# Patient Record
Sex: FEMALE | Race: WHITE | Employment: OTHER | URBAN - METROPOLITAN AREA
[De-identification: names, ages, dates, MRNs, and addresses within clinical notes are randomized per-mention and may not be internally consistent; named-entity substitution may affect disease eponyms.]

---

## 2017-05-24 ENCOUNTER — TELEPHONE (OUTPATIENT)
Dept: ORTHOPEDIC SURGERY | Age: 82
End: 2017-05-24

## 2017-05-24 PROBLEM — F03.90 DEMENTIA (HCC): Status: ACTIVE | Noted: 2017-05-24

## 2017-05-24 PROBLEM — K21.9 GERD (GASTROESOPHAGEAL REFLUX DISEASE): Status: ACTIVE | Noted: 2017-05-24

## 2017-05-25 PROBLEM — I48.20 CHRONIC ATRIAL FIBRILLATION (HCC): Status: ACTIVE | Noted: 2017-05-25

## 2017-05-25 PROBLEM — Z01.818 PRE-OP EXAM: Status: ACTIVE | Noted: 2017-05-25

## 2017-05-25 PROBLEM — I25.5 CARDIOMYOPATHY, ISCHEMIC: Status: ACTIVE | Noted: 2017-05-25

## 2017-06-06 ENCOUNTER — TELEPHONE (OUTPATIENT)
Dept: ORTHOPEDIC SURGERY | Age: 82
End: 2017-06-06

## 2017-06-29 ENCOUNTER — OFFICE VISIT (OUTPATIENT)
Dept: ORTHOPEDIC SURGERY | Age: 82
End: 2017-06-29

## 2017-06-29 VITALS — BODY MASS INDEX: 21.9 KG/M2 | WEIGHT: 119 LBS | HEIGHT: 62 IN

## 2017-06-29 DIAGNOSIS — M25.551 HIP PAIN, RIGHT: ICD-10-CM

## 2017-06-29 DIAGNOSIS — S72.001D CLOSED FRACTURE OF NECK OF RIGHT FEMUR WITH ROUTINE HEALING, SUBSEQUENT ENCOUNTER: Primary | ICD-10-CM

## 2017-06-29 PROCEDURE — 73502 X-RAY EXAM HIP UNI 2-3 VIEWS: CPT | Performed by: ORTHOPAEDIC SURGERY

## 2017-06-29 PROCEDURE — 99024 POSTOP FOLLOW-UP VISIT: CPT | Performed by: ORTHOPAEDIC SURGERY

## 2017-06-29 RX ORDER — HYDROCODONE BITARTRATE AND ACETAMINOPHEN 10; 300 MG/1; MG/1
TABLET ORAL
Status: ON HOLD | COMMUNITY
Start: 2017-06-19 | End: 2020-10-08

## 2017-06-29 RX ORDER — DONEPEZIL HYDROCHLORIDE 10 MG/1
TABLET, FILM COATED ORAL
Status: ON HOLD | COMMUNITY
Start: 2017-06-27 | End: 2021-12-12

## 2017-06-29 RX ORDER — TEMAZEPAM 15 MG/1
CAPSULE ORAL
Status: ON HOLD | COMMUNITY
Start: 2017-05-17 | End: 2020-10-08

## 2017-06-29 RX ORDER — WARFARIN SODIUM 2.5 MG/1
2.5 TABLET ORAL DAILY
COMMUNITY
Start: 2017-04-25

## 2017-06-29 RX ORDER — LEVOFLOXACIN 250 MG/1
TABLET ORAL
Status: ON HOLD | COMMUNITY
Start: 2017-04-14 | End: 2020-10-08

## 2017-06-29 RX ORDER — DOXYCYCLINE HYCLATE 100 MG
TABLET ORAL
Status: ON HOLD | COMMUNITY
Start: 2017-03-30 | End: 2020-10-08

## 2017-06-29 RX ORDER — SERTRALINE HYDROCHLORIDE 25 MG/1
TABLET, FILM COATED ORAL
Status: ON HOLD | COMMUNITY
Start: 2017-05-17 | End: 2020-10-08

## 2017-06-29 RX ORDER — PERPHENAZINE/AMITRIPTYLINE HCL 2 MG-25 MG
1 TABLET ORAL NIGHTLY
Status: ON HOLD | COMMUNITY
Start: 2017-04-13 | End: 2021-12-12

## 2017-06-29 RX ORDER — LISINOPRIL 10 MG/1
10 TABLET ORAL DAILY
COMMUNITY
Start: 2017-06-26

## 2017-06-29 RX ORDER — SUVOREXANT 10 MG/1
TABLET, FILM COATED ORAL
Status: ON HOLD | COMMUNITY
Start: 2017-06-21 | End: 2020-10-08

## 2017-06-29 RX ORDER — ATORVASTATIN CALCIUM 20 MG/1
20 TABLET, FILM COATED ORAL DAILY
Status: ON HOLD | COMMUNITY
Start: 2017-05-18 | End: 2021-12-12

## 2017-08-29 ENCOUNTER — OFFICE VISIT (OUTPATIENT)
Dept: ORTHOPEDIC SURGERY | Age: 82
End: 2017-08-29

## 2017-08-29 VITALS
BODY MASS INDEX: 20.24 KG/M2 | HEART RATE: 65 BPM | SYSTOLIC BLOOD PRESSURE: 138 MMHG | WEIGHT: 110 LBS | HEIGHT: 62 IN | DIASTOLIC BLOOD PRESSURE: 86 MMHG

## 2017-08-29 DIAGNOSIS — S72.001D CLOSED FRACTURE OF NECK OF RIGHT FEMUR WITH ROUTINE HEALING, SUBSEQUENT ENCOUNTER: Primary | ICD-10-CM

## 2017-08-29 DIAGNOSIS — S72.001A HIP FRACTURE, RIGHT, CLOSED, INITIAL ENCOUNTER (HCC): ICD-10-CM

## 2017-08-29 PROCEDURE — 99213 OFFICE O/P EST LOW 20 MIN: CPT | Performed by: ORTHOPAEDIC SURGERY

## 2017-08-29 PROCEDURE — 73502 X-RAY EXAM HIP UNI 2-3 VIEWS: CPT | Performed by: ORTHOPAEDIC SURGERY

## 2017-09-09 ENCOUNTER — HOSPITAL ENCOUNTER (OUTPATIENT)
Dept: VASCULAR LAB | Age: 82
Discharge: HOME | End: 2017-09-09
Attending: NURSE PRACTITIONER | Admitting: NURSE PRACTITIONER

## 2017-09-09 DIAGNOSIS — M79.605 PAIN OF LEFT LEG: ICD-10-CM

## 2018-09-26 PROBLEM — Z01.818 PRE-OP EXAM: Status: RESOLVED | Noted: 2017-05-25 | Resolved: 2018-09-26

## 2020-10-07 ENCOUNTER — APPOINTMENT (OUTPATIENT)
Dept: GENERAL RADIOLOGY | Age: 85
End: 2020-10-07
Payer: MEDICARE

## 2020-10-07 ENCOUNTER — HOSPITAL ENCOUNTER (OUTPATIENT)
Age: 85
Setting detail: OBSERVATION
Discharge: NURSING, REHAB OR LONG-TERM CARE | End: 2020-10-09
Attending: EMERGENCY MEDICINE | Admitting: STUDENT IN AN ORGANIZED HEALTH CARE EDUCATION/TRAINING PROGRAM
Payer: MEDICARE

## 2020-10-07 LAB
A/G RATIO: 1.5 (ref 1.1–2.2)
ALBUMIN SERPL-MCNC: 4.1 G/DL (ref 3.4–5)
ALP BLD-CCNC: 83 U/L (ref 40–129)
ALT SERPL-CCNC: 16 U/L (ref 10–40)
ANION GAP SERPL CALCULATED.3IONS-SCNC: 12 MMOL/L (ref 3–16)
AST SERPL-CCNC: 28 U/L (ref 15–37)
BASOPHILS ABSOLUTE: 0 K/UL (ref 0–0.2)
BASOPHILS RELATIVE PERCENT: 0.6 %
BILIRUB SERPL-MCNC: 0.6 MG/DL (ref 0–1)
BILIRUBIN URINE: NEGATIVE
BLOOD, URINE: NEGATIVE
BUN BLDV-MCNC: 19 MG/DL (ref 7–20)
CALCIUM SERPL-MCNC: 10.3 MG/DL (ref 8.3–10.6)
CHLORIDE BLD-SCNC: 100 MMOL/L (ref 99–110)
CLARITY: ABNORMAL
CO2: 25 MMOL/L (ref 21–32)
COLOR: YELLOW
CREAT SERPL-MCNC: 0.8 MG/DL (ref 0.6–1.2)
EOSINOPHILS ABSOLUTE: 0.1 K/UL (ref 0–0.6)
EOSINOPHILS RELATIVE PERCENT: 0.7 %
EPITHELIAL CELLS, UA: 1 /HPF (ref 0–5)
GFR AFRICAN AMERICAN: >60
GFR NON-AFRICAN AMERICAN: >60
GLOBULIN: 2.7 G/DL
GLUCOSE BLD-MCNC: 98 MG/DL (ref 70–99)
GLUCOSE URINE: NEGATIVE MG/DL
HCG QUALITATIVE: NEGATIVE
HCT VFR BLD CALC: 47.2 % (ref 36–48)
HEMOGLOBIN: 15.8 G/DL (ref 12–16)
HYALINE CASTS: 0 /LPF (ref 0–8)
KETONES, URINE: NEGATIVE MG/DL
LACTIC ACID: 1.6 MMOL/L (ref 0.4–2)
LEUKOCYTE ESTERASE, URINE: ABNORMAL
LYMPHOCYTES ABSOLUTE: 1.6 K/UL (ref 1–5.1)
LYMPHOCYTES RELATIVE PERCENT: 21.7 %
MCH RBC QN AUTO: 33.9 PG (ref 26–34)
MCHC RBC AUTO-ENTMCNC: 33.5 G/DL (ref 31–36)
MCV RBC AUTO: 101.1 FL (ref 80–100)
MICROSCOPIC EXAMINATION: YES
MONOCYTES ABSOLUTE: 0.5 K/UL (ref 0–1.3)
MONOCYTES RELATIVE PERCENT: 6.6 %
NEUTROPHILS ABSOLUTE: 5.3 K/UL (ref 1.7–7.7)
NEUTROPHILS RELATIVE PERCENT: 70.4 %
NITRITE, URINE: NEGATIVE
PDW BLD-RTO: 13.8 % (ref 12.4–15.4)
PH UA: 7.5 (ref 5–8)
PLATELET # BLD: 173 K/UL (ref 135–450)
PMV BLD AUTO: 8.3 FL (ref 5–10.5)
POTASSIUM REFLEX MAGNESIUM: 4.6 MMOL/L (ref 3.5–5.1)
PROTEIN UA: NEGATIVE MG/DL
RBC # BLD: 4.67 M/UL (ref 4–5.2)
RBC UA: 1 /HPF (ref 0–4)
SODIUM BLD-SCNC: 137 MMOL/L (ref 136–145)
SPECIFIC GRAVITY UA: 1.01 (ref 1–1.03)
TOTAL PROTEIN: 6.8 G/DL (ref 6.4–8.2)
TROPONIN: <0.01 NG/ML
URINE REFLEX TO CULTURE: ABNORMAL
URINE TYPE: ABNORMAL
UROBILINOGEN, URINE: 0.2 E.U./DL
WBC # BLD: 7.6 K/UL (ref 4–11)
WBC UA: 8 /HPF (ref 0–5)

## 2020-10-07 PROCEDURE — 2580000003 HC RX 258: Performed by: EMERGENCY MEDICINE

## 2020-10-07 PROCEDURE — 73560 X-RAY EXAM OF KNEE 1 OR 2: CPT

## 2020-10-07 PROCEDURE — 84703 CHORIONIC GONADOTROPIN ASSAY: CPT

## 2020-10-07 PROCEDURE — 83605 ASSAY OF LACTIC ACID: CPT

## 2020-10-07 PROCEDURE — 93005 ELECTROCARDIOGRAM TRACING: CPT | Performed by: EMERGENCY MEDICINE

## 2020-10-07 PROCEDURE — 80053 COMPREHEN METABOLIC PANEL: CPT

## 2020-10-07 PROCEDURE — 90715 TDAP VACCINE 7 YRS/> IM: CPT | Performed by: EMERGENCY MEDICINE

## 2020-10-07 PROCEDURE — 6370000000 HC RX 637 (ALT 250 FOR IP): Performed by: EMERGENCY MEDICINE

## 2020-10-07 PROCEDURE — 99285 EMERGENCY DEPT VISIT HI MDM: CPT

## 2020-10-07 PROCEDURE — 81001 URINALYSIS AUTO W/SCOPE: CPT

## 2020-10-07 PROCEDURE — 84484 ASSAY OF TROPONIN QUANT: CPT

## 2020-10-07 PROCEDURE — 85025 COMPLETE CBC W/AUTO DIFF WBC: CPT

## 2020-10-07 PROCEDURE — 71045 X-RAY EXAM CHEST 1 VIEW: CPT

## 2020-10-07 PROCEDURE — 90471 IMMUNIZATION ADMIN: CPT | Performed by: EMERGENCY MEDICINE

## 2020-10-07 PROCEDURE — 6360000002 HC RX W HCPCS: Performed by: EMERGENCY MEDICINE

## 2020-10-07 PROCEDURE — 36415 COLL VENOUS BLD VENIPUNCTURE: CPT

## 2020-10-07 RX ORDER — BACITRACIN, NEOMYCIN, POLYMYXIN B 400; 3.5; 5 [USP'U]/G; MG/G; [USP'U]/G
OINTMENT TOPICAL ONCE
Status: COMPLETED | OUTPATIENT
Start: 2020-10-07 | End: 2020-10-07

## 2020-10-07 RX ORDER — 0.9 % SODIUM CHLORIDE 0.9 %
1000 INTRAVENOUS SOLUTION INTRAVENOUS ONCE
Status: COMPLETED | OUTPATIENT
Start: 2020-10-07 | End: 2020-10-08

## 2020-10-07 RX ADMIN — SODIUM CHLORIDE 1000 ML: 9 INJECTION, SOLUTION INTRAVENOUS at 20:43

## 2020-10-07 RX ADMIN — TETANUS TOXOID, REDUCED DIPHTHERIA TOXOID AND ACELLULAR PERTUSSIS VACCINE, ADSORBED 0.5 ML: 5; 2.5; 8; 8; 2.5 SUSPENSION INTRAMUSCULAR at 20:43

## 2020-10-07 RX ADMIN — BACITRACIN ZINC, NEOMYCIN SULFATE, AND POLYMYXIN B SULFATE: 400; 3.5; 5 OINTMENT TOPICAL at 20:27

## 2020-10-08 PROBLEM — R53.1 GENERALIZED WEAKNESS: Status: ACTIVE | Noted: 2020-10-08

## 2020-10-08 LAB
EKG ATRIAL RATE: 68 BPM
EKG DIAGNOSIS: NORMAL
EKG Q-T INTERVAL: 460 MS
EKG QRS DURATION: 142 MS
EKG QTC CALCULATION (BAZETT): 496 MS
EKG R AXIS: 253 DEGREES
EKG T AXIS: 84 DEGREES
EKG VENTRICULAR RATE: 70 BPM
INR BLD: 2.54 (ref 0.86–1.14)
PROTHROMBIN TIME: 29.7 SEC (ref 10–13.2)

## 2020-10-08 PROCEDURE — G0378 HOSPITAL OBSERVATION PER HR: HCPCS

## 2020-10-08 PROCEDURE — 6370000000 HC RX 637 (ALT 250 FOR IP): Performed by: NURSE PRACTITIONER

## 2020-10-08 PROCEDURE — 97530 THERAPEUTIC ACTIVITIES: CPT

## 2020-10-08 PROCEDURE — 97162 PT EVAL MOD COMPLEX 30 MIN: CPT

## 2020-10-08 PROCEDURE — 94760 N-INVAS EAR/PLS OXIMETRY 1: CPT

## 2020-10-08 PROCEDURE — 6370000000 HC RX 637 (ALT 250 FOR IP): Performed by: FAMILY MEDICINE

## 2020-10-08 PROCEDURE — 2580000003 HC RX 258: Performed by: NURSE PRACTITIONER

## 2020-10-08 PROCEDURE — 85610 PROTHROMBIN TIME: CPT

## 2020-10-08 PROCEDURE — 97535 SELF CARE MNGMENT TRAINING: CPT

## 2020-10-08 PROCEDURE — 93010 ELECTROCARDIOGRAM REPORT: CPT | Performed by: INTERNAL MEDICINE

## 2020-10-08 PROCEDURE — 97116 GAIT TRAINING THERAPY: CPT

## 2020-10-08 PROCEDURE — 36415 COLL VENOUS BLD VENIPUNCTURE: CPT

## 2020-10-08 PROCEDURE — 97166 OT EVAL MOD COMPLEX 45 MIN: CPT

## 2020-10-08 RX ORDER — DONEPEZIL HYDROCHLORIDE 10 MG/1
10 TABLET, ORALLY DISINTEGRATING ORAL NIGHTLY
Status: DISCONTINUED | OUTPATIENT
Start: 2020-10-08 | End: 2020-10-08

## 2020-10-08 RX ORDER — METOPROLOL SUCCINATE 25 MG/1
25 TABLET, EXTENDED RELEASE ORAL DAILY
Status: DISCONTINUED | OUTPATIENT
Start: 2020-10-08 | End: 2020-10-09 | Stop reason: HOSPADM

## 2020-10-08 RX ORDER — FUROSEMIDE 20 MG/1
20 TABLET ORAL DAILY PRN
Status: DISCONTINUED | OUTPATIENT
Start: 2020-10-08 | End: 2020-10-09 | Stop reason: HOSPADM

## 2020-10-08 RX ORDER — HYDROCODONE BITARTRATE AND ACETAMINOPHEN 10; 325 MG/1; MG/1
1 TABLET ORAL EVERY 6 HOURS PRN
Status: ON HOLD | COMMUNITY
End: 2020-10-09 | Stop reason: SDUPTHER

## 2020-10-08 RX ORDER — SODIUM CHLORIDE 0.9 % (FLUSH) 0.9 %
10 SYRINGE (ML) INJECTION PRN
Status: DISCONTINUED | OUTPATIENT
Start: 2020-10-08 | End: 2020-10-09 | Stop reason: HOSPADM

## 2020-10-08 RX ORDER — GEMFIBROZIL 600 MG/1
600 TABLET, FILM COATED ORAL 2 TIMES DAILY
Status: DISCONTINUED | OUTPATIENT
Start: 2020-10-08 | End: 2020-10-08

## 2020-10-08 RX ORDER — DONEPEZIL HYDROCHLORIDE 10 MG/1
10 TABLET, FILM COATED ORAL NIGHTLY
Status: DISCONTINUED | OUTPATIENT
Start: 2020-10-08 | End: 2020-10-09 | Stop reason: HOSPADM

## 2020-10-08 RX ORDER — LISINOPRIL 5 MG/1
2.5 TABLET ORAL DAILY
Status: DISCONTINUED | OUTPATIENT
Start: 2020-10-08 | End: 2020-10-09 | Stop reason: HOSPADM

## 2020-10-08 RX ORDER — ACETAMINOPHEN 650 MG/1
650 SUPPOSITORY RECTAL EVERY 6 HOURS PRN
Status: DISCONTINUED | OUTPATIENT
Start: 2020-10-08 | End: 2020-10-09 | Stop reason: HOSPADM

## 2020-10-08 RX ORDER — ONDANSETRON 2 MG/ML
4 INJECTION INTRAMUSCULAR; INTRAVENOUS EVERY 6 HOURS PRN
Status: DISCONTINUED | OUTPATIENT
Start: 2020-10-08 | End: 2020-10-09 | Stop reason: HOSPADM

## 2020-10-08 RX ORDER — WARFARIN SODIUM 2 MG/1
2 TABLET ORAL
Status: DISCONTINUED | OUTPATIENT
Start: 2020-10-09 | End: 2020-10-08

## 2020-10-08 RX ORDER — POLYETHYLENE GLYCOL 3350 17 G/17G
17 POWDER, FOR SOLUTION ORAL DAILY PRN
Status: DISCONTINUED | OUTPATIENT
Start: 2020-10-08 | End: 2020-10-09 | Stop reason: HOSPADM

## 2020-10-08 RX ORDER — PROMETHAZINE HYDROCHLORIDE 25 MG/1
12.5 TABLET ORAL EVERY 6 HOURS PRN
Status: DISCONTINUED | OUTPATIENT
Start: 2020-10-08 | End: 2020-10-09 | Stop reason: HOSPADM

## 2020-10-08 RX ORDER — WARFARIN SODIUM 3 MG/1
3 TABLET ORAL
Status: DISCONTINUED | OUTPATIENT
Start: 2020-10-08 | End: 2020-10-08

## 2020-10-08 RX ORDER — SODIUM CHLORIDE 0.9 % (FLUSH) 0.9 %
10 SYRINGE (ML) INJECTION EVERY 12 HOURS SCHEDULED
Status: DISCONTINUED | OUTPATIENT
Start: 2020-10-08 | End: 2020-10-09 | Stop reason: HOSPADM

## 2020-10-08 RX ORDER — AMIODARONE HYDROCHLORIDE 200 MG/1
200 TABLET ORAL DAILY
Status: DISCONTINUED | OUTPATIENT
Start: 2020-10-08 | End: 2020-10-08

## 2020-10-08 RX ORDER — ACETAMINOPHEN 325 MG/1
650 TABLET ORAL EVERY 6 HOURS PRN
Status: DISCONTINUED | OUTPATIENT
Start: 2020-10-08 | End: 2020-10-09 | Stop reason: HOSPADM

## 2020-10-08 RX ORDER — LATANOPROST 50 UG/ML
1 SOLUTION/ DROPS OPHTHALMIC NIGHTLY
Status: DISCONTINUED | OUTPATIENT
Start: 2020-10-08 | End: 2020-10-09 | Stop reason: HOSPADM

## 2020-10-08 RX ORDER — FUROSEMIDE 20 MG/1
20 TABLET ORAL DAILY
Status: DISCONTINUED | OUTPATIENT
Start: 2020-10-08 | End: 2020-10-08

## 2020-10-08 RX ORDER — ATORVASTATIN CALCIUM 20 MG/1
20 TABLET, FILM COATED ORAL EVERY EVENING
Status: DISCONTINUED | OUTPATIENT
Start: 2020-10-08 | End: 2020-10-09 | Stop reason: HOSPADM

## 2020-10-08 RX ORDER — WARFARIN SODIUM 3 MG/1
3 TABLET ORAL
Status: COMPLETED | OUTPATIENT
Start: 2020-10-08 | End: 2020-10-08

## 2020-10-08 RX ORDER — PANTOPRAZOLE SODIUM 40 MG/1
40 TABLET, DELAYED RELEASE ORAL
Status: DISCONTINUED | OUTPATIENT
Start: 2020-10-08 | End: 2020-10-08

## 2020-10-08 RX ADMIN — ATORVASTATIN CALCIUM 20 MG: 20 TABLET, FILM COATED ORAL at 17:41

## 2020-10-08 RX ADMIN — LATANOPROST 1 DROP: 50 SOLUTION OPHTHALMIC at 20:23

## 2020-10-08 RX ADMIN — DONEPEZIL HYDROCHLORIDE 10 MG: 10 TABLET, FILM COATED ORAL at 20:23

## 2020-10-08 RX ADMIN — WARFARIN SODIUM 3 MG: 3 TABLET ORAL at 17:41

## 2020-10-08 RX ADMIN — Medication 10 ML: at 20:24

## 2020-10-08 RX ADMIN — Medication 10 ML: at 11:10

## 2020-10-08 RX ADMIN — METOPROLOL SUCCINATE 25 MG: 25 TABLET, EXTENDED RELEASE ORAL at 11:08

## 2020-10-08 RX ADMIN — LISINOPRIL 2.5 MG: 5 TABLET ORAL at 11:08

## 2020-10-08 ASSESSMENT — PAIN SCALES - GENERAL
PAINLEVEL_OUTOF10: 0
PAINLEVEL_OUTOF10: 0

## 2020-10-08 NOTE — ED NOTES
Report called to Farshad Castelan RN on 3W. All questions answered.      Paradise Mathews RN  10/08/20 9886

## 2020-10-08 NOTE — DISCHARGE INSTR - COC
Continuity of Care Form    Patient Name: Lyle Khoury   :  3/2/1928  MRN:  [id number]    Admit date:  10/7/2020  Discharge date:  10/9/20    Code Status Order: Full Code   Advance Directives:   [facility] Documentation       Date/Time Healthcare Directive Type of Healthcare Directive Copy in [address] Agent's Name Healthcare Agent's Phone Number    10/08/20 1057  Yes, patient has an advance directive for healthcare treatment  Durable power of  for health care;Living will  No, copy requested from family  Adult [address] [facility],[address]  --            Admitting Physician:  Nina Hernandez DO  PCP: Aníbal Tang MD    Discharging Nurse: [facility] Unit/Room#: O3Q-3587/1256-63  Discharging Unit Phone Number: [phone]    Emergency Contact:   Extended Emergency Contact Information  Primary Emergency Contact: Clyde Martel  Home Phone: [phone]  Relation: Child  Secondary Emergency Contact: Deysi Michael  Home Phone: [phone]  Relation: Child    Past Surgical History:  Past Surgical History:   Procedure Laterality Date    APPENDECTOMY      HIP ARTHROPLASTY Right 2017    RIGHT BIPOLAR HIP HEMIARTHROPLASTY        PACEMAKER INSERTION         Immunization History:   Immunization History   Administered Date(s) Administered    Influenza Vaccine, unspecified formulation 10/01/2016    Pneumococcal Conjugate 13-valent (Marge Royal) 10/22/2016    Tdap (Boostrix, Adacel) 10/07/2020       Active Problems:  Patient Active Problem List   Diagnosis Code    ARF (acute renal failure) ([facility] [address] N17.9    HTN (hypertension) I10    Hyperkalemia E87.5    Knee pain M25.569    Atrial fibrillation with RVR ([facility]) I48.91    Acute on chronic systolic congestive heart failure ([facility] [address] I50.23    17 RIGHT hip hemiarthroplasty S72.001A    Dementia ([facility]) F03.90    GERD (gastroesophageal reflux disease) K21.9    Chronic atrial fibrillation ([facility]) I48.20 Cardiomyopathy, ischemic I25.5    Closed fracture of neck of right femur (HCC) S72.001A    Chronic anticoagulation Z79.01    Generalized weakness R53.1       Isolation/Infection:   Isolation            No Isolation          Patient Infection Status       None to display            Nurse Assessment:  Last Vital Signs: BP (!) 164/98   Pulse 69   Temp 97.6 °F (36.4 °C) (Oral)   Resp 16   Ht 5' 2\" (1.575 m)   Wt 116 lb 6.5 oz (52.8 kg)   SpO2 94%   BMI 21.29 kg/m²     Last documented pain score (0-10 scale): Pain Level: 0  Last Weight:   Wt Readings from Last 1 Encounters:   10/08/20 116 lb 6.5 oz (52.8 kg)     Mental Status:  oriented    IV Access:  - None    Nursing Mobility/ADLs:  Walking   Assisted  Transfer  Assisted  Bathing  Assisted  Dressing  Assisted  Toileting  Assisted  Feeding  Assisted  Med Admin  Independent  Med Delivery   whole    Wound Care Documentation and Therapy:  Incision 05/25/17 Hip Right (Active)   Number of days: 1231        Elimination:  Continence: Bowel: Yes  Bladder: Yes  Urinary Catheter: None   Colostomy/Ileostomy/Ileal Conduit: No       Date of Last BM:     Intake/Output Summary (Last 24 hours) at 10/8/2020 1155  Last data filed at 10/8/2020 0140  Gross per 24 hour   Intake 360 ml   Output --   Net 360 ml     I/O last 3 completed shifts: In: 360 [P.O.:360]  Out: -     Safety Concerns:     None    Impairments/Disabilities:      None    Nutrition Therapy:  Current Nutrition Therapy:   - Oral Diet:  General    Routes of Feeding: Oral  Liquids: Thin Liquids  Daily Fluid Restriction: no  Last Modified Barium Swallow with Video (Video Swallowing Test): not done    Treatments at the Time of Hospital Discharge:   Respiratory Treatments:   Oxygen Therapy:  is not on home oxygen therapy.   Ventilator:    - No ventilator support    Rehab Therapies: Physical Therapy and Occupational Therapy  Weight Bearing Status/Restrictions: No weight bearing restirctions  Other Medical Equipment (for information only, NOT a DME order):  walker  Other Treatments:     Heart Failure Instructions for Daily Management  Patient was treated for chronic systolic heart failure. she  will require the following:    Please weigh daily on the same scale and approximately the same time of day. Report weight gain of 3 pounds/day or 5 pounds/week to : [facility] [phone], [facility] MD and Zunilda Gay -[phone]. Please use hospital discharge weight as baseline reference. Please monitor for signs and symptoms of and report to MD:  Worsening Heart Failure: sudden weight gain, shortness of breath, lower extremity or general edema/swelling, abdominal bloating/swelling, inability to lie flat, intolerance to usual activity, or cough (especially at night). Report these finding even if no increase in weight. Dehydration:  having difficulty or a decrease in urination, dizziness, worsening fatigue, or new onset/worsening of generalized weakness. Please continue a LOW SODIUM diet and LIMIT fluid intake to 48 - 64 ounces ( 1.5 - 2 liters) per day. Call [facility] [phone], Zunilda Gay [phone] and [facility] MD and/or Jovany Stevens @ [phone] with any questions or concerns. Please continue heart failure education to patient and family/support system. See After Visit Summary for hospital follow up appointment details. Consider spiritual care referral for support and/or completion of advance directives [phone]. Consider: having the facility MD complete required 7 day follow up, [place] [address] telehealth program if patient agreeable and able to participate, palliative care consult for ongoing goals of care, end of life, and/or chronic disease management discussions and referral to [facility] ([phone]) once SNF/HHC complete.       Patient's personal belongings (please select all that are sent with patient):  None    RN SIGNATURE: Electronically signed by Molly Cam RN on 10/9/20 at 1:17 PM EDT    CASE MANAGEMENT/SOCIAL WORK SECTION    Inpatient Status Date: OBS    Readmission Risk Assessment Score:  Readmission Risk              Risk of Unplanned Readmission:        0           Discharging to Facility/ Agency   Name: [facility]  Address:  Phone: [phone]  Fax:    Dialysis Facility (if applicable)   Name:  Address:  Dialysis Schedule:  Phone:  Fax:    / signature:Electronically signed by Jerardo Daniels on 10/9/2020 at 1:28 PM     PHYSICIAN SECTION    Prognosis: Good    Condition at Discharge: Stable    Rehab Potential (if transferring to Rehab): Good    Recommended Labs or Other Treatments After Discharge: PT, evaluate and treat    Physician Certification: I certify the above information and transfer of Janet Frost  is necessary for the continuing treatment of the diagnosis listed and that she requires [place] for less 30 days.      Update Admission H&P: No change in H&P    PHYSICIAN SIGNATURE:  Electronically signed by Carlos Adams MD on 10/9/20 at 3:12 PM EDT

## 2020-10-08 NOTE — PLAN OF CARE
Problem: Skin Integrity:  Goal: Will show no infection signs and symptoms  Description: Will show no infection signs and symptoms  Outcome: Ongoing  Note: Will show no signs/symptoms infection. Electronically signed by Armand Frazier RN on 10/8/2020 at 7:34 AM    Goal: Absence of new skin breakdown  Description: Absence of new skin breakdown  Outcome: Ongoing  Note: Absence of new skin breakdown. Electronically signed by Armand Frazier RN on 10/8/2020 at 7:34 AM       Problem: Falls - Risk of:  Goal: Will remain free from falls  Description: Will remain free from falls  Outcome: Ongoing  Note: Will remain free from falls. Electronically signed by Armand Frazier RN on 10/8/2020 at 7:34 AM    Goal: Absence of physical injury  Description: Absence of physical injury  Outcome: Ongoing  Note: Absence of physical injury.   Electronically signed by Armand Frazier RN on 10/8/2020 at 7:34 AM

## 2020-10-08 NOTE — CARE COORDINATION
Bed available and patient accepted for admit to [facility]. They have initiated precert  and will call with co-pay information tomorrow. Will need PAS completed.     Electronically signed by Gary Sandhu on 10/8/2020 at 5:21 PM

## 2020-10-08 NOTE — PROGRESS NOTES
Pharmacy Medication Reconciliation Note     List of medications patient is currently taking is complete. Source of information:   1. Conversation with pt's daughter via telephone  2. [address] RN   3. Called [facility], verified fills     Allergies   Allergen Reactions    Pcn [Penicillins] Hives       Notes regarding home medications:   1. Pt has not had Amiodarone filled all of 2020. Confirmed with daughter that she is not taking- she states it was stopped due to side effects. Megan Rodarte at [facility] states it is still on their med list, they thought she was taking. I gave this info to Megan Rodarte so it can be addressed by Dr Delvalle. Left is on list for now   2. Cleaned up rest of list based on what daughter told me she's giving pt.    3. Sees Pain Management for Norco. Confirmed fills     30 minutes spent on resolving issues     VINEET [facility]  10/8/2020  10:34 AM

## 2020-10-08 NOTE — PROGRESS NOTES
Occupational Therapy   Occupational Therapy Initial Assessment  Date: 10/8/2020   Patient Name: Reji Funk  MRN: [id number]     : 3/2/1928    Date of Service: 10/8/2020    Discharge Recommendations:  Patient would benefit from continued therapy after discharge, 3-5 sessions per week  OT Equipment Recommendations  Other: defer to [address] scored a 13/24 on the AM-PAC ADL Inpatient form. Current research shows that an AM-PAC score of 17 or less is typically not associated with a discharge to the patient's home setting. Based on the patient's AM-PAC score and their current ADL deficits, it is recommended that the patient have 3-5 sessions per week of Occupational Therapy at d/c to increase the patient's independence. Please see assessment section for further patient specific details. If patient discharges prior to next session this note will serve as a discharge summary. Please see below for the latest assessment towards goals. Assessment   Performance deficits / Impairments: Decreased functional mobility ; Decreased safe awareness;Decreased balance;Decreased ADL status; Decreased endurance;Decreased strength;Decreased high-level IADLs  Assessment: 81 y/o female admitted 10/7 with generalized weakness and fall at home resulting in skin tear to L knee. PTA pt lived at home alone with HHA for 1 hour each morning. Pt reports she is able to dress and toilet self when aide is not present and uses RW for mobility but has been having increased difficulty the past week PTA. Today, pt required mod A to stand to RW from bed. Pt with slight posterior lean when standing but able to correct with time and cuing. Pt able to take a few lateral steps towards [facility] however easily fatigued after 3 steps and immediately sat back on bed. Pt required pierre stedy to transfer to bathroom to void. Pt required max A to stand from standard toilet and assist for clothes management and carina care.  Pt is functioning below baseline and will benefit from skilled therapy. Pt acknowledges she is weak but hesitant to go to [facility] because of cost. SW aware. Prognosis: Good  Decision Making: Medium Complexity  OT Education: OT Role;Plan of Care;Transfer Training;ADL Adaptive Strategies  REQUIRES OT FOLLOW UP: Yes  Activity Tolerance  Activity Tolerance: Patient limited by pain  Safety Devices  Safety Devices in place: Yes  Type of devices: Nurse notified;Gait belt;Call light within reach; Left in bed;Bed alarm in place           Patient Diagnosis(es): The primary encounter diagnosis was Generalized weakness. Diagnoses of Skin tear of lower leg without complication, left, initial encounter, Falls infrequently, and Impaired mobility and ADLs were also pertinent to this visit. has a past medical history of Anemia, CAD (coronary artery disease), Glaucoma, High blood pressure, and Myocardial infarction ([facility] [address] has a past surgical history that includes Pacemaker insertion; Appendectomy; and Hip Arthroplasty (Right, 05/25/2017). Restrictions  Restrictions/Precautions  Restrictions/Precautions: Fall Risk    Subjective   General  Chart Reviewed: Yes  Patient assessed for rehabilitation services?: Yes  Additional Pertinent Hx: 79 y/o female admitted 10/7 with generalized weakness and fall at home resulting in skin tear to L knee.   Family / Caregiver Present: No  Referring Practitioner: GRAHAM Torres CNP  Subjective  Subjective: Pt seen bedside and agreeable to therapy  General Comment  Comments: Per RN ok for therapy    Social/Functional History  Social/Functional History  Lives With: Alone(caregiver every morning from 7:30-8:30 (5 days a week) to get patient set for the day, give he meds.)  Type of Home: [address]: One level, Laundry in basement  Home Access: Level entry(enters home through garage, and stair lift from basement to main floor.)  Bathroom Shower/Tub: (walk in tub)  Bathroom Toilet: Standard(with riser)  Bathroom Equipment: Grab bars in shower, Built-in shower seat, Toilet raiser  Home Equipment: Rolling walker, 4 wheeled walker, Alert Button(patient states alert button not working)  ADL Assistance: Needs assistance(HHA assist with showering, pt can dress and toilet self on days aide is not there)  Homemaking Responsibilities: No  Ambulation Assistance: Independent(with 2 wh walker. States recently having difficulty transferring and ambulating)  Transfer Assistance: Independent(with 2 wh walker. States recently having difficulty transferring and ambulating)  Active : No  Additional Comments: dtr does does grocery. dtr or aide does laundry, cleaning. Patient states she sleeps on couch. Objective   Vision: Impaired  Vision Exceptions: Wears glasses for reading  Hearing: Within functional limits          Balance  Sitting Balance: Stand by assistance(EOB in prep for transfers)  Standing Balance  Time: stood prn with pierre stedy support  Functional Mobility  Functional Mobility Comments: few lateral side steps with RW and min A- easily fatigued after 3 steps. Use of pierre stedy to transfer to bathroom  Toilet Transfers  Equipment Used: Standard toilet  Toilet Transfer: Maximum assistance  Toilet Transfers Comments: use of pierre stedy to transfer to bathroom     ADL  Toileting: Dependent/Total(max A to stand from standard commmode- assist for depends up/down and carina care)  Additional Comments: Anticipate pt will require max A for LB bathing/dressing, min A for UB bathing/dressing and grooming based on balance and strength observed        Bed mobility  Supine to Sit: Stand by assistance  Sit to Supine: Stand by assistance  Transfers  Sit to stand: Moderate assistance  Stand to sit: Moderate assistance  Transfer Comments: mod  A to stand from bed to RW and pierre stedy.  Slight posterior lean when standing to RW initially     Cognition  Overall Cognitive Status: Exceptions  Arousal/Alertness:

## 2020-10-08 NOTE — PROGRESS NOTES
Patient arrived to room 3102 from [facility]. Patient is A/Ox4, could recall correct month and year but unsure of exact day. Oriented patient to room and call light. Fall assessment completed, patient admitted from home with multiple falls and generalized weakness. Fall precautions in place. Instructed patient to call for help out of bed, patient verbalized understanding. Patient denies any further needs at this time, will continue to monitor and assess for needs and comfort.

## 2020-10-08 NOTE — PROGRESS NOTES
Physical Therapy    Facility/Department: [address] ORTHOPEDICS  Initial Assessment  This note serves as patient discharge summary if pt discharges prior to next PT visit      NAME: Cinthia Segovia  : 3/2/1928  MRN: [id number]    Date of Service: 10/8/2020    Discharge Recommendations:  3-5 sessions per week   Cinthia Segovia scored a 13/24 on the AM-PAC short mobility form. Current research shows that an AM-PAC score of 17 or less is typically not associated with a discharge to the patient's home setting. Based on the patient's AM-PAC score and their current functional mobility deficits, it is recommended that the patient have 3-5 sessions per week of Physical Therapy at d/c to increase the patient's independence. Please see assessment section for further patient specific details. PT Equipment Recommendations  Other: She may benefit from [address] wh walker. Assessment   Body structures, Functions, Activity limitations: Decreased functional mobility ; Decreased strength; Increased pain;Decreased ADL status; Decreased cognition;Decreased balance  Assessment: Per H and P:  \"The patient is a 80 y.o. female who presents to [facility] with pMHX: CAD, pacemaker, history MI, hypertension, glaucoma and anemia. Nikunj Frode at home at 8am 10/07-> skin tear left kneePt has a caretaker in the home. Patient is presenting with recent but progressive generalized weakness. Up until most recently the patient has been fairly independent at home with use of a walker independently. She is most recently been noted by her caretaker to be unable to ambulate independently even with a walker. She has not been able to get out of bed. \"  Workup ongoing. Prior status: lives alone in home with stair lift entry. Has aide assistance M-F 7:30-8:30 for morning set up, bathing, and meds. Family or aide for IADLs. Patient reports she has been independent \"until recently\" with transfers, home 2 wh walker ambulation, and toileting.  Status

## 2020-10-08 NOTE — PROGRESS NOTES
4 Eyes Skin Assessment     NAME:  Augustine Moser  YOB: 1928  MEDICAL RECORD NUMBER:  [id number]    The patient is being assess for  Admission    I agree that 2 RN's have performed a thorough Head to Toe Skin Assessment on the patient. ALL assessment sites listed below have been assessed. Areas assessed by both nurses:    Head, Face, Ears, Shoulders, Back, Chest, Arms, Elbows, Hands, Sacrum. Buttock, Coccyx, Ischium and Legs. Feet and Heels        Does the Patient have a Wound?  Other Skin tear to left knee       Bernardino Prevention initiated:  Yes   Wound Care Orders initiated:  No    Pressure Injury (Stage 3,4, Unstageable, DTI, NWPT, and Complex wounds) if present place consult order under [de-identified] No    New and Established Ostomies if present place consult order under : NA      Nurse 1 eSignature: Electronically signed by Johnna Shepherd RN on 10/8/20 at 3:36 AM EDT    **SHARE this note so that the co-signing nurse is able to place an eSignature**    Nurse 2 eSignature: Electronically signed by Rahul Maya RN on 10/8/20 at 3:39 AM EDT

## 2020-10-08 NOTE — H&P
Hospital Medicine History & Physical      PCP: Asad Jackson MD    Date of Admission: 10/7/2020    Date of Service: Pt seen/examined on 10/08/2020 and Admitted to Inpatient  Placed in Observation. Chief Complaint:  Weakness and unable to ambulated      History Of Present Illness: The patient is a 80 y.o. female who presents to [facility] with [address]: CAD, pacemaker, history MI, hypertension, glaucoma and anemia. Lashanda Dural at home at 8am 10/07-> skin tear left knee  Pt has a caretaker in the home. Patient is presenting with recent but progressive generalized weakness. Up until most recently the patient has been fairly independent at home with use of a walker independently. She is most recently been noted by her caretaker to be unable to ambulate independently even with a walker. She has not been able to get out of bed. She fell injuring her right knee a few days ago. Then today fell tripping over a run. She states that her tetanus shot was greater than 5 years. ED work-up is negative for any evidence of sepsis, bacteremia, STEMI. There is no evidence of dehydration or acute renal failure. Right knee series negative. Chest x-ray is negative for pneumonia there is evidence of remote right rib fractures. No evidence of lactic acidosis. Urinalysis indicated leukoesterase with WBC count 8. No indications of COVID. On my exam the patient is resting comfortably. Hemodynamically stable in a paced rhythm. She answers questions appropriately for me and offers no complaints other than soreness on her knee and remembering she had a skin tear.     CODE STATUS full        Past Medical History:        Diagnosis Date    Anemia     CAD (coronary artery disease)     Glaucoma     High blood pressure     Myocardial infarction [facility])        Past Surgical History:        Procedure Laterality Date    APPENDECTOMY      HIP ARTHROPLASTY Right 05/25/2017    RIGHT BIPOLAR HIP HEMIARTHROPLASTY        PACEMAKER INSERTION         Medications Prior to Admission:    Prior to Admission medications    Medication Sig Start Date End Date Taking? Authorizing Provider   HYDROcodone-acetaminophen  MG TABS .  6/19/17  Yes Historical Provider, MD   atorvastatin (LIPITOR) 20 MG tablet 20 mg daily  5/18/17   Historical Provider, MD   donepezil (ARICEPT) 10 MG tablet  6/27/17   Historical Provider, MD   doxycycline hyclate (VIBRA-TABS) 100 MG tablet  3/30/17   Historical Provider, MD   lisinopril (PRINIVIL;ZESTRIL) 2.5 MG tablet  6/26/17   Historical Provider, MD   levofloxacin (LEVAQUIN) 250 MG tablet  4/14/17   Historical Provider, MD   perphenazine-amitriptyline (ETRAFON;TRIAVIL) 2-25 MG per tablet  4/13/17   Historical Provider, MD   sertraline (ZOLOFT) 25 MG tablet  5/17/17   Historical Provider, MD   BELSOMRA 10 MG TABS  6/21/17   Historical Provider, MD   temazepam (RESTORIL) 15 MG capsule  5/17/17   Historical Provider, MD   warfarin (COUMADIN) 1 MG tablet  4/25/17   Historical Provider, MD   acetaminophen (TYLENOL) 325 MG tablet Take 2 tablets by mouth every 4 hours as needed for Pain or Fever 5/29/17   Zackary Angeles MD   docusate sodium (COLACE, DULCOLAX) 100 MG CAPS Take 100 mg by mouth 2 times daily 5/29/17   Zackary Angeles MD   furosemide (LASIX) 20 MG tablet Take 1 tablet by mouth daily 10/25/16   Konstantin Bowie MD   ferrous sulfate 325 (65 FE) MG tablet Take 325 mg by mouth Daily with supper    Historical Provider, MD   pantoprazole (PROTONIX) 40 MG tablet Take 40 mg by mouth every morning (before breakfast)    Historical Provider, MD   folic acid (FOLVITE) 1 MG tablet Take 1 mg by mouth daily    Historical Provider, MD   celecoxib (CELEBREX) 200 MG capsule Take 200 mg by mouth daily    Historical Provider, MD   amiodarone (CORDARONE) 200 MG tablet Take 200 mg by mouth daily    Historical Provider, MD   donepezil (ARICEPT ODT) 10 MG disintegrating tablet Take 10 mg by mouth nightly    Historical Provider, MD   ascorbic acid (VITAMIN C) 500 MG tablet Take 500 mg by mouth Daily with supper    Historical Provider, MD   latanoprost (XALATAN) 0.005 % ophthalmic solution Place 1 drop into both eyes nightly     Historical Provider, MD   nitroGLYCERIN (NITROSTAT) 0.4 MG SL tablet Place 0.4 mg under the tongue every 5 minutes as needed for Chest pain Dissolve 1 tab under tongue at first sign of chest pain. May repeat every 5 minutes until relief is obtained. If pain persists after taking 3 tabs in a 15-minute period, or the pain is different than is typically experienced, call 9-1-1 immediately. Historical Provider, MD   gemfibrozil (LOPID) 600 MG tablet Take 600 mg by mouth 2 times daily    Historical Provider, MD   metoprolol succinate (TOPROL XL) 25 MG extended release tablet Take 25 mg by mouth daily    Historical Provider, MD   sotalol (BETAPACE) 80 MG tablet Take 80 mg by mouth 2 times daily    Historical Provider, MD   warfarin (COUMADIN) 2.5 MG tablet Take 2.5 mg by mouth Daily. Historical Provider, MD       Allergies:  Pcn [penicillins]    Social History:  The patient currently lives at home with caretaker. TOBACCO:   reports that she has never smoked. She does not have any smokeless tobacco history on file. ETOH:   reports current alcohol use. Family History:  Reviewed in detail and negative for DM, Early CAD, Cancer, CVA. Positive as follows:    History reviewed. No pertinent family history. REVIEW OF SYSTEMS:   Positive for right knee soreness and as noted in the HPI. All other systems reviewed and negative.     PHYSICAL EXAM:    BP (!) 150/98   Pulse 70   Temp 97.4 °F (36.3 °C) (Oral)   Resp 18   Ht 5' 2\" (1.575 m)   Wt 116 lb 6.5 oz (52.8 kg)   SpO2 96%   BMI 21.29 kg/m²     General appearance: No apparent distress appears stated age and cooperative. HEENT Normal cephalic, atraumatic without obvious deformity. Pupils equal, round, and reactive to light. Extra ocular muscles intact. Conjunctivae/corneas clear. Neck: Supple, No jugular venous distention/bruits. Trachea midline without thyromegaly or adenopathy with full range of motion. Lungs: Clear to auscultation, bilaterally without Rales/Wheezes/Rhonchi with good respiratory effort. Heart: Regular rate and rhythm with Normal S1/S2 without murmurs, rubs or gallops, point of maximum impulse non-displaced  Abdomen: Soft, non-tender or non-distended without rigidity or guarding and positive bowel sounds all four quadrants. Extremities: No clubbing, cyanosis, or edema bilaterally. Full range of motion without deformity and normal gait intact. Left patella, skin tear approximately 4 cm x 7 cm noted. Slight amount of oozing of blood. Bilateral upper and bilateral lower extremities negative for evidence of fracture deformity. Range of motion intact. Skin: Skin color, texture, turgor normal.  No rashes or lesions. Neurologic: Alert and oriented X 3, neurovascularly intact with sensory/motor intact upper extremities/lower extremities, bilaterally. Cranial nerves: II-XII intact, grossly non-focal.  Mental status: Alert, oriented, thought content appropriate. Capillary Refill: Acceptable  < 3 seconds  Peripheral Pulses: +3 Easily felt, not easily obliterated with pressure      CXR:  I have reviewed the CXR with the following interpretation: Negative EKG:  I have reviewed the EKG with the following interpretation: Paced rhythm, rate 70.   No evidence of STEMI    CBC   Recent Labs     10/07/20  2017   WBC 7.6   HGB 15.8   HCT 47.2         RENAL  Recent Labs     10/07/20  2017      K 4.6      CO2 25   BUN 19   CREATININE 0.8     LFT'S  Recent Labs     10/07/20  2017   AST 28   ALT 16   BILITOT 0.6   ALKPHOS 83     COAG  No results for input(s): INR in the last 72

## 2020-10-08 NOTE — PROGRESS NOTES
Clinical Pharmacy Note  Warfarin Consult    Sidney Billingsley is a 80 y.o. female receiving warfarin managed by pharmacy. Patient being bridged with none. Warfarin Indication: A fib  Target INR range: 2-3   Dose prior to admission: 3 mg daily     Current warfarin drug-drug interactions:     Recent Labs     10/07/20  2017 10/08/20  0446   HGB 15.8  --    HCT 47.2  --    INR  --  2.54*       Assessment/Plan:    Warfarin 3 mg tonight. Daily PT/INR until stable within therapeutic range. Thank you for the consult. Will continue to follow.   Benjamin Kennedy, [facility]

## 2020-10-08 NOTE — PROGRESS NOTES
Hospitalist Progress Note      PCP: Carlita Chowdhury MD    Date of Admission: 10/7/2020    Chief Complaint: leg weakness    Hospital Course:     Subjective: denies pain but having generalized fatigue with weakness, nothing focal      Medications:  Reviewed    Infusion Medications   Scheduled Medications    atorvastatin  20 mg Oral QPM    latanoprost  1 drop Both Eyes Nightly    lisinopril  2.5 mg Oral Daily    metoprolol succinate  25 mg Oral Daily    sodium chloride flush  10 mL Intravenous 2 times per day    warfarin (COUMADIN) daily dosing (placeholder)   Other RX Placeholder    donepezil  10 mg Oral Nightly     PRN Meds: sodium chloride flush, acetaminophen **OR** acetaminophen, polyethylene glycol, promethazine **OR** ondansetron, furosemide      Intake/Output Summary (Last 24 hours) at 10/8/2020 1928  Last data filed at 10/8/2020 0140  Gross per 24 hour   Intake 360 ml   Output --   Net 360 ml       Exam:    BP (!) 164/98   Pulse 69   Temp 97.6 °F (36.4 °C) (Oral)   Resp 16   Ht 5' 2\" (1.575 m)   Wt 116 lb 6.5 oz (52.8 kg)   SpO2 94%   BMI 21.29 kg/m²     General appearance: No apparent distress, appears stated age and cooperative. HEENT: Pupils equal, round, and reactive to light. Conjunctivae/corneas clear. Neck: Supple, with full range of motion. No jugular venous distention. Trachea midline. Respiratory:  Normal respiratory effort. Clear to auscultation, bilaterally without Rales/Wheezes/Rhonchi. Cardiovascular: Regular rate and rhythm with normal S1/S2 without murmurs, rubs or gallops. Abdomen: Soft, non-tender, non-distended with normal bowel sounds. Musculoskeletal: No clubbing, cyanosis or edema bilaterally. Full range of motion without deformity. Skin: Skin color, texture, turgor normal.  No rashes or lesions. Neurologic:  Neurovascularly intact without any focal sensory/motor deficits.  Cranial nerves: II-XII intact, grossly non-focal.  Psychiatric: Alert and oriented, thought content appropriate, normal insight  Capillary Refill: Brisk,< 3 seconds   Peripheral Pulses: +2 palpable, equal bilaterally       Labs:   Recent Labs     10/07/20  2017   WBC 7.6   HGB 15.8   HCT 47.2        Recent Labs     10/07/20  2017      K 4.6      CO2 25   BUN 19   CREATININE 0.8   CALCIUM 10.3     Recent Labs     10/07/20  2017   AST 28   ALT 16   BILITOT 0.6   ALKPHOS 83     Recent Labs     10/08/20  0446   INR 2.54*     Recent Labs     10/07/20  2017   Isamar Flies <0.01       Urinalysis:      Lab Results   Component Value Date    NITRU Negative 10/07/2020    WBCUA 8 10/07/2020    BACTERIA 4+ 05/29/2017    RBCUA 1 10/07/2020    BLOODU Negative 10/07/2020    SPECGRAV 1.012 10/07/2020    GLUCOSEU Negative 10/07/2020       Radiology:  XR KNEE LEFT (1-2 VIEWS)   Final Result   No acute abnormality of the knee. XR CHEST PORTABLE   Final Result   1. No acute cardiopulmonary process identified. Assessment/Plan:    Active Hospital Problems    Diagnosis Date Noted    Generalized weakness [R53.1] 10/08/2020       Generalized weakness:   No evidence of sepsis, hemodynamically stable  No evidence of anemia or renal failure  No clinical evidence of stroke  CT head  in a.m.   Urine culture in process-> possible early developing UTI  PT, OT  Planning on SNF placement        CAD: Continue home medication regimen  Hypertension: Continue home medication regimen     Glaucoma: Continue home medication regiment    DVT Prophylaxis: Coumadin  Diet: DIET GENERAL; No Added Salt (3-4 GM)  Code Status: Full Code    PT/OT Eval Status: SNF    Dispo - observation, Medically ready for D/C    Lane Laurent MD

## 2020-10-08 NOTE — CARE COORDINATION
INITIAL CASE MANAGEMENT ASSESSMENT    Reviewed chart, met with patient to assess possible discharge needs. Explained Case Management role/services. Living Situation: Lives alone, receives an hour of care M-F, and daughter visits her every other day    ADLs: Pt tells me she occasionally gets assistance with bathing during her daily care visits but otherwise says she can take care of herself, she says she can feed, bath, and dress herself. However her daughter says she hasn't been able to get OOB recently and has been too weak to do anything. DME: 2 wheeled front walker, raised toilet seats, shower chair    PT/OT Recs: SNF     Active Services: hourly visits M-F 8-9am     Transportation: Daughter transports patient PRN     Medications: no trouble purchasing meds, pt uses [facility] in [address]    PCP: Old PCP was promoted, she is unsure of new female PCP    PLAN/COMMENTS: Upon initial assessment and discussing PT/OT recs pt adamantly refusing SNF. With further discussions with pt's daughter Lor Sequeira, she explained pt's  just passed away in nursing home so she really wants to go home. I explained safety concerns and PT/OT recs and that pt is medically stable to dc. Lor Sequeira upset at situation and says she doesn't want her mother to go to SNF but says she can't stay with patient continuously at home and can't lift her to help her either to help her walk. So I again explained this is reason SNF is recommended. Recommended Deysi speak with her mother and after their discussion they are agreeable to referral to [facility] as she has been there before - however her mother does not remember this. Referral made to [facility]. SW/CM provided contact information for patient or family to call with any questions. SW/CM will follow and assist as needed.

## 2020-10-09 VITALS
HEART RATE: 87 BPM | TEMPERATURE: 98 F | OXYGEN SATURATION: 96 % | RESPIRATION RATE: 16 BRPM | BODY MASS INDEX: 21.42 KG/M2 | HEIGHT: 62 IN | SYSTOLIC BLOOD PRESSURE: 115 MMHG | WEIGHT: 116.4 LBS | DIASTOLIC BLOOD PRESSURE: 83 MMHG

## 2020-10-09 LAB
ANION GAP SERPL CALCULATED.3IONS-SCNC: 13 MMOL/L (ref 3–16)
BASOPHILS ABSOLUTE: 0.1 K/UL (ref 0–0.2)
BASOPHILS RELATIVE PERCENT: 1.3 %
BUN BLDV-MCNC: 12 MG/DL (ref 7–20)
CALCIUM SERPL-MCNC: 9.7 MG/DL (ref 8.3–10.6)
CHLORIDE BLD-SCNC: 104 MMOL/L (ref 99–110)
CO2: 21 MMOL/L (ref 21–32)
CREAT SERPL-MCNC: 0.6 MG/DL (ref 0.6–1.2)
EOSINOPHILS ABSOLUTE: 0.1 K/UL (ref 0–0.6)
EOSINOPHILS RELATIVE PERCENT: 0.9 %
GFR AFRICAN AMERICAN: >60
GFR NON-AFRICAN AMERICAN: >60
GLUCOSE BLD-MCNC: 96 MG/DL (ref 70–99)
HCT VFR BLD CALC: 44.9 % (ref 36–48)
HEMOGLOBIN: 15.1 G/DL (ref 12–16)
INR BLD: 2.76 (ref 0.86–1.14)
LYMPHOCYTES ABSOLUTE: 1.7 K/UL (ref 1–5.1)
LYMPHOCYTES RELATIVE PERCENT: 28.8 %
MCH RBC QN AUTO: 34 PG (ref 26–34)
MCHC RBC AUTO-ENTMCNC: 33.7 G/DL (ref 31–36)
MCV RBC AUTO: 100.9 FL (ref 80–100)
MONOCYTES ABSOLUTE: 0.4 K/UL (ref 0–1.3)
MONOCYTES RELATIVE PERCENT: 6.4 %
NEUTROPHILS ABSOLUTE: 3.7 K/UL (ref 1.7–7.7)
NEUTROPHILS RELATIVE PERCENT: 62.6 %
PDW BLD-RTO: 13.4 % (ref 12.4–15.4)
PLATELET # BLD: 144 K/UL (ref 135–450)
PMV BLD AUTO: 8.7 FL (ref 5–10.5)
POTASSIUM REFLEX MAGNESIUM: 4.1 MMOL/L (ref 3.5–5.1)
PROTHROMBIN TIME: 32.4 SEC (ref 10–13.2)
RBC # BLD: 4.45 M/UL (ref 4–5.2)
SARS-COV-2, NAAT: NOT DETECTED
SODIUM BLD-SCNC: 138 MMOL/L (ref 136–145)
WBC # BLD: 5.9 K/UL (ref 4–11)

## 2020-10-09 PROCEDURE — G0378 HOSPITAL OBSERVATION PER HR: HCPCS

## 2020-10-09 PROCEDURE — 85610 PROTHROMBIN TIME: CPT

## 2020-10-09 PROCEDURE — 94760 N-INVAS EAR/PLS OXIMETRY 1: CPT

## 2020-10-09 PROCEDURE — 6370000000 HC RX 637 (ALT 250 FOR IP): Performed by: NURSE PRACTITIONER

## 2020-10-09 PROCEDURE — 85025 COMPLETE CBC W/AUTO DIFF WBC: CPT

## 2020-10-09 PROCEDURE — 36415 COLL VENOUS BLD VENIPUNCTURE: CPT

## 2020-10-09 PROCEDURE — 80048 BASIC METABOLIC PNL TOTAL CA: CPT

## 2020-10-09 PROCEDURE — 2580000003 HC RX 258: Performed by: NURSE PRACTITIONER

## 2020-10-09 PROCEDURE — U0002 COVID-19 LAB TEST NON-CDC: HCPCS

## 2020-10-09 RX ORDER — WARFARIN SODIUM 3 MG/1
3 TABLET ORAL
Status: DISCONTINUED | OUTPATIENT
Start: 2020-10-09 | End: 2020-10-09 | Stop reason: HOSPADM

## 2020-10-09 RX ORDER — HYDROCODONE BITARTRATE AND ACETAMINOPHEN 10; 325 MG/1; MG/1
1 TABLET ORAL EVERY 6 HOURS PRN
Qty: 12 TABLET | Refills: 0 | Status: SHIPPED | OUTPATIENT
Start: 2020-10-09 | End: 2020-10-09 | Stop reason: SDUPTHER

## 2020-10-09 RX ORDER — HYDROCODONE BITARTRATE AND ACETAMINOPHEN 10; 325 MG/1; MG/1
1 TABLET ORAL EVERY 6 HOURS PRN
Qty: 12 TABLET | Refills: 0 | Status: SHIPPED | OUTPATIENT
Start: 2020-10-09 | End: 2020-10-12

## 2020-10-09 RX ADMIN — Medication 10 ML: at 10:07

## 2020-10-09 RX ADMIN — ACETAMINOPHEN 650 MG: 325 TABLET ORAL at 13:56

## 2020-10-09 RX ADMIN — LISINOPRIL 2.5 MG: 5 TABLET ORAL at 10:07

## 2020-10-09 RX ADMIN — METOPROLOL SUCCINATE 25 MG: 25 TABLET, EXTENDED RELEASE ORAL at 10:07

## 2020-10-09 ASSESSMENT — PAIN SCALES - GENERAL
PAINLEVEL_OUTOF10: 0
PAINLEVEL_OUTOF10: 3
PAINLEVEL_OUTOF10: 3

## 2020-10-09 ASSESSMENT — PAIN DESCRIPTION - PAIN TYPE: TYPE: ACUTE PAIN

## 2020-10-09 ASSESSMENT — PAIN DESCRIPTION - ORIENTATION: ORIENTATION: LEFT

## 2020-10-09 ASSESSMENT — PAIN DESCRIPTION - PROGRESSION: CLINICAL_PROGRESSION: NOT CHANGED

## 2020-10-09 ASSESSMENT — PAIN - FUNCTIONAL ASSESSMENT: PAIN_FUNCTIONAL_ASSESSMENT: PREVENTS OR INTERFERES SOME ACTIVE ACTIVITIES AND ADLS

## 2020-10-09 ASSESSMENT — PAIN DESCRIPTION - LOCATION: LOCATION: FOOT

## 2020-10-09 ASSESSMENT — PAIN DESCRIPTION - ONSET: ONSET: ON-GOING

## 2020-10-09 ASSESSMENT — PAIN DESCRIPTION - DESCRIPTORS: DESCRIPTORS: ACHING

## 2020-10-09 ASSESSMENT — PAIN DESCRIPTION - FREQUENCY: FREQUENCY: CONTINUOUS

## 2020-10-09 NOTE — PLAN OF CARE
Problem: Skin Integrity:  Goal: Will show no infection signs and symptoms  Description: Will show no infection signs and symptoms  10/9/2020 0735 by Lionel Nguyễn RN  Outcome: Ongoing  Note: Will show no signs/symptoms infection. Electronically signed by Lionel Nguyễn RN on 10/9/2020 at 7:35 AM    10/8/2020 2217 by Mary Wright, RN  Outcome: Ongoing  Note: Will monitor skin and mucous members. Will turn patient every 2 hours, monitor for friction and sheering, and change dressings as needed. Will preform skin assessment every shift. Goal: Absence of new skin breakdown  Description: Absence of new skin breakdown  10/9/2020 0735 by Lionel Nguyễn RN  Outcome: Ongoing  Note: Absence of new skin breakdown. Electronically signed by Lionel Nguyễn RN on 10/9/2020 at 7:35 AM    10/8/2020 2217 by Mary Wright RN  Outcome: Ongoing  Note: Will monitor skin and mucous members. Will turn patient every 2 hours, monitor for friction and sheering, and change dressings as needed. Will preform skin assessment every shift. Problem: Falls - Risk of:  Goal: Will remain free from falls  Description: Will remain free from falls  10/9/2020 0735 by Lionel Nguyễn RN  Outcome: Ongoing  Note: Will remain free from falls. Electronically signed by Lionel Nguyễn RN on 10/9/2020 at 7:35 AM    10/8/2020 2217 by Mary Wright, RN  Outcome: Ongoing  Note: Patient educated on fall prevention. Call light is within reach, bed locked in lowest position, personal items within reach, and bed alarm is on. Will round on patient per unit guidelines. Goal: Absence of physical injury  Description: Absence of physical injury  10/9/2020 0735 by Lionel Nguyễn RN  Outcome: Ongoing  Note: Absence of physical injury. Electronically signed by Lionel Nguyễn RN on 10/9/2020 at 7:35 AM    10/8/2020 2217 by Mary Wright RN  Outcome: Ongoing  Note: Pt assessed for fall risk and fall precautions put into place.  Bed in lowest position and wheels locked, call light within reach. Nonskid footwear in place. Patient educated on appropriate method of transfer and to call for assistance. Problem: OXYGENATION/RESPIRATORY FUNCTION  Goal: Patient will maintain patent airway  10/9/2020 0735 by Rosey Wilkins RN  Outcome: Ongoing  Note: Patient will maintain patent airway. Electronically signed by Rosey Wilkins RN on 10/9/2020 at 7:35 AM    10/8/2020 2217 by Marlee Ornelas RN  Outcome: Ongoing  Note: Patient will maintain patent airway. Goal: Patient will achieve/maintain normal respiratory rate/effort  Description: Respiratory rate and effort will be within normal limits for the patient  10/9/2020 0735 by Rosey Wilkins RN  Outcome: Ongoing  Note: Patient will achieve/maintain normal respiratory rate/effort. Electronically signed by Rosey Wilkins RN on 10/9/2020 at 7:36 AM    10/8/2020 2217 by Marlee Ornelas RN  Outcome: Ongoing  Note: Patient respiratory rate within normal limits. Will continue to monitor throughout the shift. Problem: HEMODYNAMIC STATUS  Goal: Patient has stable vital signs and fluid balance  10/9/2020 0735 by Rosey Wilkins RN  Outcome: Ongoing  Note: Patient has stable vital signs and fluid balance. Electronically signed by Rosey Wilkins RN on 10/9/2020 at 7:36 AM    10/8/2020 2217 by Marlee Ornelas RN  Outcome: Ongoing  Note: Vitals signs are stable. Intake and output are being recorded, will continue to monitor       Problem: FLUID AND ELECTROLYTE IMBALANCE  Goal: Fluid and electrolyte balance are achieved/maintained  10/9/2020 0735 by Rosey Wilkins RN  Outcome: Ongoing  Note: Fluid and electrolyte balance are achieved/maintained. Electronically signed by Rosey Wilkins RN on 10/9/2020 at 7:36 AM    10/8/2020 2217 by Marlee Ornelas RN  Outcome: Ongoing  Note: Patient will achieve and maintain fluid and electrolyte balance.         Problem: ACTIVITY INTOLERANCE/IMPAIRED MOBILITY  Goal: Mobility/activity is maintained at optimum level for patient  10/9/2020 0735 by La Alexander RN  Outcome: Ongoing  Note: Mobility/activity is maintained at optimum level for patient. Electronically signed by La Alexander RN on 10/9/2020 at 7:37 AM    10/8/2020 2217 by Missael Mazariegos RN  Outcome: Ongoing  Note: Early and frequent ambulqtion encouraged. Educated patient on importance of early ambulation. Patient assisted with ambulation. Will continue to monitor.

## 2020-10-09 NOTE — PROGRESS NOTES
Clinical Pharmacy Note  Warfarin Consult    Shyanne Marcos is a 80 y.o. female receiving warfarin managed by pharmacy. Patient being bridged with none. Warfarin Indication: A fib  Target INR range: 2-3   Dose prior to admission: 3 mg daily     Current warfarin drug-drug interactions:     Recent Labs     10/07/20  2017 10/08/20  0446 10/09/20  0424   HGB 15.8  --  15.1   HCT 47.2  --  44.9   INR  --  2.54* 2.76*       Assessment/Plan:    Warfarin 3 mg tonight. Daily PT/INR until stable within therapeutic range. Thank you for the consult. Will continue to follow.   Maggie Stallworth, [facility]

## 2020-10-09 NOTE — PLAN OF CARE
signed by Connie Odonnell RN on 10/9/2020 at 7:36 AM       Problem: HEMODYNAMIC STATUS  Goal: Patient has stable vital signs and fluid balance  10/9/2020 1736 by Connie Odonnell RN  Outcome: Completed  10/9/2020 0735 by Connie Odonnell RN  Outcome: Ongoing  Note: Patient has stable vital signs and fluid balance. Electronically signed by Connie Odonnell RN on 10/9/2020 at 7:36 AM       Problem: FLUID AND ELECTROLYTE IMBALANCE  Goal: Fluid and electrolyte balance are achieved/maintained  10/9/2020 1736 by Connie Odonnell RN  Outcome: Completed  10/9/2020 0735 by Connie Odonnell RN  Outcome: Ongoing  Note: Fluid and electrolyte balance are achieved/maintained. Electronically signed by Connie Odonnell RN on 10/9/2020 at 7:36 AM       Problem: ACTIVITY INTOLERANCE/IMPAIRED MOBILITY  Goal: Mobility/activity is maintained at optimum level for patient  10/9/2020 1736 by Connie Odonnell RN  Outcome: Completed  10/9/2020 0735 by Connie Odonnell RN  Outcome: Ongoing  Note: Mobility/activity is maintained at optimum level for patient. Electronically signed by Connie Odonnell RN on 10/9/2020 at 7:37 AM       Problem: ACTIVITY INTOLERANCE/IMPAIRED MOBILITY  Goal: Mobility/activity is maintained at optimum level for patient  10/9/2020 1736 by Connie Odonnell RN  Outcome: Completed  10/9/2020 0735 by Connie Odonnell RN  Outcome: Ongoing  Note: Mobility/activity is maintained at optimum level for patient.   Electronically signed by Connie Odonnell RN on 10/9/2020 at 7:37 AM

## 2020-10-09 NOTE — PLAN OF CARE
Problem: Skin Integrity:  Goal: Will show no infection signs and symptoms  Description: Will show no infection signs and symptoms  Outcome: Ongoing  Note: Will monitor skin and mucous members. Will turn patient every 2 hours, monitor for friction and sheering, and change dressings as needed. Will preform skin assessment every shift. Goal: Absence of new skin breakdown  Description: Absence of new skin breakdown  Outcome: Ongoing  Note: Will monitor skin and mucous members. Will turn patient every 2 hours, monitor for friction and sheering, and change dressings as needed. Will preform skin assessment every shift. Problem: Falls - Risk of:  Goal: Will remain free from falls  Description: Will remain free from falls  Outcome: Ongoing  Note: Patient educated on fall prevention. Call light is within reach, bed locked in lowest position, personal items within reach, and bed alarm is on. Will round on patient per unit guidelines. Goal: Absence of physical injury  Description: Absence of physical injury  Outcome: Ongoing  Note: Pt assessed for fall risk and fall precautions put into place. Bed in lowest position and wheels locked, call light within reach. Nonskid footwear in place. Patient educated on appropriate method of transfer and to call for assistance. Problem: OXYGENATION/RESPIRATORY FUNCTION  Goal: Patient will maintain patent airway  Outcome: Ongoing  Note: Patient will maintain patent airway. Goal: Patient will achieve/maintain normal respiratory rate/effort  Description: Respiratory rate and effort will be within normal limits for the patient  Outcome: Ongoing  Note: Patient respiratory rate within normal limits. Will continue to monitor throughout the shift. Problem: HEMODYNAMIC STATUS  Goal: Patient has stable vital signs and fluid balance  Outcome: Ongoing  Note: Vitals signs are stable.   Intake and output are being recorded, will continue to monitor       Problem: FLUID AND ELECTROLYTE IMBALANCE  Goal: Fluid and electrolyte balance are achieved/maintained  Outcome: Ongoing  Note: Patient will achieve and maintain fluid and electrolyte balance. Problem: ACTIVITY INTOLERANCE/IMPAIRED MOBILITY  Goal: Mobility/activity is maintained at optimum level for patient  Outcome: Ongoing  Note: Early and frequent ambulqtion encouraged. Educated patient on importance of early ambulation. Patient assisted with ambulation. Will continue to monitor.

## 2020-10-09 NOTE — CARE COORDINATION
[facility] obtained. Arranged for transfer to Terrence Santiago at [facility] via [employer]. Patient aware and in agreement with plans as is daughter. Co pay is $0 days 1-20 then $178 days . PAS completed. Covid neg 10-9.    Report to [phone] [address]   Electronically signed by Tootie Wilcox on 10/9/2020 at 4:42 PM

## 2020-10-13 NOTE — DISCHARGE SUMMARY
Hospital Medicine Discharge Summary    Patient ID: Candida Church      Patient's PCP: Zunilda Gay MD    Admit Date: 10/7/2020     Discharge Date: 10/9/2020      Admitting Physician: DO Ethan     Discharge Physician: Sole Gregg MD     Discharge Diagnoses: Active Hospital Problems    Diagnosis Date Noted    Generalized weakness [R53.1] 10/08/2020       The patient was seen and examined on day of discharge and this discharge summary is in conjunction with any daily progress note from day of discharge. Hospital Course:     Generalized weakness:   No evidence of sepsis, hemodynamically stable  No evidence of anemia or renal failure  No clinical evidence of stroke  CT head  in a.m. Urine culture in process-> possible early developing UTI  PT, OT  Planning on SNF placement        CAD: Continue home medication regimen  Hypertension: Continue home medication regimen     Glaucoma: Continue home medication regiment         Exam:     /83   Pulse 87   Temp 98 °F (36.7 °C) (Oral)   Resp 16   Ht 5' 2\" (1.575 m)   Wt 116 lb 6.5 oz (52.8 kg)   SpO2 96%   BMI 21.29 kg/m²     General appearance: No apparent distress, appears stated age and cooperative. HEENT: Pupils equal, round, and reactive to light. Conjunctivae/corneas clear. Neck: Supple, with full range of motion. No jugular venous distention. Trachea midline. Respiratory:  Normal respiratory effort. Clear to auscultation, bilaterally without Rales/Wheezes/Rhonchi. Cardiovascular: Regular rate and rhythm with normal S1/S2 without murmurs, rubs or gallops. Abdomen: Soft, non-tender, non-distended with normal bowel sounds. Musculoskelatal: No clubbing, cyanosis or edema bilaterally. Full range of motion without deformity. Skin: Skin color, texture, turgor normal.  No rashes or lesions. Neurologic:  Neurovascularly intact without any focal sensory/motor deficits.  Cranial nerves: II-XII intact, grossly non-focal.  Psychiatric: Alert and oriented, thought content appropriate, normal insight      Consults:     IP CONSULT TO SOCIAL WORK    Significant Diagnostic Studies:        Radiology:  XR KNEE LEFT (1-2 VIEWS)   Final Result   No acute abnormality of the knee.           XR CHEST PORTABLE   Final Result   1. No acute cardiopulmonary process identified.                 PCP/SNF to follow up: F/u chronic conditions    Disposition:  SNF    Condition on D/C:  Stable     Discharge Instructions/Follow-up:  F/u with PCP within 1 week    Code Status:  Prior     Activity: activity as tolerated    Diet: Cardiac    Labs: For convenience and continuity at follow-up the following most recent labs are provided:      CBC:    Lab Results   Component Value Date    WBC 5.9 10/09/2020    HGB 15.1 10/09/2020    HCT 44.9 10/09/2020     10/09/2020       Renal:    Lab Results   Component Value Date     10/09/2020    K 4.1 10/09/2020     10/09/2020    CO2 21 10/09/2020    BUN 12 10/09/2020    CREATININE 0.6 10/09/2020    CALCIUM 9.7 10/09/2020       Discharge Medications:     Discharge Medication List as of 10/9/2020  4:39 PM           Details   HYDROcodone-acetaminophen (NORCO)  MG per tablet Take 1 tablet by mouth every 6 hours as needed for Pain for up to 3 days. , Disp-12 tablet,R-0Print              Details   atorvastatin (LIPITOR) 20 MG tablet 20 mg daily Historical Med      donepezil (ARICEPT) 10 MG tablet Historical Med      lisinopril (PRINIVIL;ZESTRIL) 2.5 MG tablet Historical Med      perphenazine-amitriptyline (ETRAFON;TRIAVIL) 2-25 MG per tablet Take 1 tablet by mouth nightly Historical Med      warfarin (COUMADIN) 1 MG tablet Take 3 mg by mouth daily Historical Med      docusate sodium (COLACE, DULCOLAX) 100 MG CAPS Take 100 mg by mouth 2 times dailyOTC      furosemide (LASIX) 20 MG tablet Take 1 tablet by mouth daily, Disp-60 tablet, R-3      ferrous sulfate 325 (65 FE) MG tablet Take 325 mg by mouth Daily with supper      folic acid (FOLVITE) 1 MG tablet Take 1 mg by mouth daily      amiodarone (CORDARONE) 200 MG tablet Take 200 mg by mouth daily      ascorbic acid (VITAMIN C) 500 MG tablet Take 500 mg by mouth Daily with supper      latanoprost (XALATAN) 0.005 % ophthalmic solution Place 1 drop into both eyes nightly Historical Med      nitroGLYCERIN (NITROSTAT) 0.4 MG SL tablet Place 0.4 mg under the tongue every 5 minutes as needed for Chest pain Dissolve 1 tab under tongue at first sign of chest pain. May repeat every 5 minutes until relief is obtained. If pain persists after taking 3 tabs in a 15-minute period, or the pain i s different than is typically experienced, call 9-1-1 immediately. metoprolol succinate (TOPROL XL) 25 MG extended release tablet Take 25 mg by mouth daily             Time Spent on discharge is more than 45 minutes in the examination, evaluation, counseling and review of medications and discharge plan. Signed: Carmen Anderson MD   10/13/2020      Thank you Louie To MD for the opportunity to be involved in this patient's care. If you have any questions or concerns please feel free to contact me at [phone].

## 2021-12-12 ENCOUNTER — APPOINTMENT (OUTPATIENT)
Dept: GENERAL RADIOLOGY | Age: 86
DRG: 543 | End: 2021-12-12
Payer: MEDICARE

## 2021-12-12 ENCOUNTER — HOSPITAL ENCOUNTER (INPATIENT)
Age: 86
LOS: 2 days | Discharge: NURSING, REHAB OR LONG-TERM CARE | DRG: 543 | End: 2021-12-14
Attending: EMERGENCY MEDICINE | Admitting: INTERNAL MEDICINE
Payer: MEDICARE

## 2021-12-12 DIAGNOSIS — Z79.01 CHRONIC ANTICOAGULATION: ICD-10-CM

## 2021-12-12 DIAGNOSIS — S42.202A CLOSED FRACTURE OF PROXIMAL END OF LEFT HUMERUS, UNSPECIFIED FRACTURE MORPHOLOGY, INITIAL ENCOUNTER: Primary | ICD-10-CM
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
## 2021-12-12 DIAGNOSIS — R62.7 FTT (FAILURE TO THRIVE) IN ADULT: ICD-10-CM

## 2021-12-12 PROBLEM — N39.0 UTI (URINARY TRACT INFECTION): Status: ACTIVE | Noted: 2021-12-12

## 2021-12-12 PROBLEM — I16.0 HYPERTENSIVE URGENCY: Status: ACTIVE | Noted: 2021-12-12

## 2021-12-12 PROBLEM — N39.0 UTI (URINARY TRACT INFECTION): Status: RESOLVED | Noted: 2021-12-12 | Resolved: 2021-12-12

## 2021-12-12 LAB
ABO/RH: NORMAL
ANION GAP SERPL CALCULATED.3IONS-SCNC: 12 MMOL/L (ref 3–16)
ANTIBODY SCREEN: NORMAL
BASOPHILS ABSOLUTE: 0 K/UL (ref 0–0.2)
BASOPHILS RELATIVE PERCENT: 0.7 %
BUN BLDV-MCNC: 18 MG/DL (ref 7–20)
CALCIUM SERPL-MCNC: 9.5 MG/DL (ref 8.3–10.6)
CHLORIDE BLD-SCNC: 105 MMOL/L (ref 99–110)
CO2: 21 MMOL/L (ref 21–32)
CREAT SERPL-MCNC: 0.7 MG/DL (ref 0.6–1.2)
EOSINOPHILS ABSOLUTE: 0.1 K/UL (ref 0–0.6)
EOSINOPHILS RELATIVE PERCENT: 1.3 %
GFR AFRICAN AMERICAN: >60
GFR NON-AFRICAN AMERICAN: >60
GLUCOSE BLD-MCNC: 133 MG/DL (ref 70–99)
HCT VFR BLD CALC: 47.1 % (ref 36–48)
HEMOGLOBIN: 15.8 G/DL (ref 12–16)
INR BLD: 2.1 (ref 0.88–1.12)
LYMPHOCYTES ABSOLUTE: 2.7 K/UL (ref 1–5.1)
LYMPHOCYTES RELATIVE PERCENT: 39.8 %
MCH RBC QN AUTO: 33.6 PG (ref 26–34)
MCHC RBC AUTO-ENTMCNC: 33.6 G/DL (ref 31–36)
MCV RBC AUTO: 100.2 FL (ref 80–100)
MONOCYTES ABSOLUTE: 0.4 K/UL (ref 0–1.3)
MONOCYTES RELATIVE PERCENT: 5.6 %
NEUTROPHILS ABSOLUTE: 3.5 K/UL (ref 1.7–7.7)
NEUTROPHILS RELATIVE PERCENT: 52.6 %
PDW BLD-RTO: 14.4 % (ref 12.4–15.4)
PLATELET # BLD: 209 K/UL (ref 135–450)
PMV BLD AUTO: 9 FL (ref 5–10.5)
POTASSIUM REFLEX MAGNESIUM: 4.3 MMOL/L (ref 3.5–5.1)
PROTHROMBIN TIME: 24.5 SEC (ref 9.9–12.7)
RBC # BLD: 4.7 M/UL (ref 4–5.2)
REASON FOR REJECTION: NORMAL
REJECTED TEST: NORMAL
SODIUM BLD-SCNC: 138 MMOL/L (ref 136–145)
WBC # BLD: 6.7 K/UL (ref 4–11)

## 2021-12-12 PROCEDURE — 6360000002 HC RX W HCPCS: Performed by: EMERGENCY MEDICINE

## 2021-12-12 PROCEDURE — 96374 THER/PROPH/DIAG INJ IV PUSH: CPT

## 2021-12-12 PROCEDURE — G0378 HOSPITAL OBSERVATION PER HR: HCPCS

## 2021-12-12 PROCEDURE — 73030 X-RAY EXAM OF SHOULDER: CPT

## 2021-12-12 PROCEDURE — 73502 X-RAY EXAM HIP UNI 2-3 VIEWS: CPT

## 2021-12-12 PROCEDURE — 85025 COMPLETE CBC W/AUTO DIFF WBC: CPT

## 2021-12-12 PROCEDURE — 6360000002 HC RX W HCPCS

## 2021-12-12 PROCEDURE — 71045 X-RAY EXAM CHEST 1 VIEW: CPT

## 2021-12-12 PROCEDURE — 6360000002 HC RX W HCPCS: Performed by: INTERNAL MEDICINE

## 2021-12-12 PROCEDURE — 6370000000 HC RX 637 (ALT 250 FOR IP): Performed by: INTERNAL MEDICINE

## 2021-12-12 PROCEDURE — 86900 BLOOD TYPING SEROLOGIC ABO: CPT

## 2021-12-12 PROCEDURE — 80048 BASIC METABOLIC PNL TOTAL CA: CPT

## 2021-12-12 PROCEDURE — 1200000000 HC SEMI PRIVATE

## 2021-12-12 PROCEDURE — 86850 RBC ANTIBODY SCREEN: CPT

## 2021-12-12 PROCEDURE — 85610 PROTHROMBIN TIME: CPT

## 2021-12-12 PROCEDURE — 36415 COLL VENOUS BLD VENIPUNCTURE: CPT

## 2021-12-12 PROCEDURE — 2580000003 HC RX 258: Performed by: INTERNAL MEDICINE

## 2021-12-12 PROCEDURE — 96375 TX/PRO/DX INJ NEW DRUG ADDON: CPT

## 2021-12-12 PROCEDURE — 96376 TX/PRO/DX INJ SAME DRUG ADON: CPT

## 2021-12-12 PROCEDURE — 2500000003 HC RX 250 WO HCPCS: Performed by: EMERGENCY MEDICINE

## 2021-12-12 PROCEDURE — 99285 EMERGENCY DEPT VISIT HI MDM: CPT

## 2021-12-12 PROCEDURE — 86901 BLOOD TYPING SEROLOGIC RH(D): CPT

## 2021-12-12 RX ORDER — 0.9 % SODIUM CHLORIDE 0.9 %
500 INTRAVENOUS SOLUTION INTRAVENOUS PRN
Status: DISCONTINUED | OUTPATIENT
Start: 2021-12-12 | End: 2021-12-15 | Stop reason: HOSPADM

## 2021-12-12 RX ORDER — FUROSEMIDE 20 MG/1
20 TABLET ORAL DAILY
Status: DISCONTINUED | OUTPATIENT
Start: 2021-12-12 | End: 2021-12-12

## 2021-12-12 RX ORDER — LISINOPRIL 5 MG/1
2.5 TABLET ORAL DAILY
Status: DISCONTINUED | OUTPATIENT
Start: 2021-12-12 | End: 2021-12-15 | Stop reason: HOSPADM

## 2021-12-12 RX ORDER — AMIODARONE HYDROCHLORIDE 200 MG/1
200 TABLET ORAL DAILY
Status: DISCONTINUED | OUTPATIENT
Start: 2021-12-12 | End: 2021-12-12

## 2021-12-12 RX ORDER — LEVOTHYROXINE SODIUM 0.05 MG/1
50 TABLET ORAL DAILY
COMMUNITY

## 2021-12-12 RX ORDER — PERPHENAZINE/AMITRIPTYLINE HCL 2 MG-25 MG
1 TABLET ORAL NIGHTLY
Status: DISCONTINUED | OUTPATIENT
Start: 2021-12-12 | End: 2021-12-12

## 2021-12-12 RX ORDER — PSEUDOEPHEDRINE HCL 30 MG
100 TABLET ORAL 2 TIMES DAILY
Status: DISCONTINUED | OUTPATIENT
Start: 2021-12-12 | End: 2021-12-12

## 2021-12-12 RX ORDER — ONDANSETRON 2 MG/ML
4 INJECTION INTRAMUSCULAR; INTRAVENOUS EVERY 6 HOURS PRN
Status: DISCONTINUED | OUTPATIENT
Start: 2021-12-12 | End: 2021-12-15 | Stop reason: HOSPADM

## 2021-12-12 RX ORDER — METOPROLOL SUCCINATE 25 MG/1
25 TABLET, EXTENDED RELEASE ORAL DAILY
Status: DISCONTINUED | OUTPATIENT
Start: 2021-12-12 | End: 2021-12-15 | Stop reason: HOSPADM

## 2021-12-12 RX ORDER — ATORVASTATIN CALCIUM 20 MG/1
20 TABLET, FILM COATED ORAL DAILY
Status: DISCONTINUED | OUTPATIENT
Start: 2021-12-12 | End: 2021-12-12

## 2021-12-12 RX ORDER — POTASSIUM CHLORIDE 7.45 MG/ML
10 INJECTION INTRAVENOUS PRN
Status: DISCONTINUED | OUTPATIENT
Start: 2021-12-12 | End: 2021-12-15 | Stop reason: HOSPADM

## 2021-12-12 RX ORDER — LATANOPROST 50 UG/ML
1 SOLUTION/ DROPS OPHTHALMIC NIGHTLY
Status: DISCONTINUED | OUTPATIENT
Start: 2021-12-12 | End: 2021-12-12 | Stop reason: ALTCHOICE

## 2021-12-12 RX ORDER — ACETAMINOPHEN 325 MG/1
650 TABLET ORAL EVERY 4 HOURS PRN
Status: DISCONTINUED | OUTPATIENT
Start: 2021-12-12 | End: 2021-12-15 | Stop reason: HOSPADM

## 2021-12-12 RX ORDER — FENTANYL CITRATE 50 UG/ML
INJECTION, SOLUTION INTRAMUSCULAR; INTRAVENOUS
Status: COMPLETED
Start: 2021-12-12 | End: 2021-12-12

## 2021-12-12 RX ORDER — FERROUS SULFATE 325(65) MG
325 TABLET ORAL
Status: DISCONTINUED | OUTPATIENT
Start: 2021-12-12 | End: 2021-12-12

## 2021-12-12 RX ORDER — HYDRALAZINE HYDROCHLORIDE 20 MG/ML
5 INJECTION INTRAMUSCULAR; INTRAVENOUS ONCE
Status: COMPLETED | OUTPATIENT
Start: 2021-12-12 | End: 2021-12-12

## 2021-12-12 RX ORDER — ASCORBIC ACID 500 MG
500 TABLET ORAL
Status: DISCONTINUED | OUTPATIENT
Start: 2021-12-12 | End: 2021-12-15 | Stop reason: HOSPADM

## 2021-12-12 RX ORDER — HYDROMORPHONE HYDROCHLORIDE 1 MG/ML
0.5 INJECTION, SOLUTION INTRAMUSCULAR; INTRAVENOUS; SUBCUTANEOUS EVERY 4 HOURS PRN
Status: DISCONTINUED | OUTPATIENT
Start: 2021-12-12 | End: 2021-12-15 | Stop reason: HOSPADM

## 2021-12-12 RX ORDER — FENTANYL CITRATE 50 UG/ML
100 INJECTION, SOLUTION INTRAMUSCULAR; INTRAVENOUS ONCE
Status: COMPLETED | OUTPATIENT
Start: 2021-12-12 | End: 2021-12-12

## 2021-12-12 RX ORDER — POTASSIUM CHLORIDE 20 MEQ/1
40 TABLET, EXTENDED RELEASE ORAL PRN
Status: DISCONTINUED | OUTPATIENT
Start: 2021-12-12 | End: 2021-12-15 | Stop reason: HOSPADM

## 2021-12-12 RX ORDER — NITROGLYCERIN 0.4 MG/1
0.4 TABLET SUBLINGUAL EVERY 5 MIN PRN
Status: DISCONTINUED | OUTPATIENT
Start: 2021-12-12 | End: 2021-12-15 | Stop reason: HOSPADM

## 2021-12-12 RX ORDER — KETAMINE HYDROCHLORIDE 100 MG/ML
40 INJECTION, SOLUTION INTRAMUSCULAR; INTRAVENOUS ONCE
Status: COMPLETED | OUTPATIENT
Start: 2021-12-12 | End: 2021-12-12

## 2021-12-12 RX ORDER — HYDRALAZINE HYDROCHLORIDE 20 MG/ML
10 INJECTION INTRAMUSCULAR; INTRAVENOUS EVERY 6 HOURS PRN
Status: DISCONTINUED | OUTPATIENT
Start: 2021-12-12 | End: 2021-12-15 | Stop reason: HOSPADM

## 2021-12-12 RX ORDER — FOLIC ACID 1 MG/1
1 TABLET ORAL DAILY
Status: DISCONTINUED | OUTPATIENT
Start: 2021-12-12 | End: 2021-12-12

## 2021-12-12 RX ORDER — SODIUM CHLORIDE 9 MG/ML
INJECTION, SOLUTION INTRAVENOUS CONTINUOUS
Status: DISCONTINUED | OUTPATIENT
Start: 2021-12-12 | End: 2021-12-15 | Stop reason: HOSPADM

## 2021-12-12 RX ORDER — DONEPEZIL HYDROCHLORIDE 5 MG/1
10 TABLET, FILM COATED ORAL NIGHTLY
Status: DISCONTINUED | OUTPATIENT
Start: 2021-12-12 | End: 2021-12-12

## 2021-12-12 RX ORDER — KETAMINE HYDROCHLORIDE 100 MG/ML
20 INJECTION, SOLUTION INTRAMUSCULAR; INTRAVENOUS ONCE
Status: COMPLETED | OUTPATIENT
Start: 2021-12-12 | End: 2021-12-12

## 2021-12-12 RX ADMIN — HYDRALAZINE HYDROCHLORIDE 5 MG: 20 INJECTION INTRAMUSCULAR; INTRAVENOUS at 08:40

## 2021-12-12 RX ADMIN — HYDROMORPHONE HYDROCHLORIDE 0.5 MG: 1 INJECTION, SOLUTION INTRAMUSCULAR; INTRAVENOUS; SUBCUTANEOUS at 12:28

## 2021-12-12 RX ADMIN — FENTANYL CITRATE 25 MCG: 50 INJECTION, SOLUTION INTRAMUSCULAR; INTRAVENOUS at 08:56

## 2021-12-12 RX ADMIN — KETAMINE HYDROCHLORIDE 20 MG: 100 INJECTION INTRAMUSCULAR; INTRAVENOUS at 07:04

## 2021-12-12 RX ADMIN — ONDANSETRON 4 MG: 2 INJECTION INTRAMUSCULAR; INTRAVENOUS at 14:17

## 2021-12-12 RX ADMIN — Medication 1000 MG: at 18:29

## 2021-12-12 RX ADMIN — SODIUM CHLORIDE: 9 INJECTION, SOLUTION INTRAVENOUS at 18:44

## 2021-12-12 RX ADMIN — KETAMINE HYDROCHLORIDE 40 MG: 100 INJECTION INTRAMUSCULAR; INTRAVENOUS at 06:57

## 2021-12-12 RX ADMIN — HYDRALAZINE HYDROCHLORIDE 10 MG: 20 INJECTION INTRAMUSCULAR; INTRAVENOUS at 21:43

## 2021-12-12 ASSESSMENT — PAIN SCALES - GENERAL
PAINLEVEL_OUTOF10: 10

## 2021-12-12 NOTE — CONSULTS
PATIENT NAME:                     Edelmira Fairchild  YOB: 1928   MEDICAL RECORD#         [id number]  SURGEON:                 Genoveva Goldberg MD      CHIEF COMPLAINT: left shoulder pain. History:Ms. Edelmira Fairchild is being seen for evaluation of of the left shoulder. The patient is a 57-year-old female who reportedly lost her balance while going to the bathroom. The patient does ambulate with a walker. She has numerous contractures. She does live with her daughter. She is right-hand dominant. She reports severe left shoulder pain. Orthopedics is being consulted for the left shoulder issues. PAST MEDICAL HISTORY:   Past Medical History:   Diagnosis Date    Anemia     CAD (coronary artery disease)     Glaucoma     High blood pressure     [facility]ocardial infarction [facility])         MEDICATIONS: The patient's medication reconciliation form was extensively reviewed and noted. ALLERGIES:   Allergies   Allergen Reactions    Pcn [Penicillins] Hives        SOCIAL HISTORY:   Social History     Socioeconomic History    Marital status:      Spouse name: Not on file    Number of children: Not on file    Years of education: Not on file    Highest education level: Not on file   Occupational History    Occupation: Retired   Tobacco Use    Smoking status: Never Smoker    Smokeless tobacco: Not on file   Substance and Sexual Activity    Alcohol use: Yes     Alcohol/week: 0.0 standard drinks     Comment: occasionally    Drug use: No    Sexual activity: Not on file   Other Topics Concern    Not on file   Social History Narrative    Not on file     Social Determinants of Health     Financial Resource Strain:     Difficulty of Paying Living Expenses: Not on file   Food Insecurity:     Worried About Running Out of Food in the Last Year: Not on file    Oz of Food in the Last Year: Not on file   Transportation Needs:     Lack of Transportation (Medical):  Not on file  Lack of Transportation (Non-Medical): Not on file   Physical Activity:     Days of Exercise per Week: Not on file    Minutes of Exercise per Session: Not on file   Stress:     Feeling of Stress : Not on file   Social Connections:     Frequency of Communication with Friends and Family: Not on file    Frequency of Social Gatherings with Friends and Family: Not on file    Attends Taoism Services: Not on file    Active Member of [address] [place] [address] or Organizations: Not on file    Attends Club or Organization Meetings: Not on file    Marital Status: Not on file   Intimate Partner Violence:     Fear of Current or Ex-Partner: Not on file    Emotionally Abused: Not on file    Physically Abused: Not on file    Sexually Abused: Not on file   Housing Stability:     Unable to Pay for Housing in the Last Year: Not on file    Number of [place] in the Last Year: Not on file    Unstable Housing in the Last Year: Not on file        PAST SURGICAL HISTORY:   Past Surgical History:   Procedure Laterality Date    APPENDECTOMY      HIP ARTHROPLASTY Right 05/25/2017    RIGHT BIPOLAR HIP HEMIARTHROPLASTY        PACEMAKER INSERTION          REVIEW OF SYSTEMS: Negative for fever, chills or night sweats. Review of   systems is negative except as mentioned in the history of present illness. FAMILY MEDICAL HISTORY: History reviewed. No pertinent family history. Physical: Ms. Teto Grey appears well, she is in no apparent distress, she demonstrates appropriate mood & affect. She is alert and oriented to person, place and time. Examination of the neck reveals no evidence of tenderness. The patient has moderate swelling of the left shoulder. She has severe tenderness to palpation over the proximal humerus. She is nontender about the left elbow, wrist or hand. She is neurovascularly intact in the left upper extremity. She is able to flex and extend the left elbow and the left wrist without difficulty.   She has numerous contractures in both hands. She also appears to have some contractures in both lower extremities. Examination of the skin reveals no lesions or ulcerations. She is neurovascularly intact. Light range of motion of the left shoulder was severely painful. X-rays: 3 views of the left shoulder obtained in the emergency room were extensively reviewed. There is a comminuted impacted left proximal humerus fracture. The overall alignment is acceptable. IMPRESSION: left shoulder proximal humerus fracture. PLAN: At this time, we will treat the patient nonoperatively. She will continue with the sling. She is to not bear weight through the left upper extremity. She will ice the affected area is much as possible. She will sleep in a reclined position. She may begin light range of motion to the elbow, wrist and hand. The patient should follow up with me in 3 weeks and we will reassess her then.

## 2021-12-12 NOTE — ED NOTES
Patient's clothes removed and placed in hospital gown. Patient currently on 5L O2 NC.       Sona Teixeira RN  12/12/21 2488

## 2021-12-12 NOTE — ED NOTES
25 mcg of Fentanyl given at this time.       [facility]  12/12/21 2598

## 2021-12-12 NOTE — PROGRESS NOTES
Pt. Brought to floor via stretcher. Moved with assist. Pt. C/o nausea. Bucket provided. Released orders and gave zofran. At this time pt is only able to give me her name, she is unable to tell me where she is from or where she is at. Her BP is elevated. She denies pain. Sling on left arm. Neuro check performed, full sensation, +3 cap refill, denies numbness or tingling in LUE.

## 2021-12-12 NOTE — ED PROVIDER NOTES
eMERGENCY dEPARTMENT eNCOUnter        [address] [facility]    Chief Complaint   Patient presents with    Fall    Arm Injury       [facility]    Elvin Humphrey is a 80 y.o. female who presents to the ER for evaluation mechanical fall and injury while attempting to go the bathroom. She has a prior history of hemiarthroplasty of the right hip, with positive severe shoulder tenderness possible deformity chronic contractures of her hands, complains of left lateral chest wall tenderness left shoulder pain, denies hip pain. Denies head trauma. Denies neck or back pain. Moderate to severe pain with any attempted range of motion. REVIEW OF SYSTEMS    See HPI for further details. Review of systems otherwise negative. PAST MEDICAL HISTORY    Past Medical History:   Diagnosis Date    Anemia     CAD (coronary artery disease)     Glaucoma     High blood pressure     Myocardial infarction [facility])        SURGICAL HISTORY    Past Surgical History:   Procedure Laterality Date    APPENDECTOMY      HIP ARTHROPLASTY Right 05/25/2017    RIGHT BIPOLAR HIP HEMIARTHROPLASTY        PACEMAKER INSERTION         CURRENT MEDICATIONS    Current Outpatient Rx   Medication Sig Dispense Refill    levothyroxine (SYNTHROID) 50 MCG tablet Take 50 mcg by mouth Daily      lisinopril (PRINIVIL;ZESTRIL) 2.5 MG tablet       warfarin (COUMADIN) 1 MG tablet Take 3 mg by mouth daily       metoprolol succinate (TOPROL XL) 25 MG extended release tablet Take 25 mg by mouth daily      latanoprost (XALATAN) 0.005 % ophthalmic solution Place 1 drop into both eyes nightly       nitroGLYCERIN (NITROSTAT) 0.4 MG SL tablet Place 0.4 mg under the tongue every 5 minutes as needed for Chest pain Dissolve 1 tab under tongue at first sign of chest pain. May repeat every 5 minutes until relief is obtained. If pain persists after taking 3 tabs in a 15-minute period, or the pain is different than is typically experienced, call 9-1-1 immediately. ALLERGIES    Allergies   Allergen Reactions    Pcn [Penicillins] Hives       FAMILY HISTORY    History reviewed. No pertinent family history. SOCIAL HISTORY    Social History     Socioeconomic History    Marital status:      Spouse name: None    Number of children: None    Years of education: None    Highest education level: None   Occupational History    Occupation: Retired   Tobacco Use    Smoking status: Never Smoker    Smokeless tobacco: None   Substance and Sexual Activity    Alcohol use: Yes     Alcohol/week: 0.0 standard drinks     Comment: occasionally    Drug use: No    Sexual activity: None   Other Topics Concern    None   Social History Narrative    None     Social Determinants of Health     Financial Resource Strain:     Difficulty of Paying Living Expenses: Not on file   Food Insecurity:     Worried About Running Out of Food in the Last Year: Not on file    Oz of Food in the Last Year: Not on file   Transportation Needs:     Lack of Transportation (Medical): Not on file    Lack of Transportation (Non-Medical):  Not on file   Physical Activity:     Days of Exercise per Week: Not on file    Minutes of Exercise per Session: Not on file   Stress:     Feeling of Stress : Not on file   Social Connections:     Frequency of Communication with Friends and Family: Not on file    Frequency of Social Gatherings with Friends and Family: Not on file    Attends Mosque Services: Not on file    Active Member of Clubs or Organizations: Not on file    Attends Club or Organization Meetings: Not on file    Marital Status: Not on file   Intimate Partner Violence:     Fear of Current or Ex-Partner: Not on file    Emotionally Abused: Not on file    Physically Abused: Not on file    Sexually Abused: Not on file   Housing Stability:     Unable to Pay for Housing in the Last Year: Not on file    Number of [place] in the Last Year: Not on file    Unstable Housing in the Last Year: Not on file       PHYSICAL EXAM    VITAL SIGNS: /75   Pulse 70   Temp 97.9 °F (36.6 °C) (Axillary)   Resp 18   Ht 5' (1.524 m)   Wt 110 lb (49.9 kg)   SpO2 97%   BMI 21.48 kg/m²   Constitutional:  Well developed, well nourished, no acute distress, non-toxic appearance   HENT:  Atraumatic, external ears normal, nose normal, oropharynx moist.  Neck- normal range of motion, no tenderness, supple   Respiratory:  No respiratory distress, normal breath sounds. Cardiovascular:  Normal rate, normal rhythm, no murmurs, no gallops, no rubs   GI:  Soft, nondistended, normal bowel sounds, nontender   Musculoskeletal: Positive marked tenderness and and possible deformity of the left shoulder no pulse deficit mild chest wall tenderness on the left without crepitus diminished breath sounds bilaterally chronic contractures of her upper extremities, hands, possible mild tenderness to left hip. Integument:  Well hydrated, no rash   Neurologic: Sensation, decreased range of motion of the left shoulder with any attempted range of motion, sensation is intact, GCS is 15    RADIOLOGY/PROCEDURES    X-ray left shoulder: Positive fracture of the left shoulder, humeral head with minimal inferior displacement  Chest x-ray: No pneumothorax  Left hip: No fracture  ED COURSE & MEDICAL DECISION MAKING    Pertinent Labs & Imaging studies reviewed. (See chart for details)  Analgesia and repeat dosing analgesia were provided. Consultation with orthopedics performed. Shoulder sling application analgesia and repeat dosing analgesia. Reevaluation of clinical condition and the need for extended-care facility placement. Total critical care time provided today was 35 minutes. This excludes seperately billable procedures and family discussion time.  Critical care time provided for obtaining history, conducting a physical exam, performing and monitoring interventions, ordering, collecting and interpreting tests, and establishing medical decision-making. There was a potential for life/limb threatening pathology requiring close evaluation and intervention with concern for patient decompensation. FINAL IMPRESSION    1. Humeral head fracture left closed  2.   Failure to thrive in adult        Barbara Wills MD  11/53/34 8040

## 2021-12-12 NOTE — ED NOTES
25mcg fentanyl given at this time     Tong Pablo, [address] [facility]  12/12/21 7251

## 2021-12-12 NOTE — ED NOTES
Pt to ED via EMS for a fall at home. Pt lives with daughter. Pt c/o left arm pain and is screaming when touched. Md at bedside evaluating pt. Sara Elam [phone]. Pt is also contracted on upper extremities.         Geeta Coronel RN  12/12/21 9747

## 2021-12-12 NOTE — ED NOTES
Report called to Malka Hughes RN on 4T. Stated they could not come down for patient and requested patient transport move patient to floor.       Yaz Hernandez RN  12/12/21 9401

## 2021-12-13 ENCOUNTER — APPOINTMENT (OUTPATIENT)
Dept: GENERAL RADIOLOGY | Age: 86
DRG: 543 | End: 2021-12-13
Payer: MEDICARE

## 2021-12-13 LAB
ANION GAP SERPL CALCULATED.3IONS-SCNC: 14 MMOL/L (ref 3–16)
BASOPHILS ABSOLUTE: 0.1 K/UL (ref 0–0.2)
BASOPHILS RELATIVE PERCENT: 0.7 %
BUN BLDV-MCNC: 17 MG/DL (ref 7–20)
CALCIUM SERPL-MCNC: 9.5 MG/DL (ref 8.3–10.6)
CHLORIDE BLD-SCNC: 103 MMOL/L (ref 99–110)
CO2: 20 MMOL/L (ref 21–32)
CREAT SERPL-MCNC: 0.6 MG/DL (ref 0.6–1.2)
EOSINOPHILS ABSOLUTE: 0 K/UL (ref 0–0.6)
EOSINOPHILS RELATIVE PERCENT: 0.1 %
GFR AFRICAN AMERICAN: >60
GFR NON-AFRICAN AMERICAN: >60
GLUCOSE BLD-MCNC: 117 MG/DL (ref 70–99)
HCT VFR BLD CALC: 40 % (ref 36–48)
HEMOGLOBIN: 13.2 G/DL (ref 12–16)
INR BLD: 2.35 (ref 0.88–1.12)
LYMPHOCYTES ABSOLUTE: 1.4 K/UL (ref 1–5.1)
LYMPHOCYTES RELATIVE PERCENT: 12.6 %
MCH RBC QN AUTO: 33 PG (ref 26–34)
MCHC RBC AUTO-ENTMCNC: 33 G/DL (ref 31–36)
MCV RBC AUTO: 100 FL (ref 80–100)
MONOCYTES ABSOLUTE: 0.6 K/UL (ref 0–1.3)
MONOCYTES RELATIVE PERCENT: 5.4 %
NEUTROPHILS ABSOLUTE: 9 K/UL (ref 1.7–7.7)
NEUTROPHILS RELATIVE PERCENT: 81.2 %
PDW BLD-RTO: 14.6 % (ref 12.4–15.4)
PLATELET # BLD: 182 K/UL (ref 135–450)
PMV BLD AUTO: 8.4 FL (ref 5–10.5)
POTASSIUM SERPL-SCNC: 4.4 MMOL/L (ref 3.5–5.1)
PROTHROMBIN TIME: 27.5 SEC (ref 9.9–12.7)
RBC # BLD: 4 M/UL (ref 4–5.2)
SODIUM BLD-SCNC: 137 MMOL/L (ref 136–145)
WBC # BLD: 11.1 K/UL (ref 4–11)

## 2021-12-13 PROCEDURE — 2580000003 HC RX 258: Performed by: INTERNAL MEDICINE

## 2021-12-13 PROCEDURE — 51798 US URINE CAPACITY MEASURE: CPT

## 2021-12-13 PROCEDURE — 6370000000 HC RX 637 (ALT 250 FOR IP): Performed by: INTERNAL MEDICINE

## 2021-12-13 PROCEDURE — 97166 OT EVAL MOD COMPLEX 45 MIN: CPT

## 2021-12-13 PROCEDURE — 6360000002 HC RX W HCPCS: Performed by: INTERNAL MEDICINE

## 2021-12-13 PROCEDURE — G0378 HOSPITAL OBSERVATION PER HR: HCPCS

## 2021-12-13 PROCEDURE — 36415 COLL VENOUS BLD VENIPUNCTURE: CPT

## 2021-12-13 PROCEDURE — 97162 PT EVAL MOD COMPLEX 30 MIN: CPT

## 2021-12-13 PROCEDURE — 6370000000 HC RX 637 (ALT 250 FOR IP): Performed by: NURSE PRACTITIONER

## 2021-12-13 PROCEDURE — 97530 THERAPEUTIC ACTIVITIES: CPT

## 2021-12-13 PROCEDURE — 96376 TX/PRO/DX INJ SAME DRUG ADON: CPT

## 2021-12-13 PROCEDURE — 1200000000 HC SEMI PRIVATE

## 2021-12-13 PROCEDURE — 80048 BASIC METABOLIC PNL TOTAL CA: CPT

## 2021-12-13 PROCEDURE — 85025 COMPLETE CBC W/AUTO DIFF WBC: CPT

## 2021-12-13 PROCEDURE — APPNB45 APP NON BILLABLE 31-45 MINUTES: Performed by: NURSE PRACTITIONER

## 2021-12-13 PROCEDURE — 73120 X-RAY EXAM OF HAND: CPT

## 2021-12-13 PROCEDURE — 97535 SELF CARE MNGMENT TRAINING: CPT

## 2021-12-13 PROCEDURE — 85610 PROTHROMBIN TIME: CPT

## 2021-12-13 RX ORDER — BETHANECHOL CHLORIDE 10 MG/1
10 TABLET ORAL 3 TIMES DAILY
Status: DISCONTINUED | OUTPATIENT
Start: 2021-12-13 | End: 2021-12-15 | Stop reason: HOSPADM

## 2021-12-13 RX ORDER — TRAMADOL HYDROCHLORIDE 50 MG/1
50 TABLET ORAL EVERY 4 HOURS PRN
Status: DISCONTINUED | OUTPATIENT
Start: 2021-12-13 | End: 2021-12-15 | Stop reason: HOSPADM

## 2021-12-13 RX ORDER — ACETAMINOPHEN 325 MG/1
650 TABLET ORAL 3 TIMES DAILY
Status: DISCONTINUED | OUTPATIENT
Start: 2021-12-13 | End: 2021-12-15 | Stop reason: HOSPADM

## 2021-12-13 RX ADMIN — TRAMADOL HYDROCHLORIDE 50 MG: 50 TABLET, FILM COATED ORAL at 20:55

## 2021-12-13 RX ADMIN — BETHANECHOL CHLORIDE 10 MG: 10 TABLET ORAL at 20:55

## 2021-12-13 RX ADMIN — WARFARIN SODIUM 3 MG: 2 TABLET ORAL at 15:53

## 2021-12-13 RX ADMIN — TRAMADOL HYDROCHLORIDE 50 MG: 50 TABLET, FILM COATED ORAL at 10:29

## 2021-12-13 RX ADMIN — ACETAMINOPHEN 650 MG: 325 TABLET ORAL at 08:50

## 2021-12-13 RX ADMIN — LISINOPRIL 2.5 MG: 5 TABLET ORAL at 12:40

## 2021-12-13 RX ADMIN — SODIUM CHLORIDE: 9 INJECTION, SOLUTION INTRAVENOUS at 09:03

## 2021-12-13 RX ADMIN — ACETAMINOPHEN 650 MG: 325 TABLET ORAL at 20:55

## 2021-12-13 RX ADMIN — HYDROMORPHONE HYDROCHLORIDE 0.5 MG: 1 INJECTION, SOLUTION INTRAMUSCULAR; INTRAVENOUS; SUBCUTANEOUS at 06:17

## 2021-12-13 RX ADMIN — BETHANECHOL CHLORIDE 10 MG: 10 TABLET ORAL at 15:53

## 2021-12-13 RX ADMIN — METOPROLOL SUCCINATE 25 MG: 25 TABLET, EXTENDED RELEASE ORAL at 08:50

## 2021-12-13 RX ADMIN — ACETAMINOPHEN 650 MG: 325 TABLET ORAL at 15:56

## 2021-12-13 RX ADMIN — OXYCODONE HYDROCHLORIDE AND ACETAMINOPHEN 500 MG: 500 TABLET ORAL at 21:53

## 2021-12-13 RX ADMIN — BETHANECHOL CHLORIDE 10 MG: 10 TABLET ORAL at 12:40

## 2021-12-13 ASSESSMENT — PAIN DESCRIPTION - DESCRIPTORS
DESCRIPTORS: ITCHING;ACHING
DESCRIPTORS: ACHING
DESCRIPTORS: ACHING

## 2021-12-13 ASSESSMENT — PAIN DESCRIPTION - ORIENTATION
ORIENTATION: LEFT

## 2021-12-13 ASSESSMENT — PAIN DESCRIPTION - PAIN TYPE
TYPE: ACUTE PAIN

## 2021-12-13 ASSESSMENT — PAIN SCALES - GENERAL
PAINLEVEL_OUTOF10: 8
PAINLEVEL_OUTOF10: 6
PAINLEVEL_OUTOF10: 8
PAINLEVEL_OUTOF10: 10
PAINLEVEL_OUTOF10: 8
PAINLEVEL_OUTOF10: 8
PAINLEVEL_OUTOF10: 5
PAINLEVEL_OUTOF10: 6
PAINLEVEL_OUTOF10: 4

## 2021-12-13 ASSESSMENT — PAIN DESCRIPTION - FREQUENCY: FREQUENCY: CONTINUOUS

## 2021-12-13 ASSESSMENT — PAIN DESCRIPTION - LOCATION
LOCATION: SHOULDER
LOCATION: HAND

## 2021-12-13 NOTE — PROGRESS NOTES
Patient refused offers of pain meds during assessment, stated only hurt when moved L arm. Patient alert to self, somewhat to situation. Pleasant. Repositioning as tolerated. The care plan and education has been reviewed and mutually agreed upon with the patient. Patient remains free from falls. All fall precautions in place. Yellow blanket at bedside, yellow bracelet on patient. SAFE sign on door. Bed and chair alarms being used. Bed in lowest position. Will monitor.      Electronically signed by Raissa Segura RN on 12/13/2021 at 1:16 AM

## 2021-12-13 NOTE — PROGRESS NOTES
Physical Therapy    Facility/Department: [address] ORTHO/NEURO NURSING  Initial Assessment    NAME: Deidra Bosworth  : 3/2/1928  MRN: [id number]    Date of Service: 2021    Discharge Recommendations:Elsie Martel scored a  on the AM-PAC short mobility form. Current research shows that an AM-PAC score of 17 or less is typically not associated with a discharge to the patient's home setting. Based on the patient's AM-PAC score and their current functional mobility deficits, it is recommended that the patient have 3-5 sessions per week of Physical Therapy at d/c to increase the patient's independence. Please see assessment section for further patient specific details. If patient discharges prior to next session this note will serve as a discharge summary. Please see below for the latest assessment towards goals. PT Equipment Recommendations  Equipment Needed: No (TBD at next level of care)  Other: Pt would possibly need hemiwalker vs quad cane pending progress; pt only has RW at home and is currently NWB through LUE    Assessment   Body structures, Functions, Activity limitations: Decreased functional mobility ; Decreased ADL status; Decreased ROM; Decreased strength; Decreased cognition; Decreased endurance; Decreased balance; Increased pain; Decreased posture  Assessment: Pt presents as below her baseline function and would benefit from skilled PT services to promote safe return to PLOF. At this time, pt requiring assistance for all aspects of mobility, including bed mobility secondary to significant LUE pain and NWB status of LUE. Prognosis: Good  Decision Making: Medium Complexity  PT Education: PT Role; Plan of Care; Goals  Patient Education: D/C recommendations--pt verbalized understanding, may benefit from reinforcement  Barriers to Learning: Cognition  REQUIRES PT FOLLOW UP: Yes  Activity Tolerance  Activity Tolerance: Patient limited by pain;  Patient limited by cognitive Assessment: 0-10  Pain Level: 8 (RN notified, providing pain medication during session)  Vital Signs  Patient Currently in Pain: Yes       Orientation  Orientation  Overall Orientation Status: Impaired  Orientation Level: Oriented to person; Oriented to situation; Disoriented to time; Oriented to place  Social/Functional History  Social/Functional History  Lives With: Daughter  Type of Home: House  Home Layout: One level  Home Access: Stairs to enter with rails  Entrance Stairs - Number of Steps: Pt unsure; things rail on B sides; about 3 BARBARA  Entrance Stairs - Rails: Both  Bathroom Shower/Tub: Tub/Shower unit  Bathroom Toilet: Standard  Bathroom Equipment: Shower chair (May have a grab bar, unsure)  Bathroom Accessibility: Walker accessible  Home Equipment: 4 wheeled walker  ADL Assistance: Needs assistance (Max A shower)  Homemaking Responsibilities: No (daughter performs)  Ambulation Assistance: Independent (w/4WW)  Transfer Assistance: Needs assistance (gets help getting onto shower chair)  Leisure & Hobbies: Watches birds  Additional Comments: Pt questionable historian. 1 fall PTA, reports no other recent falls. Cognition        Objective     Observation/Palpation  Posture: Fair    AROM RLE (degrees)  RLE AROM: WFL  AROM LLE (degrees)  LLE AROM : WFL  Strength RLE  Strength RLE: WFL  Strength LLE  Strength LLE: WFL  Tone RLE  RLE Tone: Normotonic  Tone LLE  LLE Tone: Normotonic  Motor Control  Gross Motor?: WFL  Sensation  Overall Sensation Status: WFL  Bed mobility  Supine to Sit: Moderate assistance (HOB elevated)  Scooting: Moderate assistance  Transfers  Sit to Stand: Moderate Assistance  Stand to sit: Minimal Assistance  Stand Pivot Transfers:  Moderate Assistance  Ambulation  Ambulation?: No  Stairs/Curb  Stairs?: No     Balance  Posture: Fair  Sitting - Static: Good  Sitting - Dynamic: Good  Standing - Static: Fair; +  Standing - Dynamic: [address]  Times per week: 7x  Times per day: Daily  Current Treatment Recommendations: Strengthening, ROM, Balance Training, Functional Mobility Training, Transfer Training, Endurance Training, Gait Training, Stair training, Home Exercise Program, Safety Education & Training  Safety Devices  Type of devices: All fall risk precautions in place, Call light within reach, Chair alarm in place, Gait belt, Patient at risk for falls, Left in chair, Telesitter in use, Nurse notified  Restraints  Initially in place: No    G-Code       OutComes Score                                                  AM-PAC Score  AM-PAC Inpatient Mobility Raw Score : 11 (12/13/21 1134)  AM-PAC Inpatient T-Scale Score : 33.86 (12/13/21 1134)  Mobility Inpatient CMS 0-100% Score: 72.57 (12/13/21 1134)  Mobility Inpatient CMS G-Code Modifier : CL (12/13/21 1134)          Goals  Short term goals  Time Frame for Short term goals:  To be met prior to discharge  Short term goal 1: Pt will complete bed mobility with CGA  Short term goal 2: Pt will complete sit to/from stand with min A  Short term goal 3: Pt will transfer bed to/from chair with min A  Short term goal 4: Pt will ambulate 10 ft with LRAD and min A       Therapy Time   Individual Concurrent Group Co-treatment   Time In 0958         Time Out 1101         Minutes 63         Timed Code Treatment Minutes: [address] [facility] [place] [place], [id number]  Tito Duque, PT

## 2021-12-13 NOTE — PROGRESS NOTES
Occupational Therapy   Occupational Therapy Initial Assessment  Date: 2021   Patient Name: Piter Soto  MRN: [id number]     : 3/2/1928    Date of Service: 2021    Discharge Recommendations:  Piter Soto scored a  on the AM-PAC ADL Inpatient form. Current research shows that an AM-PAC score of 17 or less is typically not associated with a discharge to the patient's home setting. Based on the patient's AM-PAC score and their current ADL deficits, it is recommended that the patient have 3-5 sessions per week of Occupational Therapy at d/c to increase the patient's independence. Please see assessment section for further patient specific details. If patient discharges prior to next session this note will serve as a discharge summary. Please see below for the latest assessment towards goals. OT Equipment Recommendations  Equipment Needed: No (TBD next level of care)    Assessment   Performance deficits / Impairments: Decreased functional mobility ; Decreased ADL status; Decreased endurance; Decreased coordination; Decreased ROM; Decreased balance; Decreased cognition  Assessment: Pt is below her baseline level of occupational function, based on the above deficits associated with UTI, L left shoulder proximal humerus fracture. Pt would benefit from continued skilled acute OT services to address these deficits. Treatment Diagnosis: Decreased ADL status, functional mobility, endurance, coordination, ADL status, ROM, balance, and cognition associated with UTI, UTI, L left shoulder proximal humerus fracture  Prognosis: Good  Decision Making: Medium Complexity  History: Pt lives w/dtr, gets assist for ADLs, ambulates w/4WW, had dementia  Exam: As above  Assistance / Modification: Mod A supine to sit; Mod A of 1 & CGA of 1 transfer, Mod A UB bathing  OT Education: OT Role; Plan of Care; Transfer Training; Orientation  Patient Education: D/C recommendation.  Pt needs reinforcement of learning. Barriers to Learning: Cognition, physical  REQUIRES OT FOLLOW UP: Yes  Activity Tolerance  Activity Tolerance: Patient Tolerated treatment well; Patient limited by pain  Safety Devices  Safety Devices in place: Yes  Type of devices: All fall risk precautions in place; Chair alarm in place; Gait belt; Patient at risk for falls; Left in chair; Nurse notified; Call light within reach           Patient Diagnosis(es): The primary encounter diagnosis was Closed fracture of proximal end of left humerus, unspecified fracture morphology, initial encounter. Diagnoses of Fall, initial encounter, Chronic anticoagulation, and FTT (failure to thrive) in adult were also pertinent to this visit. has a past medical history of Anemia, CAD (coronary artery disease), Glaucoma, High blood pressure, and Myocardial infarction ([facility] [address] has a past surgical history that includes Pacemaker insertion; Appendectomy; and Hip Arthroplasty (Right, 05/25/2017). Treatment Diagnosis: Decreased ADL status, functional mobility, endurance, coordination, ADL status, ROM, balance, and cognition associated with UTI, UTI, L left shoulder proximal humerus fracture      Restrictions  Restrictions/Precautions  Restrictions/Precautions: Weight Bearing, Fall Risk (high fall risk)  Required Braces or Orthoses?: Yes  Upper Extremity Weight Bearing Restrictions  Left Upper Extremity Weight Bearing: Non Weight Bearing  Other: OK light ROM to L elbow, wrist, and hand  Required Braces or Orthoses  Left Upper Extremity Brace/Splint: Sling (hand splint)  Position Activity Restriction  Other position/activity restrictions: Ms. Lindalee Skiff is being seen for evaluation of of the left shoulder. The patient is a 77-year-old female who reportedly lost her balance while going to the bathroom. The patient does ambulate with a walker. She has numerous contractures. She does live with her daughter. She is right-hand dominant.   She reports severe left shoulder pain. Orthopedics is being consulted for the left shoulder issues. Subjective   General  Chart Reviewed: Yes  Family / Caregiver Present: No  Diagnosis: UTI, UTI, L left shoulder proximal humerus fracture  Subjective  Subjective: Pt supine in bed, agreeable to OT eval. Pt reports 8/10 pain in L hand. Pt with splint on L hand  Patient Currently in Pain: Yes  Pain Assessment  Pain Assessment: 0-10  Pain Level: 8 (RN notified, providing pain medication during session)  Pain Type: Acute pain  Pain Location: Hand  Pain Orientation: Left  Pre Treatment Pain Screening  Intervention List: Patient able to continue with treatment; Nurse called to administer meds  Vital Signs  Patient Currently in Pain: Yes  Social/Functional History  Social/Functional History  Lives With: Daughter  Type of Home: House  Home Layout: One level  Home Access: Stairs to enter with rails  Entrance Stairs - Number of Steps: Pt unsure; things rail on B sides; about 3 BARBARA  Entrance Stairs - Rails: Both  Bathroom Shower/Tub: Tub/Shower unit  Bathroom Toilet: Standard  Bathroom Equipment: Shower chair (May have a grab bar, unsure)  Bathroom Accessibility: Walker accessible  Home Equipment: 4 wheeled walker  ADL Assistance: Needs assistance (Max A shower)  Homemaking Responsibilities: No (daughter performs)  Ambulation Assistance: Independent (w/4WW)  Transfer Assistance: Needs assistance (gets help getting onto shower chair)  Leisure & Hobbies: Watches birds  Additional Comments: Pt questionable historian. 1 fall PTA, reports no other recent falls.        Objective   Vision: Impaired  Vision Exceptions: Wears glasses for reading  Hearing: Within functional limits    Orientation  Overall Orientation Status: Impaired  Orientation Level: Disoriented to time; Oriented to person; Oriented to situation; Oriented to place  Observation/Palpation  Posture: Fair  Balance  Sitting Balance: Stand by assistance  Standing Balance: Dependent/Total (Mod A of 1 & CGA of 1)  Standing Balance  Time: ~10 sec  Activity: transfer EOB to recliner  ADL  Feeding: Setup  Grooming: Minimal assistance (brush teeth, s/u wash face)  UE Bathing: Moderate assistance (assist for UEs; limited by L UE pain)  UE Dressing: Dependent/Total (gown, splint)  LE Dressing: Dependent/Total  Additional Comments: Removed & replaced Purewick  Coordination  Coordination and Movement description: Fine motor impairments; Left UE; Right UE (d/t contractures; pt uses R thumb and index finger to grasp cup, toothbrush, utensil)     Bed mobility  Supine to Sit: Moderate assistance (HOB elevated)  Scooting: Moderate assistance  Transfers  Stand Pivot Transfers: 2 Person assistance; Dependent/Total (Mod A of 1 & CGA of 1 EOB to recliner, no AD)  Sit to stand: Moderate assistance  Stand to sit: 2 Person assistance; Dependent/Total (Mod A of 1 & CGA of 1)     Cognition  Overall Cognitive Status: Exceptions  Arousal/Alertness: Appropriate responses to stimuli (extra time to respond to questions)  Following Commands:  Follows one step commands with increased time  Attention Span: Appears intact  Memory: Decreased recall of biographical Information  Problem Solving: Assistance required to generate solutions; Assistance required to implement solutions  Initiation: Does not require cues  Sequencing: Requires cues for some  Perception  Overall Perceptual Status: WFL     Sensation  Overall Sensation Status: WFL        LUE PROM (degrees)  LUE PROM: Exceptions  LUE General PROM: UE in sling, elbow ext WFL, elbow flexion ~40 degrees, slight wrist flex/ext  Left Hand AROM (degrees)  Left Hand AROM: Exceptions  Left Hand General AROM: hand splint; 3rd finger contracted  RUE AROM (degrees)  RUE AROM : WFL  Right Hand AROM (degrees)  Right Hand AROM: Exceptions  Right Hand General AROM: thumb and index finger WFL, 3-5th digit contracted in flexion  LUE Strength  Gross LUE Strength:  (NT; in sling)  L Hand General: NT  RUE Strength  Gross RUE Strength: WFL (elbow grossly 4/5)  R Hand General: 4/5 (grossly, thumb & forefinger only)                   Plan   Plan  Times per week: 7  Times per day: Daily  Current Treatment Recommendations: Self-Care / ADL, Endurance Training, Functional Mobility Training, Cognitive Reorientation, Balance Training    AM-PAC Score        AM-[facility] Inpatient Daily Activity Raw Score: 12 (12/13/21 1131)  AM-PAC Inpatient ADL T-Scale Score : 30.6 (12/13/21 1131)  ADL Inpatient CMS 0-100% Score: 66.57 (12/13/21 1131)  ADL Inpatient CMS G-Code Modifier : CL (12/13/21 1131)    Goals  Short term goals  Time Frame for Short term goals: Discharge  Short term goal 1: Min A for bed mobility  Short term goal 2: Min A for functional transfers to ADL surfaces w/AAD  Short term goal 3: Min A for UB bathing, Mod A for UB dressing using adaptive techniques  Short term goal 4: Mod A for LB bathing/dressing  Short term goal 5: S/U for grooming       Therapy Time   Individual Concurrent Group Co-treatment   Time In 0958         Time Out 1101         Minutes 63               Timed Code Treatment Minutes:   48 min    Total Treatment Minutes:  61 min    C/ [address], OT   Beryl Shepherdoms., OTR/L, [id number]

## 2021-12-13 NOTE — PROGRESS NOTES
Patient Active Problem List   Diagnosis    HTN (hypertension)    Hyperkalemia    Knee pain    Atrial fibrillation with RVR ([facility] [address]    5/25/17 RIGHT hip hemiarthroplasty    Dementia (HCC)    GERD (gastroesophageal reflux disease)    Chronic atrial fibrillation (HCC)    Cardiomyopathy, ischemic    Chronic anticoagulation    Generalized weakness    Hypertensive urgency    Closed fracture of proximal end of left humerus   H&P dictated

## 2021-12-13 NOTE — PROGRESS NOTES
Shift assessment complete. Medications given per mar. Held some meds. Waiting on ortho about surgery or not. Bladder scanned pt, holding 558. Messaged physician. Started bethanechol and teague. All questions answered. Safety precautions in place.        Nidhi Blankenship RN

## 2021-12-13 NOTE — PROGRESS NOTES
[facility] Orthopedic Surgery   Progress Note    CHIEF COMPLAINT/DIAGNOSIS: LEFT proximal humerus fracture (12/11/21)    SUBJECTIVE: The patient is sitting up in bed. She has contractures to her bilateral hands. She has a brace in the left hand. She is wearing a sling on the left side. Appears comfortable at baseline. She is reportedly right-hand-dominant. OBJECTIVE  Physical    VITALS:  BP (!) 145/82   Pulse 70   Temp 94.9 °F (34.9 °C) (Axillary)   Resp 16   Ht 5' (1.524 m)   Wt 110 lb (49.9 kg)   SpO2 95%   BMI 21.48 kg/m²     GENERAL: Alert and oriented x3, in no acute distress. MUSCULOSKELETAL: Able to flex and extend the wrist without issue. ROM: left shoulder ROM deferred. ROM of operative elbow is limited secondary to pain and swelling. Bilateral hand contractures noted. No swelling or ecchymosis noted. Inability to perform ROM. Sensory:  SILT in axillary, PIN, IO, radial, ulnar distributions. Vascular:   2+ radial pulses with brisk cap refill. Data    ALL MEDICATIONS HAVE BEEN REVIEWED    CBC: Recent Labs     12/12/21  0648 12/13/21  0744   WBC 6.7 11.1*   HGB 15.8 13.2   HCT 47.1 40.0    182     BMP:   Recent Labs     12/12/21  0753 12/13/21  0744    137   K 4.3 4.4    103   CO2 21 20*   BUN 18 17   CREATININE 0.7 0.6     INR:   Recent Labs     12/12/21  0753 12/13/21  0744   INR 2.10* 2.35*       ASSESSMENT:  Left proximal humerus fracture (12/11/2020)  Bilateral hand contractures/pain possibly 2/2 fall  Chronic warfarin therapy for chronic A. Fib  HTN   GERD  Dementia    PLAN:   X-rays ordered of bilateral hands as nursing reports that she has been hesitant to use these and is complaining of pain. It appears she has bilateral chronic hand contractures. - WB status:  NO weight bearing through left arm; Continue sling.   - DVT prophylaxis: Warfarin per primary team  - PT/OT  - Pain Control: Add scheduled Tylenol and tramadol as needed.     - Dispo: per primary team; ok to d/c from our end after hand x rays    Follow-up with Dr. Tanisha Siddiqi in 2 weeks for repeat films. Office # [phone]  No future appointments. Ruthy Fleischer, APRN - CNP  12/13/2021  11:24 AM      Addendum: 9493. Left impacted distal radius fracture noted on plain films. Recommend non-operative treatment. Will place removable velcro splint. If unable to place due to contractures then will make custom molded splint tomorrow at bedside.    Continue MILES RAMIREZ

## 2021-12-13 NOTE — H&P
[place] [address]                     [facility], [address]                              HISTORY AND PHYSICAL    PATIENT NAME: Mare Whitman                  :        1928  MED REC NO:   [id number]                          ROOM:       4468  ACCOUNT NO:   [de-identified]                           ADMIT DATE: 2021  PROVIDER:     Nabil Loza MD    HISTORY OF PRESENT ILLNESS:  The patient is a 80-year-old white American  lady who came to the emergency room, has severe dementia, has history of  fall. The patient is a poor historian and unable to give any history  precisely. The patient was brought here following a fall. The patient  is not following any command. She is totally disoriented and  incoherent. Complains of pain in the shoulder region. There is no  chest pain. No shortness of breath. The patient appears to be moaning  and groaning though. The patient had a mechanical fall and injury while  attempting to go to the bathroom, has history of hemiarthroplasty of the  right hip with positive severe shoulder tenderness and possible  deformity. There is no chest pain. No shortness of breath. PAST MEDICAL HISTORY:  Pertinent for senile dementia, anemia,  atherosclerotic heart disease, glaucoma, high blood pressure, myocardial  infarction. PAST SURGICAL HISTORY:  Pertinent for hip arthroplasty, pacemaker  insertion, appendectomy. FAMILY HISTORY:  Both her parents are  because of natural  causes. MEDICATIONS:  The patient is on vitamin C, ceftriaxone, Apresoline,  ketamine, lisinopril, metoprolol succinate, warfarin. ALLERGIES:  The patient is allergic to PENICILLIN. SOCIAL HISTORY:  The patient is a . She does have a daughter. There is no history of smoking, no history of drinking. She used to  work as an . No history of substance abuse.     FAMILY HISTORY:  Both her parents are  because of natural  causes. REVIEW OF SYSTEMS:  Negative for loss of consciousness. Does have  unsteadiness. The patient is prone to falls. No visual blurring. No  convulsions. No angina pectoris. Does have exertional shortness of  breath. No orthopnea. No paroxysmal nocturnal dyspnea. No abdominal  pain. No hematemesis. No melena. The patient does have left-sided  shoulder pain. There is no symptom suggestive of any paralysis. The  patient is totally incapable of following any commands. The patient is  incontinent. PHYSICAL EXAMINATION:  GENERAL:  Alert, awake, oriented times zero. Moderately distressed,  severely demented elderly white woman looking consistent with her stated  age. VITAL SIGNS:  Her temperature is 97.4. Blood pressure 182/106, it was  as high as 200/118. Respirations are 24, heart rate 70, O2 sat 95% on  room air. HEENT:  Oral mucosa dry. SKIN:  Warm and dry. NECK:  Supple. Faint carotid bruit. No jugular venous distention. LUNGS:  Vesicular breath sounds. Fairly clear to auscultation. HEART:  Regular rate and rhythm. S1 and S2 without any S3 or S4 gallop. ABDOMEN:  Soft, nontender. Bowel sounds present. EXTREMITIES:  Show tenderness in the left humeral region with restricted  range of motion and obvious deformity without any distal neurovascular  deficit. NEUROLOGIC:  The patient appears grossly intact. LABORATORY DATA:  Lab evaluation shows sodium 138, potassium 4.3,  chloride 105, CO2 of 21, BUN 18, creatinine 0.7, anion gap is 12. Calcium is 9.5, blood glucose is 133. White blood cell count 6.7,  hemoglobin/hematocrit 15.8 and 47.1, platelet count 915. PT/INR is 24.5  and 2.10. Microbiology report shows COVID-19 has not been checked. Urinalysis has not been checked. IMAGING DATA:  X-ray of the left shoulder shows comminuted fracture of  the left humeral head and neck, inferior subluxation of the humeral head  in relation to the glenoid. Assessment is fracture subluxation of the  left shoulder. A transthoracic echocardiogram even 5 years ago was  consistent with LVEF of 25%, my suspicion is it is much worse now. Chest x-ray, no radiographic evidence of acute pulmonary edema. ASSESSMENT:  Fracture of the left proximal humerus with dislocation,  senile dementia, hypertension, urgency, paroxysmal atrial fibrillation,  acute on chronic systolic heart failure, dementia, gastroesophageal  reflux disease, chronic atrial fibrillation, ischemic cardiomyopathy,  chronic anticoagulation. PLAN:  1. Get her admitted. 2.  Treat her with IV hydration. 3.  PT, OT evaluation. 4.  Orthopedic surgery consultation, although I do not believe she is a  surgical candidate. 5.  Provide pain relief, PT, OT and parenteral antihypertensive control. 6.  Continuation of anticoagulation.         Arelis Garcia MD    D: 12/12/2021 23:01:53       T: 12/12/2021 23:05:51     [email or web address]  Job#: [id number]     Doc#: [id number]    CC:

## 2021-12-13 NOTE — PROGRESS NOTES
Progress Note - Dr. Kulwant Arreguin - Internal Medicine  PCP: Molly Romero MD [facility] [address] [address] [address] Day: 1  Code Status: Prior  Current Diet: ADULT DIET; Regular        CC: follow up on medical issues    Subjective:   Farooq Torres is a 80 y.o. female. Pt seen and examined  Chart reviewed since last visit, labs and imaging below        Doing ok this am  Still pain to shoulder, but manageable, states it is 4/10  No other issues  Awaiting pt/ot eval    She denies chest pain, denies shortness of breath, denies nausea,  denies emesis. 10 system Review of Systems is reviewed with patient, and pertinent positives are noted in HPI above . Otherwise, Review of systems is negative. I have reviewed the patient's medical and social history in detail and updated the computerized patient record. To recap: She  has a past medical history of Anemia, CAD (coronary artery disease), Glaucoma, High blood pressure, and Myocardial infarction ([facility] [address] . She  has a past surgical history that includes Pacemaker insertion; Appendectomy; and Hip Arthroplasty (Right, 05/25/2017). . She  reports that she has never smoked. She does not have any smokeless tobacco history on file. She reports current alcohol use. She reports that she does not use drugs. .        Active Hospital Problems    Diagnosis Date Noted    Hypertensive urgency [I16.0] 12/12/2021    Closed fracture of proximal end of left humerus [S42.202A] 12/12/2021    Chronic atrial fibrillation ([facility] [address] [I48.20] 05/25/2017    Cardiomyopathy, ischemic [I25.5] 05/25/2017    Chronic anticoagulation [Z79.01]     GERD (gastroesophageal reflux disease) [K21.9] 05/24/2017    Dementia ([facility] [address] [F03.90] 05/24/2017    Atrial fibrillation with RVR (HCC) [I48.91]     HTN (hypertension) [I10] 10/21/2016    Knee pain [M25.569] 10/21/2016       Current Facility-Administered Medications: 0.9 % sodium chloride infusion, , IntraVENous, Continuous  HYDROmorphone HCl PF (DILAUDID) injection 0.5 mg, 0.5 mg, IntraVENous, Q4H PRN  ondansetron (ZOFRAN) injection 4 mg, 4 mg, IntraVENous, Q6H PRN  acetaminophen (TYLENOL) tablet 650 mg, 650 mg, Oral, Q4H PRN  ascorbic acid (VITAMIN C) tablet 500 mg, 500 mg, Oral, Dinner  lisinopril (PRINIVIL;ZESTRIL) tablet 2.5 mg, 2.5 mg, Oral, Daily  metoprolol succinate (TOPROL XL) extended release tablet 25 mg, 25 mg, Oral, Daily  nitroGLYCERIN (NITROSTAT) SL tablet 0.4 mg, 0.4 mg, SubLINGual, Q5 Min PRN  warfarin (COUMADIN) tablet 3 mg, 3 mg, Oral, Daily  hydrALAZINE (APRESOLINE) injection 10 mg, 10 mg, IntraVENous, Q6H PRN  0.9 % sodium chloride bolus, 500 mL, IntraVENous, PRN  potassium chloride (KLOR-CON M) extended release tablet 40 mEq, 40 mEq, Oral, PRN **OR** potassium bicarb-citric acid (EFFER-K) effervescent tablet 40 mEq, 40 mEq, Oral, PRN **OR** potassium chloride 10 mEq/100 mL IVPB (Peripheral Line), 10 mEq, IntraVENous, PRN         Objective:  BP (!) 145/82   Pulse 70   Temp 94.9 °F (34.9 °C) (Axillary)   Resp 16   Ht 5' (1.524 m)   Wt 110 lb (49.9 kg)   SpO2 95%   BMI 21.48 kg/m²      Patient Vitals for the past 24 hrs:   BP Temp Temp src Pulse Resp SpO2 Height Weight   12/13/21 0815 (!) 145/82 94.9 °F (34.9 °C) Axillary 70 16 95 % -- --   12/13/21 0617 126/70 97.7 °F (36.5 °C) Axillary 71 16 97 % -- --   12/13/21 0014 126/70 97.7 °F (36.5 °C) Axillary 70 18 98 % -- --   12/12/21 2130 (!) 192/99 97.3 °F (36.3 °C) Temporal 70 18 99 % -- --   12/12/21 1500 109/73 -- -- 70 19 -- -- --   12/12/21 1442 (!) 146/89 -- -- 70 -- -- 5' (1.524 m) 110 lb (49.9 kg)   12/12/21 1428 (!) 146/89 -- -- 80 -- -- -- --   12/12/21 1400 (!) 182/106 97.4 °F (36.3 °C) -- 70 -- 98 % -- --   12/12/21 1301 (!) 171/95 -- -- 70 (!) 33 93 % -- --   12/12/21 1230 128/80 -- -- 70 24 99 % -- --   12/12/21 0930 (!) 168/95 -- -- 70 18 100 % -- --   12/12/21 0900 (!) 183/101 -- -- 70 14 100 % -- --   12/12/21 0833 (!) 200/118 -- -- 70 22 100 % -- --     Patient Vitals for the past 96 hrs (Last 3 readings):   Weight   12/12/21 1442 110 lb (49.9 kg)   12/12/21 0655 115 lb (52.2 kg)         No intake or output data in the 24 hours ending 12/13/21 0831      Physical Exam:   Vitals as above  General appearance: alert, appears stated age and cooperative    Head: Normocephalic, without obvious abnormality, atraumatic    Lungs: clear to auscultation bilaterally    Heart: regular rate and rhythm, S1, S2 normal, no murmur    Abdomen: soft, non-tender; bowel sounds normal; no masses, no organomegaly    Extremities: extremities normal, no cyanosis, no edema; arm in sling    Labs:  Lab Results   Component Value Date    WBC 11.1 (H) 12/13/2021    HGB 13.2 12/13/2021    HCT 40.0 12/13/2021     12/13/2021    ALT 16 10/07/2020    AST 28 10/07/2020     12/13/2021    K 4.4 12/13/2021     12/13/2021    CREATININE 0.6 12/13/2021    BUN 17 12/13/2021    CO2 20 (L) 12/13/2021    INR 2.35 (H) 12/13/2021    LABMICR YES 10/07/2020     Lab Results   Component Value Date    CKTOTAL 228 (H) 10/21/2016    TROPONINI <0.01 10/07/2020       Recent Imaging Results are Reviewed:  XR SHOULDER LEFT (MIN 2 VIEWS)    Result Date: 12/12/2021  EXAMINATION: THREE XRAY VIEWS OF THE LEFT SHOULDER 12/12/2021 7:20 am COMPARISON: None. HISTORY: ORDERING SYSTEM PROVIDED HISTORY: trauma, TECHNOLOGIST PROVIDED HISTORY: Reason for exam:->trauma, Reason for Exam: Fall FINDINGS: There is a comminuted fracture of the left humeral neck/head. There is inferior subluxation of the humeral head in relation to the glenoid. There is decreased bone mineralization. The acromioclavicular joint is intact. The visualized lung is clear. Comminuted fracture of the left humeral head/neck. Inferior subluxation of the humeral head in relation to the glenoid.      XR CHEST PORTABLE    Result Date: 12/12/2021  EXAMINATION: ONE XRAY VIEW OF THE CHEST 12/12/2021 7:20 am COMPARISON: Chest x-ray dated 10/07/2020. HISTORY: ORDERING SYSTEM PROVIDED HISTORY: trauma TECHNOLOGIST PROVIDED HISTORY: Reason for exam:->trauma Reason for Exam: Fall FINDINGS: LINES/TUBES/OTHER: Left subclavian pacemaker again noted. HEART/MEDIASTINUM: The cardiomediastinal silhouette is stable. PLEURA/LUNGS: There is elevation of the left hemithorax. Chronic interstitial markings again noted. There are no focal consolidations or pleural effusions. There is no appreciable pneumothorax. BONES/SOFT TISSUE: No acute abnormality. Old right rib fractures again noted. No radiographic evidence of acute pulmonary disease. XR HIP 2-3 VW W PELVIS LEFT    Result Date: 12/12/2021  EXAMINATION: ONE XRAY VIEW OF THE PELVIS AND TWO XRAY VIEWS LEFT HIP 12/12/2021 7:20 am COMPARISON: 08/29/2017 HISTORY: ORDERING SYSTEM PROVIDED HISTORY: trauma, TECHNOLOGIST PROVIDED HISTORY: Reason for exam:->trauma, Reason for Exam: Fall FINDINGS: There is no acute fracture or dislocation. The bones are demineralized. Status post right hip arthroplasty. There is mild left hip osteoarthritis. There are no bony destructive lesions. Degenerative changes involve the lumbar spine and bilateral sacroiliac joints. Vascular calcifications are noted. 1. No acute abnormality. Lab Results   Component Value Date    GLUCOSE 117 12/13/2021     No results found for: POCGLU  BP (!) 145/82   Pulse 70   Temp 94.9 °F (34.9 °C) (Axillary)   Resp 16   Ht 5' (1.524 m)   Wt 110 lb (49.9 kg)   SpO2 95%   BMI 21.48 kg/m²     Assessment and Plan: Active Problems:    HTN (hypertension) -Established problem. Stable. 145/82  Plan: see if improves with am meds. Continue narcotics as adequate pain control can assist with adequate BP control. Dementia ([address] -Established problem. Stable. .  Plan: Continue present orders/plan.      GERD (gastroesophageal reflux disease)  Plan: cont ppi    Cardiomyopathy, ischemic    Chronic anticoagulation    Closed fracture of proximal end of left humerus -Established problem. Stable.     Plan: continue conservative tx per ortho recs    Disp - await pt/ot eval. SNF may be necessary      (Please note that portions of this note were completed with a voice recognition program.  Efforts were made to edit the dictations but occasionally words are mis-transcribed.)        Jamia Mcdowell MD  12/13/2021

## 2021-12-13 NOTE — PLAN OF CARE
Problem: Skin Integrity:  Goal: Will show no infection signs and symptoms  Description: Will show no infection signs and symptoms  12/13/2021 1157 by Mayte Conner RN  Outcome: Ongoing  12/13/2021 0115 by Ang Jones RN  Outcome: Ongoing  Goal: Absence of new skin breakdown  Description: Absence of new skin breakdown  12/13/2021 1157 by Mayte Conner RN  Outcome: Ongoing  12/13/2021 0115 by Ang Jones RN  Outcome: Ongoing     Problem: Falls - Risk of:  Goal: Will remain free from falls  Description: Will remain free from falls  12/13/2021 1157 by Mayte Conner RN  Outcome: Ongoing  12/13/2021 0115 by Ang Jones RN  Outcome: Ongoing  Goal: Absence of physical injury  Description: Absence of physical injury  12/13/2021 1157 by Mayte Conner RN  Outcome: Ongoing  12/13/2021 0115 by Ang Jones RN  Outcome: Ongoing     Problem: Pain:  Goal: Pain level will decrease  Description: Pain level will decrease  12/13/2021 1157 by Mayte Conner RN  Outcome: Ongoing  12/13/2021 0115 by Ang Jones RN  Outcome: Ongoing  Goal: Control of acute pain  Description: Control of acute pain  12/13/2021 1157 by Mayte Conner RN  Outcome: Ongoing  12/13/2021 0115 by Ang Jones RN  Outcome: Ongoing  Goal: Control of chronic pain  Description: Control of chronic pain  12/13/2021 1157 by Mayte Conner RN  Outcome: Ongoing  12/13/2021 0115 by Ang Jones RN  Outcome: Ongoing     Problem: Skin Integrity:  Goal: Will show no infection signs and symptoms  Description: Will show no infection signs and symptoms  12/13/2021 1157 by Mayte Conner RN  Outcome: Ongoing  12/13/2021 0115 by Ang Jones RN  Outcome: Ongoing     Problem: Skin Integrity:  Goal: Absence of new skin breakdown  Description: Absence of new skin breakdown  12/13/2021 1157 by Mayte Conner RN  Outcome: Ongoing  12/13/2021 0115 by Ang Jones RN  Outcome: Ongoing     Problem: Falls - Risk of:  Goal: Will remain free from falls  Description: Will remain free from falls  12/13/2021 1157 by Daren Torres RN  Outcome: Ongoing  12/13/2021 0115 by Mike Riojas RN  Outcome: Ongoing     Problem: Falls - Risk of:  Goal: Absence of physical injury  Description: Absence of physical injury  12/13/2021 1157 by Daren Torres RN  Outcome: Ongoing  12/13/2021 0115 by Mike Riojas RN  Outcome: Ongoing     Problem: Pain:  Goal: Pain level will decrease  Description: Pain level will decrease  12/13/2021 1157 by Daren Torres RN  Outcome: Ongoing  12/13/2021 0115 by Mike Riojas RN  Outcome: Ongoing     Problem: Pain:  Goal: Control of acute pain  Description: Control of acute pain  12/13/2021 1157 by Daren Torres RN  Outcome: Ongoing  12/13/2021 0115 by Mike Riojas RN  Outcome: Ongoing     Problem: Pain:  Goal: Control of chronic pain  Description: Control of chronic pain  12/13/2021 1157 by Daren Torres RN  Outcome: Ongoing  12/13/2021 0115 by Mike Riojas RN  Outcome: Ongoing

## 2021-12-13 NOTE — CONSULTS
Clinical Pharmacy Note: Pharmacy to Dose Warfarin    Pharmacy consulted by Dr. Celestine Babinski to dose warfarin. Deidra Bosworth is a 80 y.o. female  is receiving warfarin for indication: aflutter. INR Goal Range: 2-3  Prior to admission warfarin dosing regimen: 3mg daily, managed by [facility] cardiology  INR today:   Lab Results   Component Value Date    INR 2.10 12/12/2021       Assessment/Plan:  INR is therapeutic on prior to admission dosing regimen. Ortho consult shows plans to manage non-surgically   Based on today's assessment, dose warfarin 3mg daily. Will begin tomorrow as med rec shows dose already taken today. Discussed with RN. Daily INR is ordered. Pharmacy will continue to monitor and make adjustments to regimen as necessary.      Thank you for the consult,     Trena Lieberman PharmD, BCPS

## 2021-12-14 VITALS
HEIGHT: 60 IN | WEIGHT: 110 LBS | SYSTOLIC BLOOD PRESSURE: 120 MMHG | BODY MASS INDEX: 21.6 KG/M2 | RESPIRATION RATE: 18 BRPM | HEART RATE: 70 BPM | DIASTOLIC BLOOD PRESSURE: 75 MMHG | OXYGEN SATURATION: 97 % | TEMPERATURE: 97.9 F

## 2021-12-14 LAB
ANION GAP SERPL CALCULATED.3IONS-SCNC: 9 MMOL/L (ref 3–16)
BASOPHILS ABSOLUTE: 0 K/UL (ref 0–0.2)
BASOPHILS RELATIVE PERCENT: 0.3 %
BUN BLDV-MCNC: 22 MG/DL (ref 7–20)
CALCIUM SERPL-MCNC: 9.2 MG/DL (ref 8.3–10.6)
CHLORIDE BLD-SCNC: 104 MMOL/L (ref 99–110)
CO2: 24 MMOL/L (ref 21–32)
CREAT SERPL-MCNC: 0.6 MG/DL (ref 0.6–1.2)
EOSINOPHILS ABSOLUTE: 0.1 K/UL (ref 0–0.6)
EOSINOPHILS RELATIVE PERCENT: 0.8 %
GFR AFRICAN AMERICAN: >60
GFR NON-AFRICAN AMERICAN: >60
GLUCOSE BLD-MCNC: 110 MG/DL (ref 70–99)
HCT VFR BLD CALC: 34.5 % (ref 36–48)
HEMOGLOBIN: 11.7 G/DL (ref 12–16)
LYMPHOCYTES ABSOLUTE: 1.8 K/UL (ref 1–5.1)
LYMPHOCYTES RELATIVE PERCENT: 28.2 %
MCH RBC QN AUTO: 34 PG (ref 26–34)
MCHC RBC AUTO-ENTMCNC: 33.9 G/DL (ref 31–36)
MCV RBC AUTO: 100.4 FL (ref 80–100)
MONOCYTES ABSOLUTE: 0.5 K/UL (ref 0–1.3)
MONOCYTES RELATIVE PERCENT: 8.3 %
NEUTROPHILS ABSOLUTE: 3.9 K/UL (ref 1.7–7.7)
NEUTROPHILS RELATIVE PERCENT: 62.4 %
PDW BLD-RTO: 14.5 % (ref 12.4–15.4)
PLATELET # BLD: 163 K/UL (ref 135–450)
PMV BLD AUTO: 8.1 FL (ref 5–10.5)
POTASSIUM SERPL-SCNC: 4.1 MMOL/L (ref 3.5–5.1)
RBC # BLD: 3.43 M/UL (ref 4–5.2)
SODIUM BLD-SCNC: 137 MMOL/L (ref 136–145)
WBC # BLD: 6.3 K/UL (ref 4–11)

## 2021-12-14 PROCEDURE — 97530 THERAPEUTIC ACTIVITIES: CPT

## 2021-12-14 PROCEDURE — 36415 COLL VENOUS BLD VENIPUNCTURE: CPT

## 2021-12-14 PROCEDURE — 85025 COMPLETE CBC W/AUTO DIFF WBC: CPT

## 2021-12-14 PROCEDURE — 97535 SELF CARE MNGMENT TRAINING: CPT

## 2021-12-14 PROCEDURE — APPNB45 APP NON BILLABLE 31-45 MINUTES: Performed by: NURSE PRACTITIONER

## 2021-12-14 PROCEDURE — 99024 POSTOP FOLLOW-UP VISIT: CPT | Performed by: NURSE PRACTITIONER

## 2021-12-14 PROCEDURE — 80048 BASIC METABOLIC PNL TOTAL CA: CPT

## 2021-12-14 PROCEDURE — 6370000000 HC RX 637 (ALT 250 FOR IP): Performed by: NURSE PRACTITIONER

## 2021-12-14 PROCEDURE — 6360000002 HC RX W HCPCS: Performed by: INTERNAL MEDICINE

## 2021-12-14 PROCEDURE — G0378 HOSPITAL OBSERVATION PER HR: HCPCS

## 2021-12-14 PROCEDURE — 6370000000 HC RX 637 (ALT 250 FOR IP): Performed by: INTERNAL MEDICINE

## 2021-12-14 PROCEDURE — 96376 TX/PRO/DX INJ SAME DRUG ADON: CPT

## 2021-12-14 PROCEDURE — 1200000000 HC SEMI PRIVATE

## 2021-12-14 RX ORDER — TRAMADOL HYDROCHLORIDE 50 MG/1
50 TABLET ORAL EVERY 4 HOURS PRN
Qty: 30 TABLET | Refills: 0 | Status: SHIPPED | OUTPATIENT
Start: 2021-12-14 | End: 2021-12-19

## 2021-12-14 RX ADMIN — BETHANECHOL CHLORIDE 10 MG: 10 TABLET ORAL at 08:29

## 2021-12-14 RX ADMIN — ACETAMINOPHEN 650 MG: 325 TABLET ORAL at 20:11

## 2021-12-14 RX ADMIN — LISINOPRIL 2.5 MG: 5 TABLET ORAL at 08:29

## 2021-12-14 RX ADMIN — TRAMADOL HYDROCHLORIDE 50 MG: 50 TABLET, FILM COATED ORAL at 20:10

## 2021-12-14 RX ADMIN — BETHANECHOL CHLORIDE 10 MG: 10 TABLET ORAL at 14:24

## 2021-12-14 RX ADMIN — WARFARIN SODIUM 3 MG: 2 TABLET ORAL at 08:28

## 2021-12-14 RX ADMIN — ACETAMINOPHEN 650 MG: 325 TABLET ORAL at 08:29

## 2021-12-14 RX ADMIN — BETHANECHOL CHLORIDE 10 MG: 10 TABLET ORAL at 20:11

## 2021-12-14 RX ADMIN — OXYCODONE HYDROCHLORIDE AND ACETAMINOPHEN 500 MG: 500 TABLET ORAL at 17:11

## 2021-12-14 RX ADMIN — TRAMADOL HYDROCHLORIDE 50 MG: 50 TABLET, FILM COATED ORAL at 01:26

## 2021-12-14 RX ADMIN — METOPROLOL SUCCINATE 25 MG: 25 TABLET, EXTENDED RELEASE ORAL at 08:29

## 2021-12-14 RX ADMIN — HYDROMORPHONE HYDROCHLORIDE 0.5 MG: 1 INJECTION, SOLUTION INTRAMUSCULAR; INTRAVENOUS; SUBCUTANEOUS at 12:05

## 2021-12-14 RX ADMIN — ACETAMINOPHEN 650 MG: 325 TABLET ORAL at 14:24

## 2021-12-14 ASSESSMENT — PAIN SCALES - GENERAL
PAINLEVEL_OUTOF10: 4
PAINLEVEL_OUTOF10: 7
PAINLEVEL_OUTOF10: 9
PAINLEVEL_OUTOF10: 7
PAINLEVEL_OUTOF10: 8

## 2021-12-14 ASSESSMENT — PAIN DESCRIPTION - ORIENTATION: ORIENTATION: LEFT

## 2021-12-14 ASSESSMENT — PAIN DESCRIPTION - DESCRIPTORS: DESCRIPTORS: ACHING

## 2021-12-14 ASSESSMENT — PAIN DESCRIPTION - PAIN TYPE: TYPE: ACUTE PAIN

## 2021-12-14 ASSESSMENT — PAIN - FUNCTIONAL ASSESSMENT: PAIN_FUNCTIONAL_ASSESSMENT: ACTIVITIES ARE NOT PREVENTED

## 2021-12-14 ASSESSMENT — PAIN DESCRIPTION - LOCATION: LOCATION: HAND

## 2021-12-14 ASSESSMENT — PAIN DESCRIPTION - ONSET: ONSET: ON-GOING

## 2021-12-14 ASSESSMENT — PAIN DESCRIPTION - FREQUENCY: FREQUENCY: CONTINUOUS

## 2021-12-14 NOTE — PROGRESS NOTES
[facility] Orthopedic Surgery   Progress Note    CHIEF COMPLAINT/DIAGNOSIS: LEFT proximal humerus fracture (12/11/21)    SUBJECTIVE: The patient is sitting up in bed. She has contractures to her bilateral hands. She has a brace in the left hand. She is wearing a sling on the left side as well. She denies new issues. OBJECTIVE  Physical    VITALS:  BP (!) 150/86   Pulse 69   Temp 98.2 °F (36.8 °C) (Oral)   Resp 17   Ht 5' (1.524 m)   Wt 110 lb (49.9 kg)   SpO2 98%   BMI 21.48 kg/m²     GENERAL: Alert and oriented x3, in no acute distress. MUSCULOSKELETAL: Able to flex and extend the wrist without issue. ROM: left shoulder ROM deferred. ROM of operative elbow is limited secondary to pain and swelling. Bilateral hand contractures noted. Left wrist splint is in place and well fitted. Sensory:  SILT in axillary, PIN, IO, radial, ulnar distributions. Vascular:   2+ radial pulses with brisk cap refill. Data    ALL MEDICATIONS HAVE BEEN REVIEWED    CBC:   Recent Labs     12/12/21  0648 12/13/21  0744 12/14/21  0654   WBC 6.7 11.1* 6.3   HGB 15.8 13.2 11.7*   HCT 47.1 40.0 34.5*    182 163     BMP:   Recent Labs     12/12/21  0753 12/13/21  0744 12/14/21  0655    137 137   K 4.3 4.4 4.1    103 104   CO2 21 20* 24   BUN 18 17 22*   CREATININE 0.7 0.6 0.6     INR:   Recent Labs     12/12/21  0753 12/13/21  0744   INR 2.10* 2.35*       ASSESSMENT:  Left proximal humerus fracture (12/11/2020)  Left impacted distal radius fracture  Chronic warfarin therapy for chronic A. Fib  HTN   GERD  Dementia    PLAN:   Nonoperative treatment plan for the left distal radius fracture. - WB status:  NO weight bearing through left arm; Continue sling.   -Continue left wrist splint  - DVT prophylaxis: Warfarin per primary team  - PT/OT  - Pain Control: Add scheduled Tylenol and tramadol as needed. - Dispo: per primary team; ok to d/c from our end.     Follow-up with Dr. Eula Granados in 2 weeks for repeat films.   Office # [phone]  No future appointments.     GRAHAM Franklin CNP  12/14/2021  3:34 PM

## 2021-12-14 NOTE — DISCHARGE INSTR - COC
Continuity of Care Form    Patient Name: Obdulia Vanessa   :  3/2/1928  MRN:  [id number]    [facility] date:  2021  Discharge date:  2021    Code Status Order: Prior   Advance Directives:      Admitting Physician:  Pascale Murrell MD  PCP: Celine Aguirre MD    Discharging Nurse: [facility] Unit/Room#: 3CO-3174/2233-38  Discharging Unit Phone Number: ***    Emergency Contact:   Extended Emergency Contact Information  Primary Emergency Contact: Deysi Michael  Home Phone: [phone]  Mobile Phone: [phone]  Relation: Child    Past Surgical History:  Past Surgical History:   Procedure Laterality Date    APPENDECTOMY      HIP ARTHROPLASTY Right 2017    RIGHT BIPOLAR HIP HEMIARTHROPLASTY        PACEMAKER INSERTION         Immunization History:   Immunization History   Administered Date(s) Administered    Influenza Vaccine, unspecified formulation 10/01/2016    Pneumococcal Conjugate 13-valent (Suzzanne Marrow) 10/22/2016    Tdap (Boostrix, Adacel) 10/07/2020       Active Problems:  Patient Active Problem List   Diagnosis Code    HTN (hypertension) I10    Hyperkalemia E87.5    Knee pain M25.569    Atrial fibrillation with RVR ([facility] [address] I48.91    17 RIGHT hip hemiarthroplasty S72.001A    Dementia ([facility] [address] F03.90    GERD (gastroesophageal reflux disease) K21.9    Chronic atrial fibrillation (HCC) I48.20    Cardiomyopathy, ischemic I25.5    Chronic anticoagulation Z79.01    Generalized weakness R53.1    Hypertensive urgency I16.0    Closed fracture of proximal end of left humerus S42.202A       Isolation/Infection:   Isolation            No Isolation          Patient Infection Status       Infection Onset Added Last Indicated Last Indicated By Review Planned Expiration Resolved Resolved By    None active    Resolved    COVID-19 (Rule Out) 10/09/20 10/09/20 10/09/20 COVID-19 (Ordered)   10/09/20 Rule-Out Test Resulted            Nurse Assessment:  Last Vital Signs: BP (!) 144/83   Pulse 76 Temp 98.1 °F (36.7 °C) (Oral)   Resp 16   Ht 5' (1.524 m)   Wt 110 lb (49.9 kg)   SpO2 98%   BMI 21.48 kg/m²     Last documented pain score (0-10 scale): Pain Level: 7  Last Weight:   Wt Readings from Last 1 Encounters:   12/12/21 110 lb (49.9 kg)     Mental Status:  Alert & Oriented x 4    IV Access:  - None    Nursing Mobility/ADLs:  Walking   Assisted  Transfer  Assisted  Bathing  Assisted  Dressing  Assisted  Toileting  Assisted  Feeding  Independent  Med Admin  Assisted  Med Delivery   whole    Wound Care Documentation and Therapy:  Incision 05/25/17 Hip Right (Active)   Number of days: 5378        Elimination:  Continence: Bowel: Yes  Bladder: No  Urinary Catheter: None   Colostomy/Ileostomy/Ileal Conduit: No       Date of Last BM: 12/12/2021    Intake/Output Summary (Last 24 hours) at 12/14/2021 0853  Last data filed at 12/13/2021 1720  Gross per 24 hour   Intake --   Output 800 ml   Net -800 ml     I/O last 3 completed shifts:  In: -   Out: 800 [Urine:800]    Safety Concerns:     History of Falls (last 30 days)    Impairments/Disabilities:      Contractures - fingers    Nutrition Therapy:  Current Nutrition Therapy:   - Oral Diet:  General    Routes of Feeding: Oral  Liquids: No Restrictions  Daily Fluid Restriction: no  Last Modified Barium Swallow with Video (Video Swallowing Test): not done    Treatments at the Time of Hospital Discharge:   Respiratory Treatments: none  Oxygen Therapy:  is not on home oxygen therapy. Ventilator:    - No ventilator support    Rehab Therapies: {THERAPEUTIC INTERVENTION:[id number]}  Weight Bearing Status/Restrictions: No weight bearing restirctions  Other Medical Equipment (for information only, NOT a DME order):   Katina Darius  Other Treatments: none    Patient's personal belongings (please select all that are sent with patient):  Personal items    RN SIGNATURE:  Electronically signed by Deanna Robins RN on 12/14/21 at 4:31 PM EST    CASE MANAGEMENT/SOCIAL WORK SECTION    Inpatient Status Date: 12/12/2021    Readmission Risk Assessment Score:  Readmission Risk              Risk of Unplanned Readmission:  14           Discharging to Facility/ Agency   Name: [facility]  Address: [address], [address]  Phone: [phone]  Fax: [phone]      / signature: Electronically signed by Dayami Smith RN on 12/14/21 at 4:16 PM EST    PHYSICIAN SECTION    Prognosis: Fair    Condition at Discharge: Stable    Rehab Potential (if transferring to Rehab): Good    Recommended Labs or Other Treatments After Discharge:      Physician Certification: I certify the above information and transfer of Belkys Nunez  is necessary for the continuing treatment of the diagnosis listed and that she requires [facility] for greater 30 days.      Update Admission H&P: No change in H&P    PHYSICIAN SIGNATURE:  Electronically signed by Jerrica Zambrano MD on 12/14/21 at 8:53 AM EST

## 2021-12-14 NOTE — DISCHARGE SUMMARY
[facility] -- Physician Discharge Summary     Adri Martel  3/2/1928  MRN: [id number]    Admit Date: 12/12/2021  Discharge Date: No discharge date for patient encounter. Attending MD: Rafal Martell MD  Discharging MD: Rafal Martell MD  PCP: Amon Boas, MD [address] [place] [id number] [phone]    Admission Diagnosis: UTI (urinary tract infection) [N39.0]  Chronic anticoagulation [Z79.01]  FTT (failure to thrive) in adult [R62.7]  Fall, initial encounter [W19. XXXA]  Closed fracture of proximal end of left humerus, unspecified fracture morphology, initial encounter [S42.202A]  DISCHARGE DIAGNOSIS: same    Full Hospital Problem List:  Active Hospital Problems    Diagnosis Date Noted    Hypertensive urgency [I16.0] 12/12/2021    Closed fracture of proximal end of left humerus [S42.202A] 12/12/2021    Chronic atrial fibrillation (HCC) [I48.20] 05/25/2017    Cardiomyopathy, ischemic [I25.5] 05/25/2017    Chronic anticoagulation [Z79.01]     GERD (gastroesophageal reflux disease) [K21.9] 05/24/2017    Dementia ([facility] [address] [F03.90] 05/24/2017    Atrial fibrillation with RVR ([facility] [address] [I48.91]     HTN (hypertension) [I10] 10/21/2016    Knee pain [M25.569] 10/21/2016           Hospital Course:  59-year-old white American  lady who came to the emergency room, has severe dementia, has history of fall. The patient is a poor historian and unable to give any history precisely. The patient was brought here following a fall. The patient had a mechanical fall and injury while attempting to go to the bathroom    She is seen by surgery for left shoulder proximal humerus fracture.     PLAN: At this time, we will treat the patient nonoperatively.      PT.OT rec SNF for patient  This will be set up on d/c    Consults made during Hospitalization:  [address] [place] [address] TO SOCIAL WORK    Treatment team at time of Discharge: Treatment Team: Attending Provider: Jourdan Mora MD; Consulting Physician: Nadia Polanco MD; Respiratory Therapist (Day): Carlos Phan RCP; Patient Care Tech: Gerhardt Baron; Registered Nurse: Gagan Slade RN    Imaging Results:  XR HAND LEFT (2 VIEWS)    Result Date: 12/13/2021  EXAMINATION: 2 XRAY VIEWS OF THE LEFT HAND 12/13/2021 2:12 pm COMPARISON: None. HISTORY: ORDERING SYSTEM PROVIDED HISTORY: pain s/p fall, baseline contractures TECHNOLOGIST PROVIDED HISTORY: Reason for exam:->pain s/p fall, baseline contractures Reason for Exam: baseline contractures, pain s/p fall FINDINGS: Evaluation of the distal portion of the proximal phalanx of the 4th finger is suboptimal, secondary to the presence of metallic rings. Evaluation of the phalanges is difficult, secondary to significant contracture deformities involving the 1st, 3rd, 4th, and 5th fingers. There is an impacted and mildly displaced fracture involving the distal radius. No dislocation is seen. No other fracture is appreciated. Diffuse osteopenia is identified. Osteo-degenerative changes are noted at the 1st carpometacarpal, 1st metacarpophalangeal, as well as multiple interphalangeal joints, with osteophyte formation present. There is lateral subluxation of the base of the 1st metacarpal bone, which is most likely degenerative in nature. No other significant osseous abnormality is identified. Triangular fibrocartilage calcifications are seen. Soft tissue swelling is noted about the wrist and portions of the hand. Suboptimal examination, as described above. Impacted and mildly displaced fracture involving the distal left radius. Diffuse osteopenia. Osteo-degenerative changes, as described above. Triangular fibrocartilage calcifications. Soft tissue swelling involving the wrist and portions of the hand.      XR HAND RIGHT (2 VIEWS)    Result Date: 12/13/2021  EXAMINATION: 2 XRAY VIEWS OF THE RIGHT HAND 12/13/2021 2:12 pm COMPARISON: None. HISTORY: ORDERING SYSTEM PROVIDED HISTORY: pain s/p fall, baseline contractures TECHNOLOGIST PROVIDED HISTORY: Reason for exam:->pain s/p fall, baseline contractures Reason for Exam: baseline contractures, pain s/p fall FINDINGS: Evaluation of the 3rd through 5th phalanges is suboptimal, secondary to significant contracture deformities. There is no evidence of fracture or dislocation. Osteo-degenerative changes are noted at the 1st carpometacarpal, as well as multiple interphalangeal joints, with osteophyte formation present. There is lateral subluxation of the base of the 1st metacarpal bone, most likely degenerative in nature. Diffuse osteopenia is seen. No other significant osseous abnormality is appreciated. Extensive triangular fibrocartilage calcifications are noted. Evaluation of the 3rd through 5th phalanges is suboptimal, as described above. No fracture or dislocation. Osteo-degenerative changes, as described above. Diffuse osteopenia. Triangular fibrocartilage calcifications. XR SHOULDER LEFT (MIN 2 VIEWS)    Result Date: 12/12/2021  EXAMINATION: THREE XRAY VIEWS OF THE LEFT SHOULDER 12/12/2021 7:20 am COMPARISON: None. HISTORY: ORDERING SYSTEM PROVIDED HISTORY: trauma, TECHNOLOGIST PROVIDED HISTORY: Reason for exam:->trauma, Reason for Exam: Fall FINDINGS: There is a comminuted fracture of the left humeral neck/head. There is inferior subluxation of the humeral head in relation to the glenoid. There is decreased bone mineralization. The acromioclavicular joint is intact. The visualized lung is clear. Comminuted fracture of the left humeral head/neck. Inferior subluxation of the humeral head in relation to the glenoid. XR CHEST PORTABLE    Result Date: 12/12/2021  EXAMINATION: ONE XRAY VIEW OF THE CHEST 12/12/2021 7:20 am COMPARISON: Chest x-ray dated 10/07/2020.  HISTORY: ORDERING SYSTEM PROVIDED HISTORY: trauma TECHNOLOGIST PROVIDED HISTORY: Reason for exam:->trauma Reason for Exam: Fall FINDINGS: LINES/TUBES/OTHER: Left subclavian pacemaker again noted. HEART/MEDIASTINUM: The cardiomediastinal silhouette is stable. PLEURA/LUNGS: There is elevation of the left hemithorax. Chronic interstitial markings again noted. There are no focal consolidations or pleural effusions. There is no appreciable pneumothorax. BONES/SOFT TISSUE: No acute abnormality. Old right rib fractures again noted. No radiographic evidence of acute pulmonary disease. XR HIP 2-3 VW W PELVIS LEFT    Result Date: 12/12/2021  EXAMINATION: ONE XRAY VIEW OF THE PELVIS AND TWO XRAY VIEWS LEFT HIP 12/12/2021 7:20 am COMPARISON: 08/29/2017 HISTORY: ORDERING SYSTEM PROVIDED HISTORY: trauma, TECHNOLOGIST PROVIDED HISTORY: Reason for exam:->trauma, Reason for Exam: Fall FINDINGS: There is no acute fracture or dislocation. The bones are demineralized. Status post right hip arthroplasty. There is mild left hip osteoarthritis. There are no bony destructive lesions. Degenerative changes involve the lumbar spine and bilateral sacroiliac joints. Vascular calcifications are noted. 1. No acute abnormality.          Discharge Exam:  BP (!) 144/83   Pulse 76   Temp 98.1 °F (36.7 °C) (Oral)   Resp 16   Ht 5' (1.524 m)   Wt 110 lb (49.9 kg)   SpO2 98%   BMI 21.48 kg/m²   General appearance: alert, appears stated age and cooperative  Head: Normocephalic, without obvious abnormality, atraumatic  Lungs: clear to auscultation bilaterally  Heart: regular rate and rhythm, S1, S2 normal, no murmur, click, rub or gallop  Abdomen: soft, non-tender; bowel sounds normal; no masses,  no organomegaly  Extremities: L arm in sling    Disposition: SNF    Condition: stable    Discharge Medications:     Medication List      ASK your doctor about these medications    latanoprost 0.005 % ophthalmic solution  Commonly known as: XALATAN     levothyroxine 50 MCG tablet  Commonly known as: SYNTHROID     lisinopril 2.5 MG tablet  Commonly known as: PRINIVIL;ZESTRIL     metoprolol succinate 25 MG extended release tablet  Commonly known as: TOPROL XL     nitroGLYCERIN 0.4 MG SL tablet  Commonly known as: NITROSTAT     warfarin 1 MG tablet  Commonly known as: COUMADIN            Allergies: Allergies   Allergen Reactions    Pcn [Penicillins] Hives       Follow up Instructions: Follow-up with PCP: Manfred Langley MD in 2 wk .       Total time spent on day of discharge including face-to-face visit, examination, documentation, counseling, preparation of discharge plans and followup, and discharge medicine reconciliation and presciptions is 38 minutes    Signed:  Yudi Roblero MD  12/14/2021

## 2021-12-14 NOTE — PROGRESS NOTES
Shift assessment completed, Pt is alert self, person, and situation but confused about time. LUE edema, +2, pale color, weak pulse +1. Bruising upper and lower extremities. Contracture RUE. Low urine output. Pain controlled with PRN Tramadol and ice pack. VSS. Safety precautions in place.  Bed  In low position, bed alarm on, call light in reach

## 2021-12-14 NOTE — PROGRESS NOTES
Physical Therapy  Facility/Department: [address] ORTHO/NEURO NURSING  Daily Treatment Note  NAME: Ynes Torres  : 3/2/1928  MRN: [id number]    Date of Service: 2021    Discharge Recommendations:    Ynes Torres scored a 10/24 on the AM-PAC short mobility form. Current research shows that an AM-PAC score of 17 or less is typically not associated with a discharge to the patient's home setting. Based on the patient's AM-PAC score and their current functional mobility deficits, it is recommended that the patient have 3-5 sessions per week of Physical Therapy at d/c to increase the patient's independence. Please see assessment section for further patient specific details. If patient discharges prior to next session this note will serve as a discharge summary. Please see below for the latest assessment towards goals. PT Equipment Recommendations  Equipment Needed: No  Other: Pt would possibly need hemiwalker vs quad cane pending progress; pt only has RW at home and is currently NWB through LUE    Assessment   Body structures, Functions, Activity limitations: Decreased functional mobility ; Decreased ADL status; Decreased ROM; Decreased strength; Decreased cognition; Decreased endurance; Decreased balance; Increased pain; Decreased posture  Assessment: Pt presents as below her baseline function and would benefit from skilled PT services to promote safe return to PLOF. At this time, pt requiring assistance for all aspects of mobility, including bed mobility secondary to significant LUE pain and NWB status of LUE. Prognosis: Good  Decision Making: Medium Complexity  PT Education: PT Role; Plan of Care; Goals  Patient Education: D/C recommendations--pt verbalized understanding, may benefit from reinforcement  Barriers to Learning: Cognition  REQUIRES PT FOLLOW UP: Yes  Activity Tolerance  Activity Tolerance: Patient limited by pain; Patient limited by cognitive status;  Other  Activity Tolerance: difficulty motor planning     Patient Diagnosis(es): The primary encounter diagnosis was Closed fracture of proximal end of left humerus, unspecified fracture morphology, initial encounter. Diagnoses of Fall, initial encounter, Chronic anticoagulation, and FTT (failure to thrive) in adult were also pertinent to this visit. has a past medical history of Anemia, CAD (coronary artery disease), Glaucoma, High blood pressure, and Myocardial infarction ([address] has a past surgical history that includes Pacemaker insertion; Appendectomy; and Hip Arthroplasty (Right, 05/25/2017). Restrictions  Restrictions/Precautions  Restrictions/Precautions: Weight Bearing, Fall Risk (high fall risk)  Required Braces or Orthoses?: Yes  Upper Extremity Weight Bearing Restrictions  Left Upper Extremity Weight Bearing: Non Weight Bearing  Other: OK light ROM to L elbow, wrist, and hand  Required Braces or Orthoses  Left Upper Extremity Brace/Splint: Sling (hand splint)  Position Activity Restriction  Other position/activity restrictions: Ms. Amparo Finney is being seen for evaluation of of the left shoulder. The patient is a 80-year-old female who reportedly lost her balance while going to the bathroom. The patient does ambulate with a walker. She has numerous contractures. She does live with her daughter. She is right-hand dominant. She reports severe left shoulder pain. Orthopedics is being consulted for the left shoulder issues.   Subjective   Orientation  Orientation  Overall Orientation Status: Impaired  Orientation Level: Oriented to person; Oriented to situation; Disoriented to time; Oriented to place  Cognition    Objective   Bed mobility  Supine to Sit: Contact guard assistance  Scooting: Maximal assistance (to EOB)  Comment: Initially CGA for LUE support but pt unable to maintain sitting EOB without scooting resulting in posterior lean  Transfers  Sit to Stand: Dependent/Total (Mod A of 2)  Stand to sit: Dependent/Total (Mod A of 2)  Bed to Chair: Dependent/Total; 2 Person Assistance (Min A of 2 with STEDY)  Comment: STEDY Stood 3x with posterior lean Min A of 2. Unable to follow VC and TC for B foot placement  Ambulation  Ambulation?: No  Balance  Posture: Fair  Sitting - Static: Good; -  Sitting - Dynamic: Fair; +  Standing - Static: Poor  Standing - Dynamic: Poor  Comments: Stood in STEDY Max A for periarea care and donning of brief. AM-PAC Score  AM-PAC Inpatient Mobility Raw Score : 10 (12/14/21 1357)  AM-PAC Inpatient T-Scale Score : 32.29 (12/14/21 1357)  Mobility Inpatient CMS 0-100% Score: 76.75 (12/14/21 1357)  Mobility Inpatient CMS G-Code Modifier : CL (12/14/21 1357)  Goals  Short term goals  Time Frame for Short term goals: To be met prior to discharge  Short term goal 1: Pt will complete bed mobility with CGA  Short term goal 2: Pt will complete sit to/from stand with min A  Short term goal 3: Pt will transfer bed to/from chair with min A  Short term goal 4: Pt will ambulate 10 ft with LRAD and min A  Plan    Plan  Times per week: 7x  Times per day: Daily  Current Treatment Recommendations: Strengthening, ROM, Balance Training, Functional Mobility Training, Transfer Training, Endurance Training, Gait Training, Stair training, Home Exercise Program, Safety Education & Training  Safety Devices  Type of devices:  All fall risk precautions in place, Call light within reach, Chair alarm in place, Gait belt, Patient at risk for falls, Left in chair, Telesitter in use, Nurse notified  Restraints  Initially in place: No     Therapy Time   Individual Concurrent Group Co-treatment   Time In       1305   Time Out       1345   Minutes       40   Timed Code Treatment Minutes: [address], [address]

## 2021-12-14 NOTE — PROGRESS NOTES
Occupational Therapy  Facility/Department: [address] ORTHO/NEURO NURSING  Daily Treatment Note  NAME: Zainab Yang  : 3/2/1928  MRN: [id number]    Date of Service: 2021    Discharge Recommendations: Zainab Yang scored a  on the AM-PAC ADL Inpatient form. Current research shows that an AM-PAC score of 17 or less is typically not associated with a discharge to the patient's home setting. Based on the patient's AM-PAC score and their current ADL deficits, it is recommended that the patient have 3-5 sessions per week of Occupational Therapy at d/c to increase the patient's independence. Please see assessment section for further patient specific details. If patient discharges prior to next session this note will serve as a discharge summary. Please see below for the latest assessment towards goals. OT Equipment Recommendations  Equipment Needed: No  Other: defer to next level of care    Assessment   Performance deficits / Impairments: Decreased functional mobility ; Decreased ADL status; Decreased endurance; Decreased coordination; Decreased ROM; Decreased balance; Decreased cognition  Assessment: Pt is below her baseline level of occupational function, based on the above deficits associated with UTI, L left shoulder proximal humerus fracture. Pt would benefit from continued skilled acute OT services to address these deficits; Pt currently requiring assist of 2 for mobility/transfers and LB ADL tasks, attempted stances with cane this date however ot unsafe to ambulate with device secondary to decreased motor planning/coordination. Continue POC. Treatment Diagnosis: Decreased ADL status, functional mobility, endurance, coordination, ADL status, ROM, balance, and cognition associated with UTI, UTI, L left shoulder proximal humerus fracture  Prognosis: Good  OT Education: OT Role; Plan of Care; ADL Adaptive Strategies; Transfer Training; Precautions;  Equipment  Patient Education: Pt needs reinforcement of learning  Barriers to Learning: Cognition, physical  REQUIRES OT FOLLOW UP: Yes  Activity Tolerance  Activity Tolerance: Patient Tolerated treatment well; Patient limited by fatigue  Safety Devices  Safety Devices in place: Yes  Type of devices: Left in chair; All fall risk precautions in place; Nurse notified; Call light within reach; Chair alarm in place         Patient Diagnosis(es): The primary encounter diagnosis was Closed fracture of proximal end of left humerus, unspecified fracture morphology, initial encounter. Diagnoses of Fall, initial encounter, Chronic anticoagulation, and FTT (failure to thrive) in adult were also pertinent to this visit. has a past medical history of Anemia, CAD (coronary artery disease), Glaucoma, High blood pressure, and Myocardial infarction ([facility] [address] has a past surgical history that includes Pacemaker insertion; Appendectomy; and Hip Arthroplasty (Right, 05/25/2017). Restrictions  Restrictions/Precautions  Restrictions/Precautions: Weight Bearing, Fall Risk (high fall risk)  Required Braces or Orthoses?: Yes  Upper Extremity Weight Bearing Restrictions  Left Upper Extremity Weight Bearing: Non Weight Bearing  Other: OK light ROM to L elbow, wrist, and hand  Required Braces or Orthoses  Left Upper Extremity Brace/Splint: Sling (+ hand splint)  Position Activity Restriction  Other position/activity restrictions: Ms. Zayra Berry is being seen for evaluation of of the left shoulder. The patient is a 80-year-old female who reportedly lost her balance while going to the bathroom. The patient does ambulate with a walker. She has numerous contractures. She does live with her daughter. She is right-hand dominant. She reports severe left shoulder pain. Orthopedics is being consulted for the left shoulder issues.     Subjective   General  Chart Reviewed: Yes  Family / Caregiver Present: No  Diagnosis: UTI, L left shoulder proximal humerus fracture  Subjective  Subjective: Pt supine in bed, agreeable to session- word finding difficulties, disoriented to place/time; denied pain at rest, intermittent pain in L UE during session however pt able to participate  Vital Signs  Patient Currently in Pain: Denies        Objective    ADL  Grooming: Setup; Minimal assistance (oral care seated in recliner)  LE Dressing: Dependent/Total  Toileting: Dependent/Total  Additional Comments: purewick removed bed level- brief change and carina hygiene completed at EOS with stances into stedy- purewick replaced at EOS per RN request/approval; grooming completed in recliner- other ADL needs declined        Balance  Sitting Balance:  (up to modA seated EOB)  Standing Balance: Dependent/Total (assist of 2 and/or use of stedy)  Standing Balance  Time: 2-3min total  Activity: 2 static stances with cane from EOB, static stances in stedy  Comment: significant posterior lean requiring ModA to correct in stance; pt with difficulty coordinating placement of R LE    Functional Mobility  Assist Level: Dependent/Total  Functional Mobility Comments: stedy for EOB to recliner    Bed mobility  Supine to Sit: Contact guard assistance  Scooting: Maximal assistance (to EOB)  Comment: initially CGA for LUE support, but pt unable to maintain sitting EOB without scooting resulting in posterior lean     Transfers  Sit to stand: 2 Person assistance; Dependent/Total  Stand to sit: 2 Person assistance; Dependent/Total  Transfer Comments: ModA of 2, LE blocking for positioning, increased assist d/t posterior lean - completed 2 stances up to cane from EOB to assess safety/functional mobility device options; 2 stances into stedy            Cognition  Overall Cognitive Status: Exceptions  Arousal/Alertness: Appropriate responses to stimuli  Following Commands:  Follows one step commands with increased time  Attention Span: Appears intact  Memory: Decreased recall of biographical Information  Problem Solving: Assistance required to generate solutions; Assistance required to implement solutions  Insights: Decreased awareness of deficits  Initiation: Requires cues for some  Sequencing: Requires cues for some                        Plan   Plan  Times per week: 7x  Times per day: Daily  Current Treatment Recommendations: Self-Care / ADL, Endurance Training, Functional Mobility Training, Cognitive Reorientation, Balance Training                    AM-PAC Score        AM-[facility] Inpatient Daily Activity Raw Score: 12 (12/14/21 1448)  AM-PAC Inpatient ADL T-Scale Score : 30.6 (12/14/21 1448)  ADL Inpatient CMS 0-100% Score: 66.57 (12/14/21 1448)  ADL Inpatient CMS G-Code Modifier : CL (12/14/21 1448)    Goals  Short term goals  Time Frame for Short term goals: Discharge  Short term goal 1: Min A for bed mobility-- A of 2 12/14  Short term goal 2: Min A for functional transfers to ADL surfaces w/AAD-- ongoing 12/14  Short term goal 3: Min A for UB bathing, Mod A for UB dressing using adaptive techniques-- not addressed/pt declined needs 12/14  Short term goal 4: Mod A for LB bathing/dressing-- A of 2 for brief change 12/14  Short term goal 5: S/U for grooming-- Mary 12/14       Therapy Time   Individual Concurrent Group Co-treatment   Time In       3685   Time Out       1348   Minutes       43        Timed Code Treatment Minutes:43  Total Treatment Minutes:  [address] [facility]

## 2021-12-14 NOTE — PROGRESS NOTES
Physician Progress Note      Natalie Amor  CSN #:                  [id number]  :                       3/2/1928  ADMIT DATE:       2021 6:30 AM  [address] [place] DATE:  RESPONDING  PROVIDER #:        Salome Sanchez MD          QUERY TEXT:    Pt admitted with Left humerus fracture after a fall. Pt noted to have diffuse   Osteopenia in X ray results . If possible, please document in progress notes   and discharge summary if you are evaluating and/or treating any of the   following: The medical record reflects the following:  Risk Factors: Age, Osteoporosis  Clinical Indicators: Patient had a mechanical fall at home, she lost her   balance while ambulating to the bathroom with her walker. Per Orthopedic   Surgery consult note of 21, patient with severe left shoulder pain, Xray   with comminuted left proximal humerus fracture. Addendum to Ortho note of    with Left impacted distal radius fracture noted on plain films. Xray of   the left hand with diffuse osteopenia is identified and Osteo-degenerative   changes noted. Treatment: Imaging, Ortho consult, Splinting. PT OT evaluations  Options provided:  -- Pathological Left humerus and Left radius fracture  -- Pathological Left humerus and Left radius fracture due to osteopenia  -- Pathological Left humerus and Left radius fracture due to osteopenia   following fall which would not usually break a normal, healthy bone  -- Other - I will add my own diagnosis  -- Disagree - Not applicable / Not valid  -- Disagree - Clinically unable to determine / Unknown  -- Refer to Clinical Documentation Reviewer    PROVIDER RESPONSE TEXT:    This patient has a pathological Left humerus and Left radius fracture due to   osteopenia.     Query created by: Lynda Waddell on 2021 9:33 AM      Electronically signed by:  Salome Sanchez MD 2021 10:24 AM

## 2021-12-14 NOTE — CARE COORDINATION
Discharge Planning Assessment  Risk of Readmission Score 10%    RN discharge planner met with patient and talked to daughter, Krystina Dean, to discuss reason for admission, current living situation, and potential needs at the time of discharge    Demographics/Insurance verified Yes    Current type of dwelling: UUnknown    Patient from [facility]/ confirmed with: N/A    Living arrangements: Daughter    Level of function/Support: The patient needed assistance with all ADL's before hospitalization. The daughter is currently having medical problems and is unable to have her mother return home at this time. PCP: Bran Llanes MD    Last Visit to PCP: unknown    DME: Tricia Garcia with any community resources/agencies/skilled home care: N/A    Medication compliance issues: The daughter oversees the patient's medications. Financial issues that could impact healthcare: No    Tentative discharge plan: Pt/OT is recommending SNF. Referrals sent to [facility]. Waiting on response.      Transportation at the time of discharge: ADRIENNE Griffiths RN  Case Manger    Phone: [phone]

## 2021-12-14 NOTE — PLAN OF CARE
Problem: Skin Integrity:  Goal: Will show no infection signs and symptoms  Description: Will show no infection signs and symptoms  12/14/2021 0009 by Macho Ness RN  Outcome: Ongoing  12/13/2021 1157 by Stacy Ordaz RN  Outcome: Ongoing  Goal: Absence of new skin breakdown  Description: Absence of new skin breakdown  12/14/2021 0009 by Macho Ness RN  Outcome: Ongoing  12/13/2021 1157 by Stacy Ordaz RN  Outcome: Ongoing     Problem: Falls - Risk of:  Goal: Will remain free from falls  Description: Will remain free from falls  12/14/2021 0009 by Macho Ness RN  Outcome: Ongoing  12/13/2021 1157 by Stacy Ordaz RN  Outcome: Ongoing  Goal: Absence of physical injury  Description: Absence of physical injury  12/14/2021 0009 by Macho Ness RN  Outcome: Ongoing  12/13/2021 1157 by Stacy Ordaz RN  Outcome: Ongoing     Problem: Pain:  Goal: Pain level will decrease  Description: Pain level will decrease  12/14/2021 0009 by Macho Ness RN  Outcome: Ongoing  12/13/2021 1157 by Stacy Ordaz RN  Outcome: Ongoing  Goal: Control of acute pain  Description: Control of acute pain  12/14/2021 0009 by Macho Ness RN  Outcome: Ongoing  12/13/2021 1157 by Stacy Ordaz RN  Outcome: Ongoing  Goal: Control of chronic pain  Description: Control of chronic pain  12/14/2021 0009 by Macho Ness RN  Outcome: Ongoing  12/13/2021 1157 by Stacy Ordaz RN  Outcome: Ongoing

## 2021-12-15 NOTE — PROGRESS NOTES
Patient discharged via medical transport to [address] [place] [address] with all belongings.     Electronically signed by Candida Em RN on 12/15/2021 at 1:09 AM

## 2021-12-15 NOTE — PROGRESS NOTES
Physician Progress Note      PATIENT:               Brynn Kennedy  CSN #:                  [id number]  :                       3/2/1928  ADMIT DATE:       2021 6:30 AM  [address] [place] DATE:        2021 11:00 PM  RESPONDING  PROVIDER #:        Diana Aquino MD          QUERY TEXT:    Patient admitted with Fractures of Left humerus and Radius after a fall. Noted   documentation of UTI in admit order and in Discharge Summary  note of 21. In order to support the diagnosis of UTI, please include   additional clinical indicators in your documentation. Or please document if   the diagnosis of UTI has been ruled out after further study. The medical record reflects the following:  Risk Factors: Age, limited mobility  Clinical Indicators: Patient admitted with fracture of Left humerus and radius   after a mechanical fall at home. In the discharge summary from 21,   Urinary tract infection is listed in Admission Diagnosis list.  Patient did   receive 1 dose of Rocephin in the Emergency department, no urine specimens   were collected during hospitalization. Treatment: Rocephin x 1 dose. Options provided:  -- UTI was ruled out  -- UTI present as evidenced by, Please document evidence. -- Other - I will add my own diagnosis  -- Disagree - Not applicable / Not valid  -- Disagree - Clinically unable to determine / Unknown  -- Refer to Clinical Documentation Reviewer    PROVIDER RESPONSE TEXT:    UTI was ruled out after study.     Query created by: Leatha Andrade on 12/15/2021 10:55 AM      Electronically signed by:  Diana Aquino MD 12/15/2021 11:57 AM

## 2021-12-15 NOTE — PLAN OF CARE
Problem: Skin Integrity:  Goal: Will show no infection signs and symptoms  Description: Will show no infection signs and symptoms  Outcome: Completed  Goal: Absence of new skin breakdown  Description: Absence of new skin breakdown  Outcome: Completed     Problem: Falls - Risk of:  Goal: Will remain free from falls  Description: Will remain free from falls  Outcome: Completed  Goal: Absence of physical injury  Description: Absence of physical injury  Outcome: Completed     Problem: Pain:  Description: Pain management should include both nonpharmacologic and pharmacologic interventions.   Goal: Pain level will decrease  Description: Pain level will decrease  Outcome: Completed  Goal: Control of acute pain  Description: Control of acute pain  Outcome: Completed  Goal: Control of chronic pain  Description: Control of chronic pain  Outcome: Completed

## 2022-01-04 ENCOUNTER — OFFICE VISIT (OUTPATIENT)
Dept: ORTHOPEDIC SURGERY | Age: 87
End: 2022-01-04
Payer: MEDICARE

## 2022-01-04 VITALS — BODY MASS INDEX: 22.58 KG/M2 | WEIGHT: 115 LBS | HEIGHT: 60 IN

## 2022-01-04 DIAGNOSIS — M25.532 ACUTE PAIN OF LEFT WRIST: ICD-10-CM

## 2022-01-04 DIAGNOSIS — M25.512 ACUTE PAIN OF LEFT SHOULDER: ICD-10-CM

## 2022-01-04 DIAGNOSIS — M25.522 PAIN IN LEFT ELBOW: ICD-10-CM

## 2022-01-04 DIAGNOSIS — S42.202A DISPLACED FRACTURE OF PROXIMAL END OF LEFT HUMERUS: ICD-10-CM

## 2022-01-04 DIAGNOSIS — S52.532A CLOSED COLLES' FRACTURE OF LEFT RADIUS, INITIAL ENCOUNTER: ICD-10-CM

## 2022-01-04 PROCEDURE — 29105 APPLICATION LONG ARM SPLINT: CPT | Performed by: INTERNAL MEDICINE

## 2022-01-04 PROCEDURE — 1123F ACP DISCUSS/DSCN MKR DOCD: CPT | Performed by: INTERNAL MEDICINE

## 2022-01-04 PROCEDURE — 99024 POSTOP FOLLOW-UP VISIT: CPT | Performed by: INTERNAL MEDICINE

## 2022-01-04 PROCEDURE — 1090F PRES/ABSN URINE INCON ASSESS: CPT | Performed by: INTERNAL MEDICINE

## 2022-01-04 PROCEDURE — G8427 DOCREV CUR MEDS BY ELIG CLIN: HCPCS | Performed by: INTERNAL MEDICINE

## 2022-01-04 PROCEDURE — G8484 FLU IMMUNIZE NO ADMIN: HCPCS | Performed by: INTERNAL MEDICINE

## 2022-01-04 PROCEDURE — L3660 SO 8 AB RSTR CAN/WEB PRE OTS: HCPCS | Performed by: INTERNAL MEDICINE

## 2022-01-04 PROCEDURE — 4040F PNEUMOC VAC/ADMIN/RCVD: CPT | Performed by: INTERNAL MEDICINE

## 2022-01-04 PROCEDURE — G8420 CALC BMI NORM PARAMETERS: HCPCS | Performed by: INTERNAL MEDICINE

## 2022-01-04 PROCEDURE — 4004F PT TOBACCO SCREEN RCVD TLK: CPT | Performed by: INTERNAL MEDICINE

## 2022-01-04 PROCEDURE — 1111F DSCHRG MED/CURRENT MED MERGE: CPT | Performed by: INTERNAL MEDICINE

## 2022-01-04 PROCEDURE — 99204 OFFICE O/P NEW MOD 45 MIN: CPT | Performed by: INTERNAL MEDICINE

## 2022-01-05 NOTE — PROGRESS NOTES
Chief Complaint:   Chief Complaint   Patient presents with    Injury     new patient L Humerous due to fall.  Wrist Pain     left, very painful, fx in fall 10/10 pain, swelling          History of Present Illness:       Patient is a 80 y.o. female presents with the above complaint. The symptoms began 12/12/2021  and started with an injury. She apparently fell within the home and this was unwitnessed, however her adult daughter lives in the home with her. She was triaged in the emergency room and presents here for further evaluation. She was diagnosed with a proximal humerus fracture and distal radius fracture and presents here for further evaluation. She was disposition to a skilled nursing facility from the emergency room. The patient describes a aching, throbbing pain that does radiate. The symptoms are constant  and are show no change since the onset. The symptoms do not show a typical rotator cuff provacative pattern. The pain is diffuse about the shoulder. There is not associated mechanical symptoms localizing to the shoulder. The patient admits to symptoms of instability about the shoulder. Treatment to date has included Sling immobilization. Pain levels 10. The symptoms do not correlate with head and neck movement. The patient admits to new onset weakness of the upper extremity. The patient does not have history of prior shoulder trauma. There is no history or autoimmune disease, inflammatory arthropathy or crystal arthropathy. Pain localizes diffusely about the wrist and is not associated with mechanical symptoms. The patient admits to subjective instability about the wrist and admits to new onset weakness of the upper extremity. Pain level 10    The patient admits to a pattern of activity related swelling. Treatment to date: wrist splint which is Mildly effective along with sling immobilization    There is nono prior history of wrist trauma.     There is no prior history of autoimmune disease, inflammatory arthropathy, or crystal arthropathy. Past Medical History:        Past Medical History:   Diagnosis Date    Anemia     CAD (coronary artery disease)     Glaucoma     High blood pressure     Myocardial infarction [facility])          Past Surgical History:   Procedure Laterality Date    APPENDECTOMY      HIP ARTHROPLASTY Right 05/25/2017    RIGHT BIPOLAR HIP HEMIARTHROPLASTY        PACEMAKER INSERTION           Present Medications:         Current Outpatient Medications   Medication Sig Dispense Refill    levothyroxine (SYNTHROID) 50 MCG tablet Take 50 mcg by mouth Daily      lisinopril (PRINIVIL;ZESTRIL) 2.5 MG tablet       warfarin (COUMADIN) 1 MG tablet Take 3 mg by mouth daily       latanoprost (XALATAN) 0.005 % ophthalmic solution Place 1 drop into both eyes nightly       nitroGLYCERIN (NITROSTAT) 0.4 MG SL tablet Place 0.4 mg under the tongue every 5 minutes as needed for Chest pain Dissolve 1 tab under tongue at first sign of chest pain. May repeat every 5 minutes until relief is obtained. If pain persists after taking 3 tabs in a 15-minute period, or the pain is different than is typically experienced, call 9-1-1 immediately.  metoprolol succinate (TOPROL XL) 25 MG extended release tablet Take 25 mg by mouth daily       No current facility-administered medications for this visit. Allergies: Allergies   Allergen Reactions    Pcn [Penicillins] Hives        Social History:         Social History     Socioeconomic History    Marital status:      Spouse name: Not on file    Number of children: Not on file    Years of education: Not on file    Highest education level: Not on file   Occupational History    Occupation: Retired   Tobacco Use    Smoking status: Never Smoker    Smokeless tobacco: Not on file   Substance and Sexual Activity    Alcohol use:  Yes     Alcohol/week: 0.0 standard drinks     Comment: occasionally  Drug use: No    Sexual activity: Not on file   Other Topics Concern    Not on file   Social History Narrative    Not on file     Social Determinants of Health     Financial Resource Strain:     Difficulty of Paying Living Expenses: Not on file   Food Insecurity:     Worried About Running Out of Food in the Last Year: Not on file    Oz of Food in the Last Year: Not on file   Transportation Needs:     Lack of Transportation (Medical): Not on file    Lack of Transportation (Non-Medical): Not on file   Physical Activity:     Days of Exercise per Week: Not on file    Minutes of Exercise per Session: Not on file   Stress:     Feeling of Stress : Not on file   Social Connections:     Frequency of Communication with Friends and Family: Not on file    Frequency of Social Gatherings with Friends and Family: Not on file    Attends Tenri[employer] Services: Not on file    Active Member of [address] [place] [address] [employer] or Organizations: Not on file    Attends Club or Organization Meetings: Not on file    Marital Status: Not on file   Intimate Partner Violence:     Fear of Current or Ex-Partner: Not on file    Emotionally Abused: Not on file    Physically Abused: Not on file    Sexually Abused: Not on file   Housing Stability:     Unable to Pay for Housing in the Last Year: Not on file    Number of [place] in the Last Year: Not on file    Unstable Housing in the Last Year: Not on file        Review of Symptoms:    Pertinent items are noted in HPI    Review of systems reviewed from Patient History Form dated on today's date and   available in the patient's chart under the Media tab. Vital Signs: There were no vitals filed for this visit. General Exam:     Constitutional: Patient is adequately groomed with no evidence of malnutrition  Mental Status: The patient is oriented to time, place and person. The patient's mood and affect are appropriate. Vascular: Examination reveals no swelling or calf tenderness. Peripheral pulses are palpable and 2+. Lymphatics: no lymphadenopathy of the cervical or axillary regions or upper extremity      Physical Exam: left shoulder     General: Thin and frail appearing in no acute distress  Modified exam performed in wheelchair   Primary Exam:    Inspection: No fracture blistering about the shoulder mild effusion      Palpation: There is diffuse tenderness without crepitation      Range of Motion: Purposely not assessed      Strength: Purposely not assessed at the shoulder      Skin: There are no rashes, ulcerations or lesions. Gait: Not assessed      Reflex intact upper     Additional Comments:        Additional Examinations:           Right Upper Extremity:  Examination of the right upper extremity does not show any tenderness, deformity or injury. Range of motion is unremarkable. There is no gross instability. There are no rashes, ulcerations or lesions. Strength and tone are normal.     Primary Exam: Left wrist/left elbow    Inspection: There is bony prominence of the thumb CMC and significant flexion contracture of the long finger      Palpation: There is diffuse tenderness of the wrist; mild diffuse tenderness about the elbow      Range of Motion: Wrist-Limited secondary to pain; elbow-near full range low-grade discomfort    Strength: Wrist-submaximal resisted normal at the digits low-grade discomfort      Skin: There are no rashes, ulcerations or lesions. Gait: Purposely not assessed     Additional Comments:        Additional Examinations:              Office Imaging Results/Procedures PerformedToday:      Radiology:      X-rays obtained and reviewed in office:   Views 3 views left shoulder   Location left shoulder   Impression there is evidence of proximal humerus fracture at the surgical neck with anterior inferior displacement of the humeral head.   There is approximately 50% inferior displacement of the humeral head and secondary inferior subluxation of the humeral head relative to the glenoid. When compared to original x-rays from December 12 this represents interval displacement of the humeral head component of the fracture. There is mild comminution in the region of the greater tuberosity best appreciated on the scapular Y projection. No evidence of fracture dislocation. 3 views left wrist reveal severely impacted distal radius Colles' type fracture. There is acquired ulnar positive variance and mild volar tilt of the distal articular component. There is advanced first [facility] joint arthropathy. General demineralized appearance of bone. 3 views left elbow radiocapitellar joint is congruent no evidence of discrete fracture. General demineralized appearance of bone. Office Procedures:     Orders Placed This Encounter   Procedures    XR SHOULDER LEFT (MIN 2 VIEWS)     Standing Status:   Future     Number of Occurrences:   1     Standing Expiration Date:   1/4/2023     Order Specific Question:   Reason for exam:     Answer:   paiin    XR WRIST LEFT (MIN 3 VIEWS)     Standing Status:   Future     Number of Occurrences:   1     Standing Expiration Date:   1/4/2023     Order Specific Question:   Reason for exam:     Answer:   f/u fx    XR ELBOW LEFT (MIN 3 VIEWS)     Standing Status:   Future     Number of Occurrences:   1     Standing Expiration Date:   1/4/2023     Order Specific Question:   Reason for exam:     Answer:   dakota Null MD, Orthopedic Surgery, [facility]     Referral Priority:   Urgent     Referral Type:   Eval and Treat     Referral Reason:   Specialty Services Required     Requested Specialty:   Orthopedic Surgery     Number of Visits Requested:   1    TN APPLY LONG ARM SPLINT    TN CAST SUP LONG ARM SPLINT , ADULT  FIBREGLASS    Breg DLX Shoulder Immobilizer     Patient was prescribed a DLX Shoulder Immobilizer. The left shoulder will require stabilization / immobilization from this orthosis.   The orthosis will assist in protecting the affected area, provide functional support and facilitate healing. The patient was educated and fit by a healthcare professional with expert knowledge and specialization in brace application while under the direct supervision of the treating physician. Verbal and written instructions for the use of and application of this item were provided. They were instructed to contact the office immediately should the brace result in increased pain, decreased sensation, increased swelling or worsening of the condition. Other Outside Imaging and Testing Personally Reviewed:    XR ELBOW LEFT (MIN 3 VIEWS)    Result Date: 1/4/2022  Radiology exam is complete. No Radiologist dictation. Please follow up with ordering provider. XR WRIST LEFT (MIN 3 VIEWS)    Result Date: 1/4/2022  Radiology exam is complete. No Radiologist dictation. Please follow up with ordering provider. XR SHOULDER LEFT (MIN 2 VIEWS)    Result Date: 1/4/2022  Radiology exam is complete. No Radiologist dictation. Please follow up with ordering provider.       EXAMINATION:   2 XRAY VIEWS OF THE RIGHT HAND       12/13/2021 2:12 pm       COMPARISON:   None.       HISTORY:   ORDERING SYSTEM PROVIDED HISTORY: pain s/p fall, baseline contractures   TECHNOLOGIST PROVIDED HISTORY:   Reason for exam:->pain s/p fall, baseline contractures   Reason for Exam: baseline contractures, pain s/p fall       FINDINGS:   Evaluation of the 3rd through 5th phalanges is suboptimal, secondary to   significant contracture deformities. Rowan Thorneler is no evidence of fracture or   dislocation.  Osteo-degenerative changes are noted at the 1st   carpometacarpal, as well as multiple interphalangeal joints, with osteophyte   formation present.  There is lateral subluxation of the base of the 1st   metacarpal bone, most likely degenerative in nature.  Diffuse osteopenia is   seen.  No other significant osseous abnormality is appreciated.  Extensive triangular fibrocartilage calcifications are noted.           Impression   Evaluation of the 3rd through 5th phalanges is suboptimal, as described   above.  No fracture or dislocation.  Osteo-degenerative changes, as described   above.  Diffuse osteopenia.  Triangular fibrocartilage calcifications.               EXAMINATION:   THREE XRAY VIEWS OF THE LEFT SHOULDER       12/12/2021 7:20 am       COMPARISON:   None.       HISTORY:   ORDERING SYSTEM PROVIDED HISTORY: trauma,   TECHNOLOGIST PROVIDED HISTORY:   Reason for exam:->trauma,   Reason for Exam: Fall       FINDINGS:   There is a comminuted fracture of the left humeral neck/head.  There is   inferior subluxation of the humeral head in relation to the glenoid.  There   is decreased bone mineralization.  The acromioclavicular joint is intact. The visualized lung is clear.           Impression   Comminuted fracture of the left humeral head/neck.       Inferior subluxation of the humeral head in relation to the glenoid.                 Assessment   Impression: . Encounter Diagnoses   Name Primary?  Displaced fracture of proximal end of left humerus     Closed Colles' fracture of left radius, initial encounter     Acute pain of left shoulder     Acute pain of left wrist     Pain in left elbow               Plan:     Surgical consultation Dr. Aly Helton consideration/evaluation for R TSA versus TSA as a treatment option and ORIF of the left distal radius fracture  Preoperative risk assessment with her cardiologist as recommended  Medical pain management as per previous   Sugar tong splint application and sling immobilization left wrist and shoulder    The nature of the finding, probable diagnosis and likely treatment was thoroughly discussed with the patient. The options, risks, complications, alternative treatment as well as some of the differential diagnosis was discussed. The patient was thoroughly informed and all questions were answered.  the patient indicated understanding and satisfaction with the discussion. Approximately 45  minutes was spent related to previewing pertinent medical documentation prior to the patient's visit along with counseling during the patient's visit with respect to treatment options inclusive of alternatives to treatment and the complications and risks related to those treatment options along with expectations of outcome related to those treatments and inclusive of time in the documentation and ordering of testing and treatment after the visit. Orders:        Orders Placed This Encounter   Procedures    XR SHOULDER LEFT (MIN 2 VIEWS)     Standing Status:   Future     Number of Occurrences:   1     Standing Expiration Date:   1/4/2023     Order Specific Question:   Reason for exam:     Answer:   paiin    XR WRIST LEFT (MIN 3 VIEWS)     Standing Status:   Future     Number of Occurrences:   1     Standing Expiration Date:   1/4/2023     Order Specific Question:   Reason for exam:     Answer:   f/u fx    XR ELBOW LEFT (MIN 3 VIEWS)     Standing Status:   Future     Number of Occurrences:   1     Standing Expiration Date:   1/4/2023     Order Specific Question:   Reason for exam:     Answer:   pain   Bishop Reshma MD, Orthopedic Surgery, [facility]     Referral Priority:   Urgent     Referral Type:   Eval and Treat     Referral Reason:   Specialty Services Required     Requested Specialty:   Orthopedic Surgery     Number of Visits Requested:   1    NM APPLY LONG ARM SPLINT    NM CAST SUP LONG ARM SPLINT , ADULT  FIBREGLASS    Breg DLX Shoulder Immobilizer     Patient was prescribed a DLX Shoulder Immobilizer. The left shoulder will require stabilization / immobilization from this orthosis. The orthosis will assist in protecting the affected area, provide functional support and facilitate healing.     The patient was educated and fit by a healthcare professional with expert knowledge and specialization in brace application while under the direct supervision of the treating physician. Verbal and written instructions for the use of and application of this item were provided. They were instructed to contact the office immediately should the brace result in increased pain, decreased sensation, increased swelling or worsening of the condition. Disclaimer: \"This note was dictated with voice recognition software. Though review and correction are routine, we apologize for any errors. \"

## 2022-01-10 ENCOUNTER — OFFICE VISIT (OUTPATIENT)
Dept: ORTHOPEDIC SURGERY | Age: 87
End: 2022-01-10
Payer: MEDICARE

## 2022-01-10 VITALS — HEIGHT: 60 IN | BODY MASS INDEX: 22.58 KG/M2 | WEIGHT: 115 LBS

## 2022-01-10 DIAGNOSIS — S52.532A CLOSED COLLES' FRACTURE OF LEFT RADIUS, INITIAL ENCOUNTER: ICD-10-CM

## 2022-01-10 DIAGNOSIS — M25.522 PAIN IN LEFT ELBOW: Primary | ICD-10-CM

## 2022-01-10 DIAGNOSIS — S42.202A DISPLACED FRACTURE OF PROXIMAL END OF LEFT HUMERUS: ICD-10-CM

## 2022-01-10 DIAGNOSIS — M25.532 ACUTE PAIN OF LEFT WRIST: ICD-10-CM

## 2022-01-10 PROCEDURE — G8420 CALC BMI NORM PARAMETERS: HCPCS | Performed by: PHYSICIAN ASSISTANT

## 2022-01-10 PROCEDURE — 1111F DSCHRG MED/CURRENT MED MERGE: CPT | Performed by: PHYSICIAN ASSISTANT

## 2022-01-10 PROCEDURE — L3670 SO ACRO/CLAV CAN WEB PRE OTS: HCPCS | Performed by: PHYSICIAN ASSISTANT

## 2022-01-10 PROCEDURE — G8427 DOCREV CUR MEDS BY ELIG CLIN: HCPCS | Performed by: PHYSICIAN ASSISTANT

## 2022-01-10 PROCEDURE — 1090F PRES/ABSN URINE INCON ASSESS: CPT | Performed by: PHYSICIAN ASSISTANT

## 2022-01-10 PROCEDURE — 99203 OFFICE O/P NEW LOW 30 MIN: CPT | Performed by: PHYSICIAN ASSISTANT

## 2022-01-10 PROCEDURE — 4040F PNEUMOC VAC/ADMIN/RCVD: CPT | Performed by: PHYSICIAN ASSISTANT

## 2022-01-10 PROCEDURE — 1123F ACP DISCUSS/DSCN MKR DOCD: CPT | Performed by: PHYSICIAN ASSISTANT

## 2022-01-10 PROCEDURE — G8484 FLU IMMUNIZE NO ADMIN: HCPCS | Performed by: PHYSICIAN ASSISTANT

## 2022-01-10 PROCEDURE — 1036F TOBACCO NON-USER: CPT | Performed by: PHYSICIAN ASSISTANT

## 2022-01-10 NOTE — LETTER
Physical Therapy Rehabilitation Referral    Patient Name:  Silvana Fong      YOB: 1928    Diagnosis:    1. Pain in left elbow    2. Displaced fracture of proximal end of left humerus    3. Closed Colles' fracture of left radius, initial encounter    4. Acute pain of left wrist        Precautions:     [x] Evaluate and Treat    Post Op Instructions:  [] Continuous passive motion (CPM) [x] passive Elbow ROM  [] Exercise in plane of scapula  []  Strengthening     [] Pulley and instruction   [x] Home exercise program (copy to patient)   [] Sling when arm at risk  [x] Sling or brace at all times   [] AAROM: Forward elevation to  140            [] AAROM: External rotation  To  40    [] Isometric external rotator strengthening [] AAROM: internal rotation: up the back  [x] Isometric abductor strengthening  [] AAROM: Internal abduction   [] Isometric internal rotator strengthening [] AAROM: cross-body adduction             Stretching:     Strengthening:  [] Four quadrant (FE, ER, IR, CBA)  [] Rotator cuff (ER, IR, Abd)  [] Forward Elevation    [] External Rotators     [] External Rotation    [] Internal Rotators  [] Internal Rotation: up/back   [] Abductors     [] Internal Rotation: supine in abduction  [] Sleeper Stretch    [] Flexors  [] Cross-body abduction    [] Extensors  [] Pendulum (FE, Abd/Add, cw/ccw)  [x] Scapular Stabilizers   [] Wall-walking (FE, Abd)        [x] Shoulder shrugs     [] Table slides (FE)                [x] Rhomboid pinch  [] Elbow (flex, ext, pron, sup)        [] Lat.  Pull downs     [] Medial epicondylitis program       [] Forward punch   [] Lateral epicondylitis program       [] Internal rotators     [] Progressive resistive exercises  [] Bench Press        [] Bench press plus  Activities:     [] Lateral pull-downs  [] Rowing     [] Progressive two-hand supine press  [] Stepper/Exercise bike   [] Biceps: curls/supination  [] Swimming  [] Water exercises    Modalities:     Return to Sport:  [x] Of Choice      [] Plyometrics  [] Ultrasound     [] Rhythmic stabilization  [] Iontophoresis    [] Core strengthening   [] Moist heat     [] Sports specific program:   [] Massage         [x] Cryotherapy      [] Electrical stimulation     [] Paraffin  [] Whirlpool  [] TENS    [x] Home exercise program (copy to patient). Perform exercises for:   15     minutes    3      times/day  [x] Supervised physical therapy  Frequency: []  1x week  [x] 2x week  [] 3x week  [] Other:   Duration: [] 2 weeks   [] 4 weeks  [x] 6 weeks  [] Other:     Additional Instructions: She is to maintain strict nonweightbearing status. Keep the sling on at all times. She may work on gait retraining, transfers and ambulation    Angel Luis Hansen MD, PhD

## 2022-01-10 NOTE — LETTER
[facility]  [address] [facility] [address]  Phone: [phone]  Fax: [phone]    Julián Estimable        January 10, 2022     Patient: Caroline Sepulveda   YOB: 1928   Date of Visit: 1/10/2022       To Whom it May Concern:    Mayelin Brennan was seen in my clinic on 1/10/2022. This patient unfortunately sustained a mechanical fall in December 12th, 2021. She sustained a displaced proximal humerus fracture along with a displaced distal radial fracture. Today she was fitted with a UltraSling brace without the abduction pillow for the left shoulder. She is to remain strict nonweightbearing with transfers and with assistive devices. She may continue to get physical therapy to help with gait retraining, ambulation and lower extremity strengthening. She may also benefit from occupational therapy to help with feeding and dressing. We will have her meet with Dr. Catherine felton who is a shoulder orthopedic specialist to discuss surgical options for her left shoulder including a reverse total shoulder arthroplasty. If you have any questions or concerns, please don't hesitate to call [phone].     Sincerely,     Flo Mi PA-C  Orthopaedic Sports Medicine Physician Assistant

## 2022-01-11 ENCOUNTER — TELEPHONE (OUTPATIENT)
Dept: ORTHOPEDIC SURGERY | Age: 87
End: 2022-01-11

## 2022-01-11 DIAGNOSIS — M25.532 LEFT WRIST PAIN: ICD-10-CM

## 2022-01-11 DIAGNOSIS — M25.512 LEFT SHOULDER PAIN, UNSPECIFIED CHRONICITY: Primary | ICD-10-CM

## 2022-01-11 DIAGNOSIS — S52.532A CLOSED COLLES' FRACTURE OF LEFT RADIUS, INITIAL ENCOUNTER: Primary | ICD-10-CM

## 2022-01-11 NOTE — TELEPHONE ENCOUNTER
Dr Eryn Perry would like to see the patient in office this week (add to surgery schedule on Monday 1-17-22 if possible). Princess Isaac, patient's granddaughter, is coordinating appointments along with the patient's mother. Transport needs to be arranged and Princess Isaac did not think the 13th was an option for them. If transport can be arranged for the 13th to the  office, she will call back to reschedule. Explained to her that the office will overbook for this (upon return call it is OK to overbook).  Princess Isaac stated they are unsure if they feel the patient should have surgery and that she may not get clearance from her cardiologist.

## 2022-01-11 NOTE — PROGRESS NOTES
[facility]  History and Physical  Shoulder Pain    Date:  2022    Name:  Lizbet Veloz  Address:  [place] [address]    :  3/2/1928      Age:   80 y.o.    SSN:  [id number]      Medical Record Number:  [id number]    Reason for Visit:    New Patient (L arm)      HPI:   Lizbet Veloz is a 80 y.o. female who presents to our office today complaining of  left upper extremity pain. She is right-handed and was referred to us by Dr. Nina Barrientos. She presents to our office with her daughter and granddaughter who was present throughout the entire visit. Patient is in a wheelchair with her left arm in a sling. Patient unfortunately seen a mechanical fall in 2021 in her own home. Fall was not witnessed. She lives with her adult daughter who did take her to the emergency room where she was hospitalized for few days. She was found to have a displaced proximal humerus fracture and a displaced distal radius fracture. This discharged to a skilled nursing home facility from the hospital.  She has continued to convalesce over there. She does have a fair amount of pain and has been receiving some medication at the facility. Prior to this injury she was utilizing a walker anytime she was ambulating. Dr. Nina Barrientos did splint her left wrist and placed her in a sling. Denies numbness or tingling today. Review of Systems:  A 14 point review of systems available in the scanned medical record as documented by the patient. The review is negative with the exception of those things mentioned in the History of Present Illness and Past Medical History.       Past History:  Past Medical History:   Diagnosis Date    Anemia     CAD (coronary artery disease)     Glaucoma     High blood pressure     Myocardial infarction [facility])      Past Surgical History:   Procedure Laterality Date    APPENDECTOMY      HIP ARTHROPLASTY Right 05/25/2017    RIGHT BIPOLAR HIP HEMIARTHROPLASTY        PACEMAKER INSERTION       Current Outpatient Medications on File Prior to Visit   Medication Sig Dispense Refill    levothyroxine (SYNTHROID) 50 MCG tablet Take 50 mcg by mouth Daily      lisinopril (PRINIVIL;ZESTRIL) 2.5 MG tablet       warfarin (COUMADIN) 1 MG tablet Take 3 mg by mouth daily       latanoprost (XALATAN) 0.005 % ophthalmic solution Place 1 drop into both eyes nightly       nitroGLYCERIN (NITROSTAT) 0.4 MG SL tablet Place 0.4 mg under the tongue every 5 minutes as needed for Chest pain Dissolve 1 tab under tongue at first sign of chest pain. May repeat every 5 minutes until relief is obtained. If pain persists after taking 3 tabs in a 15-minute period, or the pain is different than is typically experienced, call 9-1-1 immediately.  metoprolol succinate (TOPROL XL) 25 MG extended release tablet Take 25 mg by mouth daily       No current facility-administered medications on file prior to visit. Social History     Socioeconomic History    Marital status:      Spouse name: Not on file    Number of children: Not on file    Years of education: Not on file    Highest education level: Not on file   Occupational History    Occupation: Retired   Tobacco Use    Smoking status: Never Smoker    Smokeless tobacco: Not on file   Substance and Sexual Activity    Alcohol use: Yes     Alcohol/week: 0.0 standard drinks     Comment: occasionally    Drug use: No    Sexual activity: Not on file   Other Topics Concern    Not on file   Social History Narrative    Not on file     Social Determinants of Health     Financial Resource Strain:     Difficulty of Paying Living Expenses: Not on file   Food Insecurity:     Worried About Running Out of Food in the Last Year: Not on file    Oz of Food in the Last Year: Not on file   Transportation Needs:     Lack of Transportation (Medical):  Not on file    Lack of Transportation (Non-Medical): Not on file   Physical Activity:     Days of Exercise per Week: Not on file    Minutes of Exercise per Session: Not on file   Stress:     Feeling of Stress : Not on file   Social Connections:     Frequency of Communication with Friends and Family: Not on file    Frequency of Social Gatherings with Friends and Family: Not on file    Attends Religion Services: Not on file    Active Member of [address] [place] [address] or Organizations: Not on file    Attends Club or Organization Meetings: Not on file    Marital Status: Not on file   Intimate Partner Violence:     Fear of Current or Ex-Partner: Not on file    Emotionally Abused: Not on file    Physically Abused: Not on file    Sexually Abused: Not on file   Housing Stability:     Unable to Pay for Housing in the Last Year: Not on file    Number of [place] in the Last Year: Not on file    Unstable Housing in the Last Year: Not on file     History reviewed. No pertinent family history. Current Medications:    Current Outpatient Medications   Medication Sig Dispense Refill    levothyroxine (SYNTHROID) 50 MCG tablet Take 50 mcg by mouth Daily      lisinopril (PRINIVIL;ZESTRIL) 2.5 MG tablet       warfarin (COUMADIN) 1 MG tablet Take 3 mg by mouth daily       latanoprost (XALATAN) 0.005 % ophthalmic solution Place 1 drop into both eyes nightly       nitroGLYCERIN (NITROSTAT) 0.4 MG SL tablet Place 0.4 mg under the tongue every 5 minutes as needed for Chest pain Dissolve 1 tab under tongue at first sign of chest pain. May repeat every 5 minutes until relief is obtained. If pain persists after taking 3 tabs in a 15-minute period, or the pain is different than is typically experienced, call 9-1-1 immediately.  metoprolol succinate (TOPROL XL) 25 MG extended release tablet Take 25 mg by mouth daily       No current facility-administered medications for this visit. Allergies:   Allergies   Allergen Reactions    Pcn [Penicillins] Hives Physical Exam:  There were no vitals filed for this visit. General: Mariana Price is a healthy and well appearing 80 y.o. female who is sitting comfortably in our office in acute distress. General Exam:   Constitutional: Patient is adequately groomed with no evidence of malnutrition  DTRs: Deep tendon reflexes are intact  Mental Status: The patient is oriented to time, place and person. The patient's mood and affect are appropriate. Lymphatic: The lymphatic examination bilaterally reveals all areas to be without enlargement or induration. Vascular: Examination reveals no swelling or calf tenderness. Peripheral pulses are palpable and 2+. Neurological: The patient has good coordination. There is no weakness or sensory deficit. Neuro: alert. Oriented X 3  Eyes: Extra-ocular muscles intact  Mouth: Oral mucosa moist. No perioral lesions  Pulm: Respirations unlabored and regular. left upper extremity exam:  Inspection: Swelling of the left upper extremity is present. Ecchymosis although present throughout the entire, no signs of infection. Palpation:diffused tenderness present. Active Range of Motion: deferred due to trauma  Passive Range of Motion: deferred due to trauma    Strength:  deferred due to trauma    Neurovascular: Sensation to light touch is intact, no motor deficits, palpable radial pulses 2+    Additional Examinations:    Examination of the contralateral extremity does not show any tenderness, deformity or injury. Range of motion is unremarkable. There is no gross instability. There are no rashes, ulcerations or lesions. Strength and tone are normal.    Laboratory:  No visits with results within 14 Day(s) from this visit.    Latest known visit with results is:   Admission on 12/12/2021, Discharged on 12/14/2021   Component Date Value    WBC 12/12/2021 6.7     RBC 12/12/2021 4.70     Hemoglobin 12/12/2021 15.8     Hematocrit 12/12/2021 47.1     MCV 12/12/2021 100.2*    MCH 12/12/2021 33.6     MCHC 12/12/2021 33.6     RDW 12/12/2021 14.4     Platelets 59/91/9289 209     MPV 12/12/2021 9.0     Neutrophils % 12/12/2021 52.6     Lymphocytes % 12/12/2021 39.8     Monocytes % 12/12/2021 5.6     Eosinophils % 12/12/2021 1.3     Basophils % 12/12/2021 0.7     Neutrophils Absolute 12/12/2021 3.5     Lymphocytes Absolute 12/12/2021 2.7     Monocytes Absolute 12/12/2021 0.4     Eosinophils Absolute 12/12/2021 0.1     Basophils Absolute 12/12/2021 0.0     Rejected Test 12/12/2021 bmpx     Reason for Rejection 12/12/2021 see below     Sodium 12/12/2021 138     Potassium reflex Magnesi* 12/12/2021 4.3     Chloride 12/12/2021 105     CO2 12/12/2021 21     Anion Gap 12/12/2021 12     Glucose 12/12/2021 133*    BUN 12/12/2021 18     CREATININE 12/12/2021 0.7     GFR Non- 12/12/2021 >60     GFR  12/12/2021 >60     Calcium 12/12/2021 9.5     ABO/Rh 12/12/2021 B NEG     Antibody Screen 12/12/2021 NEG     Protime 12/12/2021 24.5*    INR 12/12/2021 2.10*    Sodium 12/13/2021 137     Potassium 12/13/2021 4.4     Chloride 12/13/2021 103     CO2 12/13/2021 20*    Anion Gap 12/13/2021 14     Glucose 12/13/2021 117*    BUN 12/13/2021 17     CREATININE 12/13/2021 0.6     GFR Non- 12/13/2021 >60     GFR  12/13/2021 >60     Calcium 12/13/2021 9.5     WBC 12/13/2021 11.1*    RBC 12/13/2021 4.00     Hemoglobin 12/13/2021 13.2     Hematocrit 12/13/2021 40.0     MCV 12/13/2021 100.0     MCH 12/13/2021 33.0     MCHC 12/13/2021 33.0     RDW 12/13/2021 14.6     Platelets 83/67/4254 182     MPV 12/13/2021 8.4     Neutrophils % 12/13/2021 81.2     Lymphocytes % 12/13/2021 12.6     Monocytes % 12/13/2021 5.4     Eosinophils % 12/13/2021 0.1     Basophils % 12/13/2021 0.7     Neutrophils Absolute 12/13/2021 9.0*    Lymphocytes Absolute 12/13/2021 1.4     Monocytes Absolute 12/13/2021 0.6     Eosinophils Absolute 12/13/2021 0.0     Basophils Absolute 12/13/2021 0.1     Protime 12/13/2021 27.5*    INR 12/13/2021 2.35*    Sodium 12/14/2021 137     Potassium 12/14/2021 4.1     Chloride 12/14/2021 104     CO2 12/14/2021 24     Anion Gap 12/14/2021 9     Glucose 12/14/2021 110*    BUN 12/14/2021 22*    CREATININE 12/14/2021 0.6     GFR Non- 12/14/2021 >60     GFR  12/14/2021 >60     Calcium 12/14/2021 9.2     WBC 12/14/2021 6.3     RBC 12/14/2021 3.43*    Hemoglobin 12/14/2021 11.7*    Hematocrit 12/14/2021 34.5*    MCV 12/14/2021 100.4*    MCH 12/14/2021 34.0     MCHC 12/14/2021 33.9     RDW 12/14/2021 14.5     Platelets 86/24/3021 163     MPV 12/14/2021 8.1     Neutrophils % 12/14/2021 62.4     Lymphocytes % 12/14/2021 28.2     Monocytes % 12/14/2021 8.3     Eosinophils % 12/14/2021 0.8     Basophils % 12/14/2021 0.3     Neutrophils Absolute 12/14/2021 3.9     Lymphocytes Absolute 12/14/2021 1.8     Monocytes Absolute 12/14/2021 0.5     Eosinophils Absolute 12/14/2021 0.1     Basophils Absolute 12/14/2021 0.0       No results found for this or any previous visit (from the past 24 hour(s)). Radiographic:  1 xray views of the left  shoulder including AP were taken in our office today reveals completely displaced proximal humerus fracture at the neck. We also took 2 views of the wrist including AP and lateral which shows a displaced distal radius fracture. 2 views of the left elbow did not show any acute bony abnormalities. Self assessment questionnaires including ASES and Simple Shoulder Test were completed today. Assessment:  Josephine Gonzalez is a 80 y.o. female who presents her office with her daughter and granddaughter unfortunately sustained a fall on December 12, 2021 sustaining a displaced proximal humerus fracture and a displaced distal radius fracture of the left upper extremity.     Impression:  Encounter Diagnoses Name Primary?  Pain in left elbow Yes    Displaced fracture of proximal end of left humerus     Closed Colles' fracture of left radius, initial encounter     Acute pain of left wrist        Office Procedures:  Orders Placed This Encounter   Procedures    XR ELBOW LEFT (2 VIEWS)     Standing Status:   Future     Number of Occurrences:   1     Standing Expiration Date:   1/10/2023     Order Specific Question:   Reason for exam:     Answer:   pain    DJO ultrasling IV Shoulder Sling     Patient was prescribed a DJO Ultrasling IV Shoulder Brace. The left shoulder will require stabilization / immobilization from this orthosis. The orthosis will assist in protecting the affected area, provide functional support and facilitate healing. The device was ordered and fit on 01/10/2022. The patient was educated and fit by a healthcare professional with expert knowledge and specialization in brace application while under the direct supervision of the treating physician. Verbal and written instructions for the use of and application of this item were provided. They were instructed to contact the office immediately should the brace result in increased pain, decreased sensation, increased swelling or worsening of the condition. Plan:   The splint that Dr. Elisa Ellis placed was left alone. We did fit her with an UltraSling brace today. We took the abduction pillow off. We will refer her to Dr. Sheron Jiang for surgical consultation for her left wrist.  And that she keep the splint and the sling on at all times. She is to maintain strict nonweightbearing status. We will have her get a CT scan of the left shoulder for surgical planning. Likely she will require a reverse total shoulder arthroplasty. We did discuss this surgery a little bit today. I will have her meet with Dr. Jaxon Gross to discuss the reverse shoulder arthroplasty in more detail along with the results of the CT scan.         1/11/2022  12:48 ELIAZAR Hurley PA-C  Orthopaedic Sports Medicine Physician Assistant        This dictation was performed with a verbal recognition program [facility]) and it was checked for errors. It is possible that there are still dictated errors within this office note. If so, please bring any errors to my attention for an addendum. All efforts were made to ensure that this office note is accurate.

## 2022-01-12 ENCOUNTER — TELEPHONE (OUTPATIENT)
Dept: ORTHOPEDIC SURGERY | Age: 87
End: 2022-01-12

## 2022-01-12 NOTE — TELEPHONE ENCOUNTER
LVM. Asked Deysi if transport can be arranged for an office appointment tomorrow (1-13-22) instead of 1-18-22 (per physician request). Asked for Deysi to call the office back to discuss things further.

## 2022-01-17 ENCOUNTER — TELEPHONE (OUTPATIENT)
Dept: ORTHOPEDIC SURGERY | Age: 87
End: 2022-01-17

## 2022-01-17 NOTE — TELEPHONE ENCOUNTER
Returned call to [facility]. They wanted to know if they should still proceed with the shoulder CT. Let them know this was ordered by Dr. Kingsley Dunbar, not Dr. Ayan Diallo. Dr. Ayan Diallo is only treating patient for the wrist per upcoming appt notes. Kianna Puckett states the daughter wants to proceed with the shoulder CT and that she will pay out of pocket if needed so since they have the order they will just proceed.

## 2022-01-17 NOTE — TELEPHONE ENCOUNTER
Anthony Christensen from [place] [facility] -[phone], she needs someone to call about ct shoulder, ct hand.

## 2022-01-18 ENCOUNTER — TELEPHONE (OUTPATIENT)
Dept: ORTHOPEDIC SURGERY | Age: 87
End: 2022-01-18

## 2022-01-18 ENCOUNTER — OFFICE VISIT (OUTPATIENT)
Dept: ORTHOPEDIC SURGERY | Age: 87
End: 2022-01-18
Payer: MEDICARE

## 2022-01-18 DIAGNOSIS — S52.572A OTHER CLOSED INTRA-ARTICULAR FRACTURE OF DISTAL END OF LEFT RADIUS, INITIAL ENCOUNTER: Primary | ICD-10-CM

## 2022-01-18 PROCEDURE — G8428 CUR MEDS NOT DOCUMENT: HCPCS | Performed by: ORTHOPAEDIC SURGERY

## 2022-01-18 PROCEDURE — 1090F PRES/ABSN URINE INCON ASSESS: CPT | Performed by: ORTHOPAEDIC SURGERY

## 2022-01-18 PROCEDURE — G8420 CALC BMI NORM PARAMETERS: HCPCS | Performed by: ORTHOPAEDIC SURGERY

## 2022-01-18 PROCEDURE — 99214 OFFICE O/P EST MOD 30 MIN: CPT | Performed by: ORTHOPAEDIC SURGERY

## 2022-01-18 PROCEDURE — 4040F PNEUMOC VAC/ADMIN/RCVD: CPT | Performed by: ORTHOPAEDIC SURGERY

## 2022-01-18 PROCEDURE — G8484 FLU IMMUNIZE NO ADMIN: HCPCS | Performed by: ORTHOPAEDIC SURGERY

## 2022-01-18 PROCEDURE — 4004F PT TOBACCO SCREEN RCVD TLK: CPT | Performed by: ORTHOPAEDIC SURGERY

## 2022-01-18 PROCEDURE — 1123F ACP DISCUSS/DSCN MKR DOCD: CPT | Performed by: ORTHOPAEDIC SURGERY

## 2022-01-18 NOTE — PROGRESS NOTES
awaiting for return fax from [facility]. spoke to [place] and [place].  [phone]

## 2022-01-18 NOTE — PROGRESS NOTES
Covid testing to be done @  If positive---Pt instructed to notify MD ASAP   If negative--pt was instructed to bring results DOP/DOSPreoperative Screening for Elective Surgery/Invasive Procedures While COVID-19 present in the community     1. Have you tested positive or have been told to self-isolate for COVID-19 like symptoms within the past 28 days?no  2. Do you currently have any of the following symptoms?no  ? Fever >100.0 F or 99.9 F in immunocompromised patients? ? New onset cough, shortness of breath or difficulty breathing? ? New onset sore throat, myalgia (muscle aches and pains), headache, loss of taste/smell or diarrhea? 3. Have you had a potential exposure to COVID-19 within the past 14 days by:no  ? Close contact with a confirmed case? ? Close contact with a healthcare worker,  or essential infrastructure worker (grocery store, [employer], gas station, public utilities or transportation)?no  ? Do you reside in a congregate setting such as; skilled nursing facility, adult home, correctional facility, homeless shelter or other institutional setting? ? Have you had recent travel to a known COVID-19 hotspot? Indicate if the patient has a positive screen by answering yes to one or more of the above questions. [address]  time____________        Surgery time____________    Take the following medications with a sip of water:    Do not eat or drink anything after 12:00 midnight prior to your surgery. This includes water chewing gum, mints and ice chips. You may brush your teeth and gargle the morning of your surgery, but do not swallow the water     Please see your family doctor/pediatrician for a history and physical and/or concerning medications. Bring any test results/reports from your physicians office.    If you are under the care of a heart doctor or specialist doctor, please be aware that you may be asked to them for clearance    You may be asked to stop blood thinners such as Coumadin, Plavix, Fragmin, Lovenox, etc., or any anti-inflammatories such as:  Aspirin, Ibuprofen, Advil, Naproxen prior to your surgery. We also ask that you stop any OTC medications such as fish oil, vitamin E, glucosamine, garlic, Multivitamins, COQ 10, etc.    We ask that you do not smoke 24 hours prior to surgery  We ask that you do not  drink any alcoholic beverages 24 hours prior to surgery     You must make arrangements for a responsible adult to take you home after your surgery. For your safety you will not be allowed to leave alone or drive yourself home. Your surgery will be cancelled if you do not have a ride home. Also for your safety, it is strongly suggested that someone stay with you the first 24 hours after your surgery. A parent or legal guardian must accompany a child scheduled for surgery and plan to stay at the hospital until the child is discharged. Please do not bring other children with you. For your comfort, please wear simple loose fitting clothing to the hospital.  Please do not bring valuables. Do not wear any make-up or nail polish on your fingers or toes      For your safety, please do not wear any jewelry or body piercing's on the day of surgery. All jewelry must be removed. If you have dentures, they will be removed before going to operating room. For your convenience, we will provide you with a container. If you wear contact lenses or glasses, they will be removed, please bring a case for them. If you have a living will and a durable power of  for healthcare, please bring in a copy. As part of our patient safety program to minimize surgical site infections, we ask you to do the following:    · Please notify your surgeon if you develop any illness between         now and the  day of your surgery.     · This includes a cough, cold, fever, sore throat, nausea,         or vomiting, and diarrhea, etc.  ·  Please notify your surgeon if you experience dizziness, shortness         of breath or blurred vision between now and the time of your surgery. Do not shave your operative site 96 hours prior to surgery. For face and neck surgery, men may use an electric razor 48 hours   prior to surgery. You may shower the night before surgery or the morning of   your surgery with an antibacterial soap. You will need to bring a photo ID and insurance card    [facility] has an onsite pharmacy, would you like to utilize our pharmacy     If you will be staying overnight and use a C-pap machine, please bring   your C-pap to hospital     Our goal is to provide you with excellent care, therefore, visitors will be limited to two(2) in the room at a time so that we may focus on providing this care for you. Please contact pre-admission testing if you have any further questions. [facility] phone number:  [phone] PAT fax number:  [phone]  Please note these are generalized instructions for all surgical cases, you may be provided with more specific instructions according to your surgery. [address]  time____________        Surgery time____________    Take the following medications with a sip of water:    Do not eat or drink anything after 12:00 midnight prior to your surgery. This includes water chewing gum, mints and ice chips. You may brush your teeth and gargle the morning of your surgery, but do not swallow the water     Please see your family doctor/pediatrician for a history and physical and/or concerning medications. Bring any test results/reports from your physicians office.    If you are under the care of a heart doctor or specialist doctor, please be aware that you may be asked to them for clearance    You may be asked to stop blood thinners such as Coumadin, Plavix, Fragmin, Lovenox, etc., or any anti-inflammatories such as:  Aspirin, Ibuprofen, Advil, Naproxen prior to your surgery. We also ask that you stop any OTC medications such as fish oil, vitamin E, glucosamine, garlic, Multivitamins, COQ 10, etc.    We ask that you do not smoke 24 hours prior to surgery  We ask that you do not  drink any alcoholic beverages 24 hours prior to surgery     You must make arrangements for a responsible adult to take you home after your surgery. For your safety you will not be allowed to leave alone or drive yourself home. Your surgery will be cancelled if you do not have a ride home. Also for your safety, it is strongly suggested that someone stay with you the first 24 hours after your surgery. A parent or legal guardian must accompany a child scheduled for surgery and plan to stay at the hospital until the child is discharged. Please do not bring other children with you. For your comfort, please wear simple loose fitting clothing to the hospital.  Please do not bring valuables. Do not wear any make-up or nail polish on your fingers or toes      For your safety, please do not wear any jewelry or body piercing's on the day of surgery. All jewelry must be removed. If you have dentures, they will be removed before going to operating room. For your convenience, we will provide you with a container. If you wear contact lenses or glasses, they will be removed, please bring a case for them. If you have a living will and a durable power of  for healthcare, please bring in a copy. As part of our patient safety program to minimize surgical site infections, we ask you to do the following:    · Please notify your surgeon if you develop any illness between         now and the  day of your surgery.     · This includes a cough, cold, fever, sore throat, nausea,         or vomiting, and diarrhea, etc.  ·  Please notify your surgeon if you experience dizziness, shortness         of breath or blurred vision between now and the time of your surgery. Do not shave your operative site 96 hours prior to surgery. For face and neck surgery, men may use an electric razor 48 hours   prior to surgery. You may shower the night before surgery or the morning of   your surgery with an antibacterial soap. You will need to bring a photo ID and insurance card    [facility] has an onsite pharmacy, would you like to utilize our pharmacy     If you will be staying overnight and use a C-pap machine, please bring   your C-pap to hospital     Our goal is to provide you with excellent care, therefore, visitors will be limited to two(2) in the room at a time so that we may focus on providing this care for you. Please contact pre-admission testing if you have any further questions. [facility] phone number:  [phone] PAT fax number:  [phone]  Please note these are generalized instructions for all surgical cases, you may be provided with more specific instructions according to your surgery.

## 2022-01-18 NOTE — LETTER
[facility]  Surgery Scheduling Form:      DEMOGRAPHICS:                                                                                                                  Patient Name:  Ryan Eden  Patient :  3/2/1928   Patient SS#:      Patient Phone:  [phone] (home)  Alt. Patient Phone:    Patient Address:  [address] [address]    PCP:  Violet Martel MD    Insurance ID Number:  Payor/Plan Subscr  Sex Relation Sub. Ins. ID Effective Group Num   1. [address] 3/2/1928 Female Self [id number]                                   [address] [address]         DIAGNOSIS & PROCEDURE:                                                                                                Diagnosis:   Left Distal radius fracture S52.502A  Operation:  Open reduction internal fixation Left distal radius [address]  Location: [place]  Provider:  Jonah Mcmullen MD      [facility] INFORMATION:                                                                                         .    Requested Date:  22    OR Time:  9:15                      Patient Arrival Time:  7:15  OR Time Required:  30 Minutes  Anesthesia:  Regional block  Equipment:  Natalee  Mini C-Arm:  Yes   Standard C-Arm:  No  Status:  Outpatient  PAT Required:  Yes  Comments: COVID + 22            Melita Mcmullen MD     22         Pre Operative Physician Prophylaxis Orders - SCIP Protocols      Patient: Ryan Eden  :    3/2/1928    Procedure: 22 Open reduction internal fixation Left distal radius [facility] patient notified to have test done and bring results    HEIGHT:  Ht Readings from Last 1 Encounters:   01/10/22 5' (1.524 m)                      WEIGHT:  Wt Readings from Last 1 Encounters:   01/10/22 115 lb (52.2 kg)         History & Physical:  [ X ] By Surgeon  [ ]By PCP  [ Chris Ackley Report     Pre-Operative Antibiotic Order:     []  ----  No Antibiotic Ordered       [x]  ----  Give the following Antibiotic within 1 hour prior to start time:                                                      Cefazolin 2g IV <119.9kg (264lbs) OR 3g if >120kg (898BOP)       or      Clindamycin 900 mg IV (over 1 hour) if patient is allergic to           PENICILLINS or CAPHALOSPORIN       VTE prophylaxis [ ] Thigh hi  TEDS  [ ]  Knee Hi TEDS [ ] Foot Pumps [ ] Compression Devices      Physician Signature:     Date: 1/18/22  Time: 1:09 PM

## 2022-01-18 NOTE — PROGRESS NOTES
CHIEF COMPLAINT: Left wrist pain/ displaced distal radius fracture. DATE OF INJURY: 12/12/2021    HISTORY:  Ms. Saul Dodge is a 80 y.o.  female right handed who presents today for evaluation of a left wrist injury. The patient reports that this injury occurred when she had an unwitnessed fall in her own home. She was first seen and evaluated in [facility], when she was x-rayed and splinted, and asked to f/u with Orthopedics. She was also found to have a left displaced proximal humerus fracture. She was then referred to Dr. Irina Bauman who then referred her to Dr. Claire Salvador for consideration of a left reverse total shoulder. Dr. Claire Salvador then referred her to Dr. Marlin Wang for further consideration for surgical options for the left wrist.  The family has been very hesitant regarding surgical options. Rates pain a 10/10 VAS severe, sharp, aching, constant and show no change. Movement makes the pain worse, the splint and resting makes the pain better. Alleviating factors none. She is taking Ultram at the skilled nursing facility with not much improvement. No numbness or tingling sensation. She is here today with her POA, her daughter. Past Medical History:   Diagnosis Date    Anemia     CAD (coronary artery disease)     Glaucoma     High blood pressure     Myocardial infarction [facility])        Past Surgical History:   Procedure Laterality Date    APPENDECTOMY      HIP ARTHROPLASTY Right 05/25/2017    RIGHT BIPOLAR HIP HEMIARTHROPLASTY        PACEMAKER INSERTION         Social History     Socioeconomic History    Marital status:      Spouse name: Not on file    Number of children: Not on file    Years of education: Not on file    Highest education level: Not on file   Occupational History    Occupation: Retired   Tobacco Use    Smoking status: Never Smoker    Smokeless tobacco: Not on file   Substance and Sexual Activity    Alcohol use:  Yes     Alcohol/week: 0.0 standard drinks     Comment: occasionally    Drug use: No    Sexual activity: Not on file   Other Topics Concern    Not on file   Social History Narrative    Not on file     Social Determinants of Health     Financial Resource Strain:     Difficulty of Paying Living Expenses: Not on file   Food Insecurity:     Worried About Running Out of Food in the Last Year: Not on file    Oz of Food in the Last Year: Not on file   Transportation Needs:     Lack of Transportation (Medical): Not on file    Lack of Transportation (Non-Medical): Not on file   Physical Activity:     Days of Exercise per Week: Not on file    Minutes of Exercise per Session: Not on file   Stress:     Feeling of Stress : Not on file   Social Connections:     Frequency of Communication with Friends and Family: Not on file    Frequency of Social Gatherings with Friends and Family: Not on file    Attends [employer] Services: Not on file    Active Member of [address] [place] [address] [employer] or Organizations: Not on file    Attends Club or Organization Meetings: Not on file    Marital Status: Not on file   Intimate Partner Violence:     Fear of Current or Ex-Partner: Not on file    Emotionally Abused: Not on file    Physically Abused: Not on file    Sexually Abused: Not on file   Housing Stability:     Unable to Pay for Housing in the Last Year: Not on file    Number of [place] in the Last Year: Not on file    Unstable Housing in the Last Year: Not on file       No family history on file.     Current Outpatient Medications on File Prior to Visit   Medication Sig Dispense Refill    levothyroxine (SYNTHROID) 50 MCG tablet Take 50 mcg by mouth Daily      lisinopril (PRINIVIL;ZESTRIL) 2.5 MG tablet       warfarin (COUMADIN) 1 MG tablet Take 3 mg by mouth daily       latanoprost (XALATAN) 0.005 % ophthalmic solution Place 1 drop into both eyes nightly       nitroGLYCERIN (NITROSTAT) 0.4 MG SL tablet Place 0.4 mg under the tongue every 5 minutes as needed for Chest pain Dissolve 1 tab under tongue at first sign of chest pain. May repeat every 5 minutes until relief is obtained. If pain persists after taking 3 tabs in a 15-minute period, or the pain is different than is typically experienced, call 9-1-1 immediately.  metoprolol succinate (TOPROL XL) 25 MG extended release tablet Take 25 mg by mouth daily       No current facility-administered medications on file prior to visit. Pertinent items are noted in HPI  Review of systems reviewed from Patient History Form and available in the patient's chart under the Media tab. PHYSICAL EXAMINATION:  Ms. Airam Velez is a very pleasant 80 y.o.  female who presents today in no acute distress, awake, alert, and oriented. She is well dressed, nourished and  groomed. Patient with normal affect. Height is   , weight is  , There is no height or weight on file to calculate BMI. Resting respiratory rate is 16. On evaluation of her left upper extremity, there is moderate deformity. There is moderate swelling and minimal ecchymosis. She is tender to palpation over the distal radius as well as the left shoulder. Range of motion is decreased secondary to pain over the left wrist, but no mechanical block. The skin overlying the left wrist is intact without evidence of lesion, laceration or abrasion. Distal pulses are 2+ and symmetric bilaterally. Sensation is grossly intact to light touch and symmetric bilaterally. IMAGING:  Xrays dated 1/10/2022, 3 views of left wrist were reviewed, and showed displaced distal radius fracture. IMPRESSION:  Left displaced distal radius fracture.     PLAN:  I discussed with Marguerite Terrell as well as the POA, the daughter and granddaughter was spoken to over the phone the overall alignment of the fracture and treatment options including both surgical and non-surgical treatment, and that my recommendation is an open reduction and internal fixation given the amount of displacement and comminution of the fracture. I discussed the risks and benefits of surgery with the patient, including but not limited to infection, bleeding, pain, injury to nerves or blood vessels failure of the surgery and need for additional surgery. All the patient's questions were answered. We discussed an expected post-operative course. She  is understanding of this and wishes to discuss this with the patient and the other POA's and will let us know if they would like to proceed. If they would like to proceed, we will plan on surgery Monday at [facility].  She will continue in the splint nonweightbearing left arm. We will refer her back to Dr. Lydia Bell for further management of the left shoulder. We will call cardiologist for clearance. We will consider doing the surgery with a block. As this patient has demonstrated risk factors for osteoporosis, such as age greater than [de-identified] years and evidence of a fracture, I have referred the patient back to the primary care physician for evaluation for osteoporosis, including consideration for DEXA scanning, if this is felt to be clinically indicated. The patient is advised to contact the primary care physician to follow-up for further evaluation.        Cristi Banda MD

## 2022-01-18 NOTE — TELEPHONE ENCOUNTER
CPT: [id number]  BODY PART: left wrist  STATUS: outpatient  AUTHORIZATION: approved    Submitted online with [facility] 1/18/22 # [id number]  1/24/22-4/24/22

## 2022-01-18 NOTE — TELEPHONE ENCOUNTER
Phoned Dr Delvalle office for cardiac clearance P# [phone].   They will fax clearance to [phone]

## 2022-01-18 NOTE — PROGRESS NOTES
Spoke with Laura Escobar at [facility] regarding pre op COVID test for patient. She stated she would have it ordered and done before the 24th.

## 2022-01-19 ENCOUNTER — TELEPHONE (OUTPATIENT)
Dept: ORTHOPEDIC SURGERY | Age: 87
End: 2022-01-19

## 2022-01-19 ENCOUNTER — ANESTHESIA EVENT (OUTPATIENT)
Dept: OPERATING ROOM | Age: 87
DRG: 511 | End: 2022-01-19
Payer: MEDICARE

## 2022-01-19 ENCOUNTER — OFFICE VISIT (OUTPATIENT)
Dept: ORTHOPEDIC SURGERY | Age: 87
End: 2022-01-19
Payer: MEDICARE

## 2022-01-19 VITALS — BODY MASS INDEX: 22.58 KG/M2 | HEIGHT: 60 IN | WEIGHT: 115 LBS

## 2022-01-19 DIAGNOSIS — S42.292A OTHER CLOSED DISPLACED FRACTURE OF PROXIMAL END OF LEFT HUMERUS, INITIAL ENCOUNTER: Primary | ICD-10-CM

## 2022-01-19 PROBLEM — S52.532A FRACTURE, COLLES, LEFT, CLOSED: Status: ACTIVE | Noted: 2022-01-19

## 2022-01-19 PROCEDURE — G8484 FLU IMMUNIZE NO ADMIN: HCPCS | Performed by: ORTHOPAEDIC SURGERY

## 2022-01-19 PROCEDURE — G8420 CALC BMI NORM PARAMETERS: HCPCS | Performed by: ORTHOPAEDIC SURGERY

## 2022-01-19 PROCEDURE — 1090F PRES/ABSN URINE INCON ASSESS: CPT | Performed by: ORTHOPAEDIC SURGERY

## 2022-01-19 PROCEDURE — 1036F TOBACCO NON-USER: CPT | Performed by: ORTHOPAEDIC SURGERY

## 2022-01-19 PROCEDURE — G8428 CUR MEDS NOT DOCUMENT: HCPCS | Performed by: ORTHOPAEDIC SURGERY

## 2022-01-19 PROCEDURE — 99214 OFFICE O/P EST MOD 30 MIN: CPT | Performed by: ORTHOPAEDIC SURGERY

## 2022-01-19 PROCEDURE — 1123F ACP DISCUSS/DSCN MKR DOCD: CPT | Performed by: ORTHOPAEDIC SURGERY

## 2022-01-19 PROCEDURE — 4040F PNEUMOC VAC/ADMIN/RCVD: CPT | Performed by: ORTHOPAEDIC SURGERY

## 2022-01-19 RX ORDER — OXYCODONE HYDROCHLORIDE 5 MG/1
5 TABLET ORAL EVERY 4 HOURS PRN
COMMUNITY
Start: 2022-01-18

## 2022-01-19 NOTE — TELEPHONE ENCOUNTER
Per Dr Pricila Puri patient has been cleared for surgery. Dr Saira Parker called and spoke to Dr Pricila Puri on 1-18-22 and cleared her.

## 2022-01-19 NOTE — TELEPHONE ENCOUNTER
Spoke to Aneta Modi with [facility]. Patient should stop Coumadin Friday and NPO after midnight Sunday.

## 2022-01-19 NOTE — PROGRESS NOTES
[facility]  History and Physical  Shoulder Pain    Date:  2022    Name:  Chloe Kaplan  Address:  [place] [address]    :  3/2/1928      Age:   80 y.o.    SSN:  [id number]      Medical Record Number:  [id number]    Reason for Visit:    Shoulder Pain (F/U LEFT HUMERUS CT)      HPI:   Chloe Kaplan is a 80 y.o. female who presents to our office today to follow-up on her left shoulder proximal humerus fracture. The patient had a CT scan of her shoulder and wrist.   As a reminder, she is right-handed and was referred to us by Dr. Priscilla Nevarez. She presents to our office with her daughter and granddaughter who was present throughout the entire visit. Patient is in a wheelchair with her left arm in a sling. Patient unfortunately seen a mechanical fall in 2021 in her own home. The fall was not witnessed. She lives with her adult daughter who did take her to the emergency room where she was hospitalized for few days. She was found to have a displaced proximal humerus fracture and a displaced distal radius fracture. This discharged to a skilled nursing home facility from the hospital.  She has continued to convalesce there. She does have a fair amount of pain and has been receiving some medication at the facility. Prior to this injury she was utilizing a walker anytime she was ambulating. Dr. Priscilla Nevarez has already splinted her left wrist and placed her arm in a sling. The patient has distal radius fracture surgery scheduled with Dr. Keagan Nicole on . She denies numbness or tingling today.       Pain Assessment  Location of Pain: Wrist  Location Modifiers: Left  Severity of Pain: 10  Quality of Pain: Sharp,Dull,Aching  Duration of Pain: Persistent  Frequency of Pain: Constant  Limiting Behavior: Yes  Result of Injury: No  Work-Related Injury: No  Are there other pain locations you wish to document?: No    Review of Systems:  A 14 point review of systems available in the scanned medical record as documented by the patient. The review is negative with the exception of those things mentioned in the History of Present Illness and Past Medical History. Past History:  Past Medical History:   Diagnosis Date    Anemia     CAD (coronary artery disease)     Glaucoma     High blood pressure     Myocardial infarction [facility])      Past Surgical History:   Procedure Laterality Date    APPENDECTOMY      HIP ARTHROPLASTY Right 05/25/2017    RIGHT BIPOLAR HIP HEMIARTHROPLASTY        PACEMAKER INSERTION       Current Outpatient Medications on File Prior to Visit   Medication Sig Dispense Refill    oxyCODONE (ROXICODONE) 5 MG immediate release tablet       levothyroxine (SYNTHROID) 50 MCG tablet Take 50 mcg by mouth Daily      lisinopril (PRINIVIL;ZESTRIL) 2.5 MG tablet       warfarin (COUMADIN) 1 MG tablet Take 3 mg by mouth daily Cardiologist was notified to instruct ECF on when to hold for surgery per Deysi      latanoprost (XALATAN) 0.005 % ophthalmic solution Place 1 drop into both eyes nightly       nitroGLYCERIN (NITROSTAT) 0.4 MG SL tablet Place 0.4 mg under the tongue every 5 minutes as needed for Chest pain Dissolve 1 tab under tongue at first sign of chest pain. May repeat every 5 minutes until relief is obtained. If pain persists after taking 3 tabs in a 15-minute period, or the pain is different than is typically experienced, call 9-1-1 immediately.  metoprolol succinate (TOPROL XL) 25 MG extended release tablet Take 25 mg by mouth daily       No current facility-administered medications on file prior to visit. Social History     Socioeconomic History    Marital status:       Spouse name: Not on file    Number of children: Not on file    Years of education: Not on file    Highest education level: Not on file   Occupational History    Occupation: Retired   Tobacco Use    Smoking status: Never Smoker    Smokeless tobacco: Never Used   Vaping Use    Vaping Use: Never used   Substance and Sexual Activity    Alcohol use: Not Currently     Alcohol/week: 0.0 standard drinks     Comment: occasionally    Drug use: No    Sexual activity: Not on file   Other Topics Concern    Not on file   Social History Narrative    Not on file     Social Determinants of Health     Financial Resource Strain:     Difficulty of Paying Living Expenses: Not on file   Food Insecurity:     Worried About Running Out of Food in the Last Year: Not on file    Oz of Food in the Last Year: Not on file   Transportation Needs:     Lack of Transportation (Medical): Not on file    Lack of Transportation (Non-Medical): Not on file   Physical Activity:     Days of Exercise per Week: Not on file    Minutes of Exercise per Session: Not on file   Stress:     Feeling of Stress : Not on file   Social Connections:     Frequency of Communication with Friends and Family: Not on file    Frequency of Social Gatherings with Friends and Family: Not on file    Attends Tenri[employer] Services: Not on file    Active Member of [address] [place] [address] or Organizations: Not on file    Attends Club or Organization Meetings: Not on file    Marital Status: Not on file   Intimate Partner Violence:     Fear of Current or Ex-Partner: Not on file    Emotionally Abused: Not on file    Physically Abused: Not on file    Sexually Abused: Not on file   Housing Stability:     Unable to Pay for Housing in the Last Year: Not on file    Number of [place] in the Last Year: Not on file    Unstable Housing in the Last Year: Not on file     No family history on file.     Current Medications:    Current Outpatient Medications   Medication Sig Dispense Refill    oxyCODONE (ROXICODONE) 5 MG immediate release tablet       levothyroxine (SYNTHROID) 50 MCG tablet Take 50 mcg by mouth Daily      lisinopril (PRINIVIL;ZESTRIL) 2.5 MG tablet       warfarin (COUMADIN) 1 MG tablet Take 3 mg by mouth daily Cardiologist was notified to instruct ECF on when to hold for surgery per Deysi      latanoprost (XALATAN) 0.005 % ophthalmic solution Place 1 drop into both eyes nightly       nitroGLYCERIN (NITROSTAT) 0.4 MG SL tablet Place 0.4 mg under the tongue every 5 minutes as needed for Chest pain Dissolve 1 tab under tongue at first sign of chest pain. May repeat every 5 minutes until relief is obtained. If pain persists after taking 3 tabs in a 15-minute period, or the pain is different than is typically experienced, call 9-1-1 immediately.  metoprolol succinate (TOPROL XL) 25 MG extended release tablet Take 25 mg by mouth daily       No current facility-administered medications for this visit. Allergies: Allergies   Allergen Reactions    Penicillins Hives     hives    Sulfa Antibiotics Hives       Physical Exam:  There were no vitals filed for this visit. General: Erasmo Anne is a healthy and well appearing 80 y.o. female who is sitting comfortably in our office in acute distress. General Exam:   Constitutional: Patient is adequately groomed with no evidence of malnutrition  DTRs: Deep tendon reflexes are intact  Mental Status: The patient is oriented to time, place and person. The patient's mood and affect are appropriate. Lymphatic: The lymphatic examination bilaterally reveals all areas to be without enlargement or induration. Vascular: Examination reveals no swelling or calf tenderness. Peripheral pulses are palpable and 2+. Neurological: The patient has good coordination. There is no weakness or sensory deficit. Neuro: alert. Oriented X 3  Eyes: Extra-ocular muscles intact  Mouth: Oral mucosa moist. No perioral lesions  Pulm: Respirations unlabored and regular. left upper extremity exam:  Inspection: Swelling of the left upper extremity is present.   Ecchymosis although present throughout the entire, no signs of infection. Palpation:diffused tenderness present. Active Range of Motion: deferred due to trauma  Passive Range of Motion: deferred due to trauma    Strength:  deferred due to trauma    Neurovascular: Sensation to light touch is intact, no motor deficits, palpable radial pulses 2+    Additional Examinations:    Examination of the contralateral extremity does not show any tenderness, deformity or injury. Range of motion is unremarkable. There is no gross instability. There are no rashes, ulcerations or lesions. Strength and tone are normal.    Laboratory:  No visits with results within 14 Day(s) from this visit.    Latest known visit with results is:   Admission on 12/12/2021, Discharged on 12/14/2021   Component Date Value    WBC 12/12/2021 6.7     RBC 12/12/2021 4.70     Hemoglobin 12/12/2021 15.8     Hematocrit 12/12/2021 47.1     MCV 12/12/2021 100.2*    MCH 12/12/2021 33.6     MCHC 12/12/2021 33.6     RDW 12/12/2021 14.4     Platelets 26/73/8325 209     MPV 12/12/2021 9.0     Neutrophils % 12/12/2021 52.6     Lymphocytes % 12/12/2021 39.8     Monocytes % 12/12/2021 5.6     Eosinophils % 12/12/2021 1.3     Basophils % 12/12/2021 0.7     Neutrophils Absolute 12/12/2021 3.5     Lymphocytes Absolute 12/12/2021 2.7     Monocytes Absolute 12/12/2021 0.4     Eosinophils Absolute 12/12/2021 0.1     Basophils Absolute 12/12/2021 0.0     Rejected Test 12/12/2021 bmpx     Reason for Rejection 12/12/2021 see below     Sodium 12/12/2021 138     Potassium reflex Magnesi* 12/12/2021 4.3     Chloride 12/12/2021 105     CO2 12/12/2021 21     Anion Gap 12/12/2021 12     Glucose 12/12/2021 133*    BUN 12/12/2021 18     CREATININE 12/12/2021 0.7     GFR Non- 12/12/2021 >60     GFR  12/12/2021 >60     Calcium 12/12/2021 9.5     ABO/Rh 12/12/2021 B NEG     Antibody Screen 12/12/2021 NEG     Protime 12/12/2021 24.5*    INR 12/12/2021 2.10*    Sodium 12/13/2021 137     Potassium 12/13/2021 4.4     Chloride 12/13/2021 103     CO2 12/13/2021 20*    Anion Gap 12/13/2021 14     Glucose 12/13/2021 117*    BUN 12/13/2021 17     CREATININE 12/13/2021 0.6     GFR Non- 12/13/2021 >60     GFR  12/13/2021 >60     Calcium 12/13/2021 9.5     WBC 12/13/2021 11.1*    RBC 12/13/2021 4.00     Hemoglobin 12/13/2021 13.2     Hematocrit 12/13/2021 40.0     MCV 12/13/2021 100.0     MCH 12/13/2021 33.0     MCHC 12/13/2021 33.0     RDW 12/13/2021 14.6     Platelets 54/39/2538 182     MPV 12/13/2021 8.4     Neutrophils % 12/13/2021 81.2     Lymphocytes % 12/13/2021 12.6     Monocytes % 12/13/2021 5.4     Eosinophils % 12/13/2021 0.1     Basophils % 12/13/2021 0.7     Neutrophils Absolute 12/13/2021 9.0*    Lymphocytes Absolute 12/13/2021 1.4     Monocytes Absolute 12/13/2021 0.6     Eosinophils Absolute 12/13/2021 0.0     Basophils Absolute 12/13/2021 0.1     Protime 12/13/2021 27.5*    INR 12/13/2021 2.35*    Sodium 12/14/2021 137     Potassium 12/14/2021 4.1     Chloride 12/14/2021 104     CO2 12/14/2021 24     Anion Gap 12/14/2021 9     Glucose 12/14/2021 110*    BUN 12/14/2021 22*    CREATININE 12/14/2021 0.6     GFR Non- 12/14/2021 >60     GFR  12/14/2021 >60     Calcium 12/14/2021 9.2     WBC 12/14/2021 6.3     RBC 12/14/2021 3.43*    Hemoglobin 12/14/2021 11.7*    Hematocrit 12/14/2021 34.5*    MCV 12/14/2021 100.4*    MCH 12/14/2021 34.0     MCHC 12/14/2021 33.9     RDW 12/14/2021 14.5     Platelets 92/73/4713 163     MPV 12/14/2021 8.1     Neutrophils % 12/14/2021 62.4     Lymphocytes % 12/14/2021 28.2     Monocytes % 12/14/2021 8.3     Eosinophils % 12/14/2021 0.8     Basophils % 12/14/2021 0.3     Neutrophils Absolute 12/14/2021 3.9     Lymphocytes Absolute 12/14/2021 1.8     Monocytes Absolute 12/14/2021 0.5     Eosinophils Absolute 12/14/2021 0.1     Basophils Absolute 12/14/2021 0.0       No results found for this or any previous visit (from the past 24 hour(s)). Radiographic:  No new x-rays were obtained today. CT scan:  Comminuted, impacted fracture of the humeral head and neck. No evidence of dislocation. 2. Glenohumeral joint arthropathy with spurring of the inferomedial humeral head and joint    space loss. Capsulitis and effusion, possibly hemarthrosis evident. 3. Moderate acromioclavicular joint arthrosis. AC joint separation measuring 7 mm may be from    remote trauma or surgery. Assessment:  Mariana Price is a 80 y.o. female who presents with left shoulder proximal humerus fracture, with a head split, comminution and varus displacement. The patient has a distal radius fracture/dislocation as well and she is scheduled for distal radius surgery on Monday, January 24 with Dr. Urmila Can. Impression:  Encounter Diagnosis   Name Primary?  Other closed displaced fracture of proximal end of left humerus, initial encounter Yes       Office Procedures:  No orders of the defined types were placed in this encounter. Plan:   The proximal humerus fracture was discussed in detail with the patient during today's visit. We recommend for her to proceed with the distal radius surgery on Monday. We will see her back in 2 to 3 weeks after her surgery. In the meantime she will remain in a sling. We will have another assessment at her next follow-up in terms of her pain and function. If her pain is well controlled then we will proceed with physical therapy of the shoulder. If she still having pain of her shoulder then we would recommend surgical intervention. We will obtain new shoulder radiographs at her follow-up appointment    Fernando Briones MD  [facility] Clinical fellow  1/19/2022  12:17 PM       During this examination, Fernando RAPHAEL MD, functioned as a scribe for Dr. Gogo Coronel.  The history taking and physical examination were performed by Dr. Mercy Roy. All counseling during the appointment was performed between the patient and Dr. Mercy Roy. This dictation was performed with a verbal recognition program (DRAGON) and it was checked for errors. It is possible that there are still dictated errors within this office note. If so, please bring any areas to my attention for an addendum. All efforts were made to ensure that this office note is accurate.  ________________  I was physically present and personally supervised the Orthopaedic Sports Medicine Fellow in the evaluation and development of a treatment plan for this patient. I personally interviewed the patient and performed a physical examination. In addition, I discussed the patient's condition and treatment options with them. I have also reviewed and agree with the past medical, family and social history unless otherwise noted. All of the patient's questions were answered. Angel Luis Roy MD, PhD  1/19/2022

## 2022-01-19 NOTE — TELEPHONE ENCOUNTER
Dwain Neil from [facility] called in to speak with 's team about this patient and her Coumadin. Dwain Neil can be reached at [phone].      Yeison Solitario for the day at 2:30pm and will be going to lunch at 12:30pm.

## 2022-01-20 RX ORDER — MENTHOL AND ZINC OXIDE .44; 20.625 G/100G; G/100G
OINTMENT TOPICAL 4 TIMES DAILY PRN
COMMUNITY

## 2022-01-20 NOTE — PROGRESS NOTES
Spoke with Ar at [facility].  She stated that the covid test was being done currently and the results will be faxed to PAT [phone]

## 2022-01-24 ENCOUNTER — ANESTHESIA (OUTPATIENT)
Dept: OPERATING ROOM | Age: 87
DRG: 511 | End: 2022-01-24
Payer: MEDICARE

## 2022-01-24 NOTE — PROGRESS NOTES
SPOKE WITH LAYNE THE NURSE AT [facility] AND PER LAYNE PATIENT TESTED POSITIVE FOR COVID ON 1/20/2022 AND WAS ASYMPTOMATIC PER NURSE Kurt Ace. CALL TRANSFERRED TO [address] [place] [address] St DR. TRAORE'S OFFICE.  EMAIL SENT TO IBRAHIMA-PRE/POST/PACU/ENDO/PAT MANAGER RN

## 2022-01-24 NOTE — PROGRESS NOTES
[facility] CALLED TO SPEAK WITH NURSE- STATES HER NURSE WENT TO LUNCH AND TO CALL BACK IN 30 MINUTES-3:30PM-1/24/2022

## 2022-01-25 ENCOUNTER — TELEPHONE (OUTPATIENT)
Dept: ORTHOPEDIC SURGERY | Age: 87
End: 2022-01-25

## 2022-01-25 NOTE — PROGRESS NOTES
C-Difficile admission screening and protocol:     * Admitted with diarrhea? YES____    NO___X__     *Prior history of C-Diff. In last 3 months? YES____   NOX_____     *Antibiotic use in the past 6-8 weeks? NO___X___YES______                 If yes which  ANTIBIOTIC AND REASON______     *Prior hospitalization or nursing home in the last month?  YES____   NO__X__

## 2022-01-25 NOTE — PROGRESS NOTES
toes      For your safety, please do not wear any jewelry or body piercing's on the day of surgery. All jewelry must be removed. If you have dentures, they will be removed before going to operating room. For your convenience, we will provide you with a container. If you wear contact lenses or glasses, they will be removed, please bring a case for them. If you have a living will and a durable power of  for healthcare, please bring in a copy. As part of our patient safety program to minimize surgical site infections, we ask you to do the following:    · Please notify your surgeon if you develop any illness between         now and the  day of your surgery. · This includes a cough, cold, fever, sore throat, nausea,         or vomiting, and diarrhea, etc.  ·  Please notify your surgeon if you experience dizziness, shortness         of breath or blurred vision between now and the time of your surgery. Do not shave your operative site 96 hours prior to surgery. For face and neck surgery, men may use an electric razor 48 hours   prior to surgery. You may shower the night before surgery or the morning of   your surgery with an antibacterial soap. You will need to bring a photo ID and insurance card    [facility] has an onsite pharmacy, would you like to utilize our pharmacy     If you will be staying overnight and use a C-pap machine, please bring   your C-pap to hospital     Our goal is to provide you with excellent care, therefore, visitors will be limited to two(2) in the room at a time so that we may focus on providing this care for you. Please contact pre-admission testing if you have any further questions. [facility] phone number:  [phone] PAT fax number:  [phone]  Please note these are generalized instructions for all surgical cases, you may be provided with more specific instructions according to your surgery.

## 2022-01-25 NOTE — PROGRESS NOTES
Danny Michael(daughter/POA)has Covid to bring patient to hospital  Nursing home is aware--states they do not have staff to send with patient DOS  Patient tested positive for Covid on 1/20/22--is asymptomatic--notified manager Yehuda Flores on 1/25/22 @ [phone]

## 2022-01-25 NOTE — PROGRESS NOTES
Covid testing to be done on 1/20/22 @ [facility]--pt tested positive but is asymptomatic--MD aware  If positive---Pt instructed to notify MD ASAP   If negative--pt was instructed to bring results DOP/DOS

## 2022-01-26 ENCOUNTER — TELEPHONE (OUTPATIENT)
Dept: ORTHOPEDIC SURGERY | Age: 87
End: 2022-01-26

## 2022-01-26 NOTE — TELEPHONE ENCOUNTER
Set up transportation with nursing home for surgery on 1-31-22.   Patient will arrive at 7:45 and surgery will be 9:45

## 2022-01-26 NOTE — PROGRESS NOTES
Looks like arrangements have been made for patient to move forward with surgery.  Pre-Operative Instructions have been faxed to nursing home

## 2022-01-31 ENCOUNTER — APPOINTMENT (OUTPATIENT)
Dept: CT IMAGING | Age: 87
DRG: 511 | End: 2022-01-31
Attending: ORTHOPAEDIC SURGERY
Payer: MEDICARE

## 2022-01-31 ENCOUNTER — HOSPITAL ENCOUNTER (INPATIENT)
Age: 87
LOS: 5 days | Discharge: NURSING, REHAB OR LONG-TERM CARE | DRG: 511 | End: 2022-02-05
Attending: ORTHOPAEDIC SURGERY | Admitting: ORTHOPAEDIC SURGERY
Payer: MEDICARE

## 2022-01-31 DIAGNOSIS — S52.572A OTHER CLOSED INTRA-ARTICULAR FRACTURE OF DISTAL END OF LEFT RADIUS, INITIAL ENCOUNTER: Primary | ICD-10-CM

## 2022-01-31 PROBLEM — T45.515A WARFARIN-INDUCED COAGULOPATHY (HCC): Status: ACTIVE | Noted: 2022-01-31

## 2022-01-31 PROBLEM — D68.32 WARFARIN-INDUCED COAGULOPATHY (HCC): Status: ACTIVE | Noted: 2022-01-31

## 2022-01-31 PROBLEM — R79.1 ELEVATED INR: Status: ACTIVE | Noted: 2022-01-31

## 2022-01-31 LAB
BASE EXCESS ARTERIAL: 3 MMOL/L (ref -3–3)
CARBOXYHEMOGLOBIN ARTERIAL: 1.3 % (ref 0–1.5)
GLUCOSE BLD-MCNC: 110 MG/DL (ref 70–99)
HCO3 ARTERIAL: 26.8 MMOL/L (ref 21–29)
HEMOGLOBIN, ART, EXTENDED: 16 G/DL (ref 12–16)
INR BLD: 4.22 (ref 0.88–1.12)
METHEMOGLOBIN ARTERIAL: 0.4 %
O2 SAT, ARTERIAL: 96.4 %
O2 THERAPY: ABNORMAL
PCO2 ARTERIAL: 37.5 MMHG (ref 35–45)
PERFORMED ON: ABNORMAL
PH ARTERIAL: 7.46 (ref 7.35–7.45)
PO2 ARTERIAL: 77.9 MMHG (ref 75–108)
PROTHROMBIN TIME: 50.6 SEC (ref 9.9–12.7)
TCO2 ARTERIAL: 27.9 MMOL/L

## 2022-01-31 PROCEDURE — 82803 BLOOD GASES ANY COMBINATION: CPT

## 2022-01-31 PROCEDURE — 70450 CT HEAD/BRAIN W/O DYE: CPT

## 2022-01-31 PROCEDURE — G0378 HOSPITAL OBSERVATION PER HR: HCPCS

## 2022-01-31 PROCEDURE — 85610 PROTHROMBIN TIME: CPT

## 2022-01-31 PROCEDURE — 1200000000 HC SEMI PRIVATE

## 2022-01-31 PROCEDURE — 6360000002 HC RX W HCPCS: Performed by: INTERNAL MEDICINE

## 2022-01-31 PROCEDURE — 6370000000 HC RX 637 (ALT 250 FOR IP): Performed by: INTERNAL MEDICINE

## 2022-01-31 PROCEDURE — 36600 WITHDRAWAL OF ARTERIAL BLOOD: CPT

## 2022-01-31 PROCEDURE — 87040 BLOOD CULTURE FOR BACTERIA: CPT

## 2022-01-31 PROCEDURE — 94761 N-INVAS EAR/PLS OXIMETRY MLT: CPT

## 2022-01-31 PROCEDURE — 36415 COLL VENOUS BLD VENIPUNCTURE: CPT

## 2022-01-31 RX ORDER — POLYETHYLENE GLYCOL 3350 17 G/17G
17 POWDER, FOR SOLUTION ORAL DAILY PRN
Status: DISCONTINUED | OUTPATIENT
Start: 2022-01-31 | End: 2022-02-05 | Stop reason: HOSPADM

## 2022-01-31 RX ORDER — ONDANSETRON 4 MG/1
4 TABLET, ORALLY DISINTEGRATING ORAL EVERY 8 HOURS PRN
Status: DISCONTINUED | OUTPATIENT
Start: 2022-01-31 | End: 2022-02-05 | Stop reason: HOSPADM

## 2022-01-31 RX ORDER — SODIUM CHLORIDE 9 MG/ML
INJECTION, SOLUTION INTRAVENOUS CONTINUOUS
Status: DISCONTINUED | OUTPATIENT
Start: 2022-01-31 | End: 2022-01-31

## 2022-01-31 RX ORDER — HYDRALAZINE HYDROCHLORIDE 20 MG/ML
10 INJECTION INTRAMUSCULAR; INTRAVENOUS EVERY 6 HOURS PRN
Status: DISCONTINUED | OUTPATIENT
Start: 2022-01-31 | End: 2022-01-31

## 2022-01-31 RX ORDER — SODIUM CHLORIDE 0.9 % (FLUSH) 0.9 %
5-40 SYRINGE (ML) INJECTION EVERY 12 HOURS SCHEDULED
Status: DISCONTINUED | OUTPATIENT
Start: 2022-01-31 | End: 2022-02-05 | Stop reason: HOSPADM

## 2022-01-31 RX ORDER — SODIUM CHLORIDE 0.9 % (FLUSH) 0.9 %
5-40 SYRINGE (ML) INJECTION PRN
Status: DISCONTINUED | OUTPATIENT
Start: 2022-01-31 | End: 2022-01-31 | Stop reason: HOSPADM

## 2022-01-31 RX ORDER — PHYTONADIONE 5 MG/1
5 TABLET ORAL ONCE
Status: COMPLETED | OUTPATIENT
Start: 2022-01-31 | End: 2022-01-31

## 2022-01-31 RX ORDER — FENTANYL CITRATE 50 UG/ML
25 INJECTION, SOLUTION INTRAMUSCULAR; INTRAVENOUS EVERY 5 MIN PRN
Status: DISCONTINUED | OUTPATIENT
Start: 2022-01-31 | End: 2022-01-31 | Stop reason: HOSPADM

## 2022-01-31 RX ORDER — SODIUM CHLORIDE 9 MG/ML
25 INJECTION, SOLUTION INTRAVENOUS PRN
Status: DISCONTINUED | OUTPATIENT
Start: 2022-01-31 | End: 2022-01-31 | Stop reason: HOSPADM

## 2022-01-31 RX ORDER — HYDRALAZINE HYDROCHLORIDE 20 MG/ML
10 INJECTION INTRAMUSCULAR; INTRAVENOUS EVERY 6 HOURS PRN
Status: DISCONTINUED | OUTPATIENT
Start: 2022-01-31 | End: 2022-02-01

## 2022-01-31 RX ORDER — ONDANSETRON 2 MG/ML
4 INJECTION INTRAMUSCULAR; INTRAVENOUS
Status: DISCONTINUED | OUTPATIENT
Start: 2022-01-31 | End: 2022-01-31 | Stop reason: HOSPADM

## 2022-01-31 RX ORDER — HYDROCODONE BITARTRATE AND ACETAMINOPHEN 5; 325 MG/1; MG/1
1 TABLET ORAL EVERY 6 HOURS PRN
Qty: 12 TABLET | Refills: 0 | Status: SHIPPED | OUTPATIENT
Start: 2022-01-31 | End: 2022-02-03

## 2022-01-31 RX ORDER — ACETAMINOPHEN 650 MG/1
650 SUPPOSITORY RECTAL EVERY 6 HOURS PRN
Status: DISCONTINUED | OUTPATIENT
Start: 2022-01-31 | End: 2022-02-05 | Stop reason: HOSPADM

## 2022-01-31 RX ORDER — METOPROLOL SUCCINATE 25 MG/1
25 TABLET, EXTENDED RELEASE ORAL DAILY
Status: DISCONTINUED | OUTPATIENT
Start: 2022-01-31 | End: 2022-02-05 | Stop reason: HOSPADM

## 2022-01-31 RX ORDER — SODIUM CHLORIDE 0.9 % (FLUSH) 0.9 %
5-40 SYRINGE (ML) INJECTION EVERY 12 HOURS SCHEDULED
Status: DISCONTINUED | OUTPATIENT
Start: 2022-01-31 | End: 2022-01-31 | Stop reason: HOSPADM

## 2022-01-31 RX ORDER — CLINDAMYCIN HYDROCHLORIDE 300 MG/1
300 CAPSULE ORAL 3 TIMES DAILY
Qty: 15 CAPSULE | Refills: 0 | Status: SHIPPED | OUTPATIENT
Start: 2022-01-31 | End: 2022-02-05

## 2022-01-31 RX ORDER — LEVOTHYROXINE SODIUM 0.05 MG/1
50 TABLET ORAL DAILY
Status: DISCONTINUED | OUTPATIENT
Start: 2022-02-01 | End: 2022-02-05 | Stop reason: HOSPADM

## 2022-01-31 RX ORDER — LISINOPRIL 5 MG/1
5 TABLET ORAL DAILY
Status: DISCONTINUED | OUTPATIENT
Start: 2022-01-31 | End: 2022-02-01

## 2022-01-31 RX ORDER — ACETAMINOPHEN 325 MG/1
650 TABLET ORAL EVERY 6 HOURS PRN
Status: DISCONTINUED | OUTPATIENT
Start: 2022-01-31 | End: 2022-02-05 | Stop reason: HOSPADM

## 2022-01-31 RX ORDER — CLINDAMYCIN PHOSPHATE 900 MG/50ML
900 INJECTION INTRAVENOUS
Status: DISCONTINUED | OUTPATIENT
Start: 2022-01-31 | End: 2022-01-31 | Stop reason: HOSPADM

## 2022-01-31 RX ORDER — ONDANSETRON 2 MG/ML
4 INJECTION INTRAMUSCULAR; INTRAVENOUS EVERY 6 HOURS PRN
Status: DISCONTINUED | OUTPATIENT
Start: 2022-01-31 | End: 2022-02-05 | Stop reason: HOSPADM

## 2022-01-31 RX ORDER — SODIUM CHLORIDE 0.9 % (FLUSH) 0.9 %
5-40 SYRINGE (ML) INJECTION PRN
Status: DISCONTINUED | OUTPATIENT
Start: 2022-01-31 | End: 2022-02-05 | Stop reason: HOSPADM

## 2022-01-31 RX ORDER — LATANOPROST 50 UG/ML
1 SOLUTION/ DROPS OPHTHALMIC NIGHTLY
Status: DISCONTINUED | OUTPATIENT
Start: 2022-01-31 | End: 2022-02-05 | Stop reason: HOSPADM

## 2022-01-31 RX ADMIN — LISINOPRIL 5 MG: 5 TABLET ORAL at 20:23

## 2022-01-31 RX ADMIN — LATANOPROST 1 DROP: 50 SOLUTION OPHTHALMIC at 22:30

## 2022-01-31 RX ADMIN — PHYTONADIONE 5 MG: 5 TABLET ORAL at 20:23

## 2022-01-31 RX ADMIN — HYDRALAZINE HYDROCHLORIDE 10 MG: 20 INJECTION INTRAMUSCULAR; INTRAVENOUS at 18:15

## 2022-01-31 RX ADMIN — METOPROLOL SUCCINATE 25 MG: 25 TABLET, EXTENDED RELEASE ORAL at 20:23

## 2022-01-31 ASSESSMENT — PAIN SCALES - PAIN ASSESSMENT IN ADVANCED DEMENTIA (PAINAD)
TOTALSCORE: 1
CONSOLABILITY: 0
BODYLANGUAGE: 0
NEGVOCALIZATION: 1
FACIALEXPRESSION: 0
BREATHING: 0

## 2022-01-31 ASSESSMENT — PAIN - FUNCTIONAL ASSESSMENT: PAIN_FUNCTIONAL_ASSESSMENT: FLACC

## 2022-01-31 ASSESSMENT — ENCOUNTER SYMPTOMS: SHORTNESS OF BREATH: 0

## 2022-01-31 NOTE — H&P
Hospital Medicine History & Physical      PCP: Antoinette Spicer MD    Date of Admission: 1/31/2022    Date of Service: 01/31/22      Chief Complaint:  Elevated INR      History Of Present Illness: 77-year-old female past medical history significant for anemia, CAD and hypertension presented for elective cath of left radial fracture. Patient noted to have elevated INR before surgery 4.2. According to medical records she is from a nursing home. She takes warfarin for unclear reasons. Patient did not provide a much history, she appears to be pleasantly demented    Past Medical History:          Diagnosis Date    Anemia     CAD (coronary artery disease)     Glaucoma     High blood pressure     Myocardial infarction [facility])        Past Surgical History:          Procedure Laterality Date    APPENDECTOMY      HIP ARTHROPLASTY Right 05/25/2017    RIGHT BIPOLAR HIP HEMIARTHROPLASTY        PACEMAKER INSERTION         Medications Prior to Admission:      Prior to Admission medications    Medication Sig Start Date End Date Taking? Authorizing Provider   clindamycin (CLEOCIN) 300 MG capsule Take 1 capsule by mouth 3 times daily for 5 days 1/31/22 2/5/22 Yes Joaquin Choudhury MD   HYDROcodone-acetaminophen (NORCO) 5-325 MG per tablet Take 1 tablet by mouth every 6 hours as needed for Pain for up to 3 days. Intended supply: 3 days. Take lowest dose possible to manage pain 1/31/22 2/3/22 Yes Joaquin Choudhury MD   menthol-zinc oxide (CALMOSEPTINE) 0.44-20.625 % OINT ointment Apply topically 4 times daily as needed Max 30 ml per day.    Yes Historical Provider, MD   oxyCODONE (ROXICODONE) 5 MG immediate release tablet  1/18/22  Yes Historical Provider, MD   levothyroxine (SYNTHROID) 50 MCG tablet Take 50 mcg by mouth Daily   Yes Historical Provider, MD   lisinopril (PRINIVIL;ZESTRIL) 2.5 MG tablet  6/26/17  Yes Historical Provider, MD   warfarin (COUMADIN) 1 MG tablet Take 3 mg by mouth daily Order in ECF chart states that coumadin will be held 1/21-1/26 for surgery 4/25/17  Yes Historical Provider, MD   latanoprost (XALATAN) 0.005 % ophthalmic solution Place 1 drop into both eyes nightly    Yes Historical Provider, MD   metoprolol succinate (TOPROL XL) 25 MG extended release tablet Take 25 mg by mouth daily   Yes Historical Provider, MD   nitroGLYCERIN (NITROSTAT) 0.4 MG SL tablet Place 0.4 mg under the tongue every 5 minutes as needed for Chest pain Dissolve 1 tab under tongue at first sign of chest pain. May repeat every 5 minutes until relief is obtained. If pain persists after taking 3 tabs in a 15-minute period, or the pain is different than is typically experienced, call 9-1-1 immediately. Historical Provider, MD       Allergies:  Doxycycline, Erythromycin, Penicillins, Propoxyphene, and Sulfa antibiotics    Social History:      The patient currently lives nursing home    TOBACCO:   reports that she has never smoked. She has never used smokeless tobacco.  ETOH:   reports previous alcohol use. E-Cigarettes/Vaping Use     Questions Responses    E-Cigarette/Vaping Use Never User    Start Date     Passive Exposure     Quit Date     Counseling Given     Comments             Family History:       Reviewed in detail and negative for DM, CAD, Cancer, CVA. Positive as follows:    History reviewed. No pertinent family history. REVIEW OF SYSTEMS COMPLETED:   Pertinent positives as noted in the HPI. All other systems reviewed and negative. PHYSICAL EXAM PERFORMED:    BP (!) 146/63   Pulse 70   Temp 96.3 °F (35.7 °C) (Temporal)   Resp 18   Ht 5' (1.524 m)   Wt 115 lb (52.2 kg)   SpO2 96%   BMI 22.46 kg/m²     General appearance:  No apparent distress, appears stated age and cooperative. HEENT:  Normal cephalic, atraumatic without obvious deformity. Pupils equal, round, and reactive to light. Extra ocular muscles intact. Conjunctivae/corneas clear. Neck: Supple, with full range of motion. No jugular venous distention. Trachea midline. Respiratory:  Normal respiratory effort. Clear to auscultation, bilaterally without Rales/Wheezes/Rhonchi. Cardiovascular:  Regular rate and rhythm with normal S1/S2 without murmurs, rubs or gallops. Abdomen: Soft, non-tender, non-distended with normal bowel sounds. Musculoskeletal:  No clubbing, cyanosis or edema bilaterally. Full range of motion without deformity. Skin: Skin color, texture, turgor normal.  No rashes or lesions. Neurologic:  Neurovascularly intact without any focal sensory/motor deficits. Cranial nerves: II-XII intact, grossly non-focal.  Psychiatric:  Alert and oriented, thought content appropriate, normal insight  Capillary Refill: Brisk,3 seconds, normal  Peripheral Pulses: +2 palpable, equal bilaterally       Labs:     No results for input(s): WBC, HGB, HCT, PLT in the last 72 hours. No results for input(s): NA, K, CL, CO2, BUN, CREATININE, CALCIUM, PHOS in the last 72 hours. Invalid input(s): MAGNES  No results for input(s): AST, ALT, BILIDIR, BILITOT, ALKPHOS in the last 72 hours. Recent Labs     01/31/22  0932   INR 4.22*     No results for input(s): Bushra Alis in the last 72 hours. Urinalysis:      Lab Results   Component Value Date    NITRU Negative 10/07/2020    WBCUA 8 10/07/2020    BACTERIA 4+ 05/29/2017    RBCUA 1 10/07/2020    BLOODU Negative 10/07/2020    SPECGRAV 1.012 10/07/2020    GLUCOSEU Negative 10/07/2020       Radiology:       No orders to display       ASSESSMENT:    Active Hospital Problems    Diagnosis Date Noted    [facility] [Q50.07, Y49.217I] 01/31/2022    HTN (hypertension) [I10] 10/21/2016         PLAN:    Gave 1 dose of vitamin K p.o. Repeat INR in a.m. Resume blood pressure medication    DVT Prophylaxis: none    Diet: ADULT DIET; Regular;  Low Sodium (2 gm)  Code Status: Full Code    PT/OT Eval Status: no need     Dispo - none due to coagulopathy       Ltey Degroot MD    Thank you Dillon Brink Ananda Emmanuel MD for the opportunity to be involved in this patient's care. If you have any questions or concerns please feel free to contact me at [phone].

## 2022-01-31 NOTE — PROGRESS NOTES
Called nurse Jauregui at nursing home and pt's daughter Saad December to inform her that pt's surgery is canceled for today d/t elevated INR, plan is to admit pt and rescheduled for later this week.

## 2022-01-31 NOTE — PROGRESS NOTES
Patient becoming more awake and alert. Yelling out \"Help me\". Won't respond to orientation questions. Not easily reoriented. Blood glucose checked per MD. Blood glucose 110.

## 2022-01-31 NOTE — PROGRESS NOTES
No blood return noted from PICC. Patient admitted with PICC in place. Requested Cathflo from MD. No new orders for Cathflo. MD prefers peripheral IV to be used.

## 2022-01-31 NOTE — PROGRESS NOTES
4 Eyes Skin Assessment     NAME:  Sylvia Coleman  YOB: 1928  MEDICAL RECORD NUMBER:  [id number]    The patient is being assess for  Admission    I agree that 2 RN's have performed a thorough Head to Toe Skin Assessment on the patient. ALL assessment sites listed below have been assessed. Areas assessed by both nurses:    Head, Face, Ears, Shoulders, Back, Chest, Arms, Elbows, Hands, Sacrum. Buttock, Coccyx, Ischium and Legs. Feet and Heels        Does the Patient have a Wound?  No noted wound(s)       Bernardino Prevention initiated:  Yes   Wound Care Orders initiated:  No    Pressure Injury (Stage 3,4, Unstageable, DTI, NWPT, and Complex wounds) if present place consult order under [de-identified] No    New and Established Ostomies if present place consult order under : No      Nurse 1 eSignature: Electronically signed by Alley Wilkinson RN on 1/31/22 at 4:47 PM EST    **SHARE this note so that the co-signing nurse is able to place an eSignature**    Nurse 2 eSignature: Electronically signed by Annabel Marrero RN on 1/31/22 at 6:22 PM EST

## 2022-01-31 NOTE — PROGRESS NOTES
Spoke with pt's daughter Angelica Pike, consent for surgery received by myself and VALENTINE Hoffman RN.

## 2022-01-31 NOTE — PROGRESS NOTES
Attempted to call [facility] for admission questions. No answer at this time. Unable to leave message due to full mailbox.

## 2022-01-31 NOTE — ANESTHESIA PRE PROCEDURE
Department of Anesthesiology  Preprocedure Note       Name:  Kaya Jimenez   Age:  80 y.o.  :  3/2/1928                                          MRN:  [id number]         Date:  2022      Surgeon: Jaswinder Dubois):  Ray Orozco MD    Procedure: Procedure(s):  OPEN REDUCTION INTERNAL FIXATION LEFT DISTAL RADIUS    Medications prior to admission:   Prior to Admission medications    Medication Sig Start Date End Date Taking? Authorizing Provider   clindamycin (CLEOCIN) 300 MG capsule Take 1 capsule by mouth 3 times daily for 5 days 22 Yes Ray Orozco MD   HYDROcodone-acetaminophen (NORCO) 5-325 MG per tablet Take 1 tablet by mouth every 6 hours as needed for Pain for up to 3 days. Intended supply: 3 days. Take lowest dose possible to manage pain 1/31/22 2/3/22 Yes Ray Orozco MD   menthol-zinc oxide (CALMOSEPTINE) 0.44-20.625 % OINT ointment Apply topically 4 times daily as needed Max 30 ml per day. Yes Historical Provider, MD   oxyCODONE (ROXICODONE) 5 MG immediate release tablet  22  Yes Historical Provider, MD   levothyroxine (SYNTHROID) 50 MCG tablet Take 50 mcg by mouth Daily   Yes Historical Provider, MD   lisinopril (PRINIVIL;ZESTRIL) 2.5 MG tablet  17  Yes Historical Provider, MD   warfarin (COUMADIN) 1 MG tablet Take 3 mg by mouth daily [facility] in [facility] states that coumadin will be held - for surgery 17  Yes Historical Provider, MD   latanoprost (XALATAN) 0.005 % ophthalmic solution Place 1 drop into both eyes nightly    Yes Historical Provider, MD   metoprolol succinate (TOPROL XL) 25 MG extended release tablet Take 25 mg by mouth daily   Yes Historical Provider, MD   nitroGLYCERIN (NITROSTAT) 0.4 MG SL tablet Place 0.4 mg under the tongue every 5 minutes as needed for Chest pain Dissolve 1 tab under tongue at first sign of chest pain. May repeat every 5 minutes until relief is obtained.  If pain persists after taking 3 tabs in a 15-minute period, or the pain is different than is typically experienced, call 9-1-1 immediately. Historical Provider, MD       Current medications:    Current Facility-Administered Medications   Medication Dose Route Frequency Provider Last Rate Last Admin    0.9 % sodium chloride infusion   IntraVENous Continuous Sahil Cruz MD        sodium chloride flush 0.9 % injection 5-40 mL  5-40 mL IntraVENous 2 times per day Sahil Cruz MD        sodium chloride flush 0.9 % injection 5-40 mL  5-40 mL IntraVENous PRN Sahil Cruz MD        0.9 % sodium chloride infusion  25 mL IntraVENous PRN Sahil Cruz MD        clindamycin (CLEOCIN) 900 mg in dextrose 5 % 50 mL IVPB  900 mg IntraVENous On Call to [id number] Alexey Amezquita MD           Allergies:     Allergies   Allergen Reactions    Doxycycline Nausea Only    Erythromycin Hives    Penicillins Hives    Propoxyphene Hives    Sulfa Antibiotics Hives       Problem List:    Patient Active Problem List   Diagnosis Code    HTN (hypertension) I10    Hyperkalemia E87.5    Knee pain M25.569    Atrial fibrillation with RVR ([address] I48.91    5/25/17 RIGHT hip hemiarthroplasty S72.001A    Dementia (HCC) F03.90    GERD (gastroesophageal reflux disease) K21.9    Chronic atrial fibrillation (HCC) I48.20    Cardiomyopathy, ischemic I25.5    Chronic anticoagulation Z79.01    Generalized weakness R53.1    Hypertensive urgency I16.0    Closed fracture of proximal end of left humerus S42.202A    Fracture, [place], left, closed S52.532A       Past Medical History:        Diagnosis Date    Anemia     CAD (coronary artery disease)     Glaucoma     High blood pressure     [facility] infarction [facility])        Past Surgical History:        Procedure Laterality Date    APPENDECTOMY      HIP ARTHROPLASTY Right 05/25/2017    RIGHT BIPOLAR HIP HEMIARTHROPLASTY        PACEMAKER INSERTION         Social History:    Social History     Tobacco Use    Smoking status: Never Smoker    Smokeless tobacco: Never Used   Substance Use Topics    Alcohol use: Not Currently     Alcohol/week: 0.0 standard drinks     Comment: occasionally                                Counseling given: Not Answered      Vital Signs (Current):   Vitals:    01/31/22 0818   BP: (!) 146/63   Pulse: 70   Resp: 18   Temp: 96.3 °F (35.7 °C)   TempSrc: Temporal   SpO2: 96%   Weight: 115 lb (52.2 kg)   Height: 5' (1.524 m)                                              BP Readings from Last 3 Encounters:   01/31/22 (!) 146/63   12/14/21 120/75   10/09/20 115/83       NPO Status: Time of last liquid consumption: 2100                        Time of last solid consumption: 1800                        Date of last liquid consumption: 01/30/22                        Date of last solid food consumption: 01/30/22    BMI:   Wt Readings from Last 3 Encounters:   01/31/22 115 lb (52.2 kg)   01/19/22 115 lb (52.2 kg)   01/10/22 115 lb (52.2 kg)     Body mass index is 22.46 kg/m². CBC:   Lab Results   Component Value Date    WBC 6.3 12/14/2021    RBC 3.43 12/14/2021    HGB 11.7 12/14/2021    HCT 34.5 12/14/2021    .4 12/14/2021    RDW 14.5 12/14/2021     12/14/2021       CMP:   Lab Results   Component Value Date     12/14/2021    K 4.1 12/14/2021    K 4.3 12/12/2021     12/14/2021    CO2 24 12/14/2021    BUN 22 12/14/2021    CREATININE 0.6 12/14/2021    GFRAA >60 12/14/2021    GFRAA >60 12/04/2012    AGRATIO 1.5 10/07/2020    LABGLOM >60 12/14/2021    GLUCOSE 110 12/14/2021    PROT 6.8 10/07/2020    CALCIUM 9.2 12/14/2021    BILITOT 0.6 10/07/2020    ALKPHOS 83 10/07/2020    AST 28 10/07/2020    ALT 16 10/07/2020       POC Tests: No results for input(s): POCGLU, POCNA, POCK, POCCL, POCBUN, POCHEMO, POCHCT in the last 72 hours.     Coags:   Lab Results   Component Value Date    PROTIME 27.5 12/13/2021    PROTIME 14.2 01/08/2010    INR 2.35 12/13/2021    APTT 36.1 12/04/2012       HCG (If Applicable): No results found for: PREGTESTUR, PREGSERUM, HCG, HCGQUANT     ABGs: No results found for: PHART, PO2ART, BYT5AQN, AWT0WHR, BEART, E0TCVNYH     Type & Screen (If Applicable):  No results found for: LABABO, LABRH    Drug/Infectious Status (If Applicable):  No results found for: HIV, HEPCAB    COVID-19 Screening (If Applicable):   Lab Results   Component Value Date    COVID19 Not Detected 10/09/2020           Anesthesia Evaluation  Patient summary reviewed no history of anesthetic complications:   Airway: Mallampati: Unable to assess / NA        Dental:      Comment: Missing teeth    Pulmonary: breath sounds clear to auscultation      (-) COPD, asthma, shortness of breath, recent URI and sleep apnea                          ROS comment: covid 19 1/20/22   Cardiovascular:    (+) hypertension:, valvular problems/murmurs (TR):, pacemaker: pacemaker, past MI:, CAD:, dysrhythmias: atrial fibrillation, CHF:,         Rhythm: regular  Rate: normal  Echocardiogram reviewed    Cleared by cardiology           ROS comment: Cardiology states patient is high risk      Neuro/Psych:   (+) psychiatric history:   (-) seizures, neuromuscular disease and TIA            ROS comment: Dementia   GI/Hepatic/Renal:   (+) GERD:,      (-) PUD, hepatitis and liver disease       Endo/Other:    (+) hypothyroidism, blood dyscrasia (patient remains on coumadin. Last dose yesterday)::., .    (-) diabetes mellitus               Abdominal:             Vascular: Other Findings:           Anesthesia Plan      general     ASA 4     (Extensive discussion with Daughter Joseph Devlin via phone. Daughter wants DNR suspended for surgery. Original plan for regional plus MAC in OR however, patient remained on coumadin, last dose yesterday. Will plan for general anesthesia. Daughter understands risks and wants patient to proceed with surgery. )  Induction: intravenous. MIPS: Postoperative opioids intended and Prophylactic antiemetics administered.   Anesthetic plan and risks discussed with healthcare power of  and child/children.                     Elmer Winchester MD   1/31/2022

## 2022-01-31 NOTE — H&P
Preoperative H&P Update    The patient's History and Physical in the medical record from 1/18/2022 was reviewed by me today. Past Medical History:   Diagnosis Date    Anemia     CAD (coronary artery disease)     Glaucoma     High blood pressure     [facility]ocardial infarction [facility])      Past Surgical History:   Procedure Laterality Date    APPENDECTOMY      HIP ARTHROPLASTY Right 05/25/2017    RIGHT BIPOLAR HIP HEMIARTHROPLASTY        PACEMAKER INSERTION       No current facility-administered medications on file prior to encounter. Current Outpatient Medications on File Prior to Encounter   Medication Sig Dispense Refill    menthol-zinc oxide (CALMOSEPTINE) 0.44-20.625 % OINT ointment Apply topically 4 times daily as needed Max 30 ml per day.  levothyroxine (SYNTHROID) 50 MCG tablet Take 50 mcg by mouth Daily      lisinopril (PRINIVIL;ZESTRIL) 2.5 MG tablet       warfarin (COUMADIN) 1 MG tablet Take 3 mg by mouth daily Order in ECF chart states that coumadin will be held 1/21-1/26 for surgery      latanoprost (XALATAN) 0.005 % ophthalmic solution Place 1 drop into both eyes nightly       metoprolol succinate (TOPROL XL) 25 MG extended release tablet Take 25 mg by mouth daily      nitroGLYCERIN (NITROSTAT) 0.4 MG SL tablet Place 0.4 mg under the tongue every 5 minutes as needed for Chest pain Dissolve 1 tab under tongue at first sign of chest pain. May repeat every 5 minutes until relief is obtained. If pain persists after taking 3 tabs in a 15-minute period, or the pain is different than is typically experienced, call 9-1-1 immediately. Allergies   Allergen Reactions    Doxycycline Nausea Only    Erythromycin Hives    Penicillins Hives    Propoxyphene Hives    Sulfa Antibiotics Hives      I reviewed the HPI, medications, allergies, reason for surgery, diagnosis and treatment plan and there has been no change. The patient was evaluated by me today.  Physical exam findings for this update include:    Vitals:    01/31/22 0818   BP: (!) 146/63   Pulse: 70   Resp: 18   Temp: 96.3 °F (35.7 °C)   SpO2: 96%     Airway is intact  Chest: chest clear, no wheezing, rales, normal symmetric air entry, no tachypnea, retractions or cyanosis  Heart: regular rate and rhythm ; heart sounds normal  Findings on exam of the body region where surgery is to be performed include:  Left wrist displaced distal radius fracture.     Electronically signed by Genet Antony MD on 1/31/2022 at 9:28 AM

## 2022-01-31 NOTE — PLAN OF CARE
Problem: Falls - Risk of:  Goal: Will remain free from falls  Description: Will remain free from falls  Outcome: Ongoing  Note: Fall risk assessment completed. Fall precautions in place. Call light within reach. Pt educated on calling for assistance before getting up. Walkway free of clutter. Will continue to monitor. Electronically signed by Jesenia Ortiz RN on 1/31/2022 at 6:28 PM      Problem: Pain:  Goal: Pain level will decrease  Description: Pain level will decrease  Outcome: Ongoing  Note: Educated patient on pain management. Will assess patients pain level per unit protocol, and provide pain management measures as needed.    Electronically signed by Jesenia Ortiz RN on 1/31/2022 at 6:28 PM

## 2022-01-31 NOTE — PROGRESS NOTES
Patient arrived to unit with eyes closed. Awakens easily to voice and stimulus. Moans in pain when touched or moved. Easily falls back to sleep. Alert to self only. Doesn't follow commands. /132. Lisinopril and Metoprolol scheduled for 1700. Medications held by this RN due to drowsiness. Secure message sent to Dr. Esme Juarez regarding BP and drowsiness. ABG ordered. New orders for IV Hydralazine 10 mg PRN every 6 hours. Continuous fluids were ordered for 50 ml/hr pre-op. Discussed with MD regarding continuous fluids. D/C at this time until patient is NPO. Left arm in sling. RUE PICC flushes with no blood return. Requesting Cathflo from MD. Bed alarm. Will continue to monitor.      Electronically signed by Meg Gamboa RN on 1/31/2022 at 5:20 PM

## 2022-01-31 NOTE — PROGRESS NOTES
Spoke with pt's nurse at the nursing home, Kym Dev. Informed her that the PICC line dressing had not been changed for 11 days and that the PICC line was leaking and has no blood return, it needs to be removed. Called into OR 1, Dr. Sandra Rhoades informed pt took 2.5mg coumadin yesterday.

## 2022-01-31 NOTE — DISCHARGE INSTR - COC
[facility]) Continuity of Care Form    Patient Name:  Marguerite Terrell  : 3/2/1928    MRN:  [id number]    Admit date:  2022  Discharge date:  22    Code Status Order: Full Code  Advance Directives: Yes    Admitting Physician: Sarwat Balderrama MD  PCP: Conrad Tarango MD    Discharging Nurse: Nayan Ramirez Unit/Room#: R4A-3188/3117-01  Discharging Unit Phone Number: [phone]    Emergency Contact:        Past Surgical History:  Past Surgical History:   Procedure Laterality Date    APPENDECTOMY      HIP ARTHROPLASTY Right 2017    RIGHT BIPOLAR HIP HEMIARTHROPLASTY        PACEMAKER INSERTION         Immunization History:   Immunization History   Administered Date(s) Administered    Influenza Vaccine, unspecified formulation 10/01/2016    Pneumococcal Conjugate 13-valent (Eligah Rothman) 10/22/2016    Tdap (Boostrix, Adacel) 10/07/2020       Active Problems:  Active Problems:    HTN (hypertension)    Fracture, Colles, left, closed    Warfarin-induced coagulopathy ([facility] [address]    Closed fracture of left distal radius    Closed fracture of left distal radius and ulna, initial encounter  Resolved Problems:    * No resolved hospital problems.  *      Isolation/Infection:       Nurse Assessment:  Last Vital Signs:/74   Pulse 70   Temp 97.8 °F (36.6 °C) (Axillary)   Resp 18   Ht 5' (1.524 m)   Wt 88 lb 10 oz (40.2 kg)   SpO2 100%   BMI 17.31 kg/m²   Last documented pain score (0-10 scale): Pain Level: 4  Last Weight:   Wt Readings from Last 1 Encounters:   22 88 lb 10 oz (40.2 kg)     Mental Status:  disoriented and alert     IV Access:  - PICC - site  R Basilic, insertion date: unknown    Nursing Mobility/ADLs:  Walking   Dependent  Transfer  Dependent  Bathing  Dependent  Dressing  Dependent  Toileting  Dependent  Feeding  Dependent  Med Admin  Dependent  Med Delivery   crushed and prefers mixed with pudding or applesauce    Wound Care Documentation and Therapy:  Keep left wrist splint clean and intact. DO NOT remove        Elimination:  Urinary Catheter: None   Colostomy/Ileostomy: No  Continence: Bowel: No  Bladder: No  Date of Last BM: unknown    Intake/Output Summary (Last 24 hours)     Intake/Output Summary (Last 24 hours) at 2/2/2022 1229  Last data filed at 2/2/2022 1201  Gross per 24 hour   Intake 1060 ml   Output --   Net 1060 ml     Safety Concerns:     History of Falls (last 30 days) and At Risk for Falls    Impairments/Disabilities:      Contractures - R hand    Nutrition Therapy:  Current Nutrition Therapy: ADULT DIET; Dysphagia - Pureed  Routes of Feeding: Oral  Liquids: Thin Liquids  Daily Fluid Restriction: no  Last Modified Barium Swallow with Video (Video Swallowing Test): not done    Treatments at the Time of Hospital Discharge:   Respiratory Treatments: ***  Oxygen Therapy:  is not on home oxygen therapy. Ventilator:    - No ventilator support    Lab orders for discharge:PT/INR q Mon and Thurs        Rehab Therapies: Physical Therapy, Occupational Therapy and nursing care  Weight Bearing Status/Restrictions: NWB left arm, Left arm in sling all times unless bathing or dressing. No reaching with left arm in any direction. Other Medical Equipment (for information only, NOT a DME order): Other Treatments: ***    Patient's personal belongings (please select all that are sent with patient):  None    RN SIGNATURE:  Electronically signed by Dejon Zarate RN on 2/5/22 at 12:51 PM EST    PHYSICIAN SECTION    Prognosis: Good    Condition at Discharge: Stable    Rehab Potential (if transferring to Rehab): Good    Physician Certification: I certify the above orders, information, and transfer of Santos Zuniga is necessary for the continuing treatment of the diagnosis listed and that he requires [facility] for less 30 days.      Update Admission H&P: No change in H&P    PHYSICIAN SIGNATURE:  Electronically signed by GRAHAM Vivas CNP on 2/2/22 at 12:30 PM EST/ Dr Maureen Palmer Status Date: ***    [facility] Readmission Risk Assessment Score:    Discharging to Facility/ Agency   Name:   Address:  Phone:  Fax:    Dialysis Facility (if applicable)   Name:  Address:  Dialysis Schedule:  Phone:  Fax:    / signature: {Esignature:[id number]}          Followup Dr Urmila Can in 1-2 weeks   [place] [id number] [facility], [address] [place] [address], [phone]

## 2022-01-31 NOTE — PROGRESS NOTES
PO Vitamin K ordered. Patient sleepy and unable to take at this time. Dr. Jonathon Valle made aware. Per MD, patient was awake and alert in pre-op prior to admission. Stat CT with contrast ordered.

## 2022-01-31 NOTE — PROGRESS NOTES
Call placed to BEE Hernandez NP, states she called transfer center, hospitalist is to admit but she has not heard back from them. Call placed to nursing supervisor who is aware of situation.

## 2022-01-31 NOTE — PROGRESS NOTES
Attempted to call daughter to ask admission questions. No answer on home phone or cell phone. Unable to leave message.

## 2022-01-31 NOTE — CARE COORDINATION
INITIAL CASE MANAGEMENT ASSESSMENT    Met with patient to assess possible discharge needs. Explained Case Management role/services. Living Situation: Prior to her injury in 12/9/2021, patient lived with daughter. ADLs: daughter assisted     DME: walker, rollater, shower chair    PLAN/COMMENTS: Per daughter, patient broke her shoulder and wrist broken 12/9/21. She was at [facility] and then went to [facility] for rehab. Patient has been at that facility at that time. Plan is for patient to return to [facility] at discharge. She was paying privately prior to this hospital admission. Confirmed with [employer] that patient can return to the facility at discharge.      Electronically signed by ADAMS Lewis, DOMINGO, Case Management on 1/31/2022 at [facility] [id number]

## 2022-02-01 ENCOUNTER — ANESTHESIA EVENT (OUTPATIENT)
Dept: OPERATING ROOM | Age: 87
DRG: 511 | End: 2022-02-01
Payer: MEDICARE

## 2022-02-01 LAB
ANION GAP SERPL CALCULATED.3IONS-SCNC: 15 MMOL/L (ref 3–16)
BUN BLDV-MCNC: 23 MG/DL (ref 7–20)
CALCIUM SERPL-MCNC: 10.2 MG/DL (ref 8.3–10.6)
CHLORIDE BLD-SCNC: 106 MMOL/L (ref 99–110)
CO2: 22 MMOL/L (ref 21–32)
CREAT SERPL-MCNC: <0.5 MG/DL (ref 0.6–1.2)
GFR AFRICAN AMERICAN: >60
GFR NON-AFRICAN AMERICAN: >60
GLUCOSE BLD-MCNC: 104 MG/DL (ref 70–99)
HCT VFR BLD CALC: 47.9 % (ref 36–48)
HEMOGLOBIN: 15.8 G/DL (ref 12–16)
INR BLD: 1.86 (ref 0.88–1.12)
INR BLD: 2.94 (ref 0.88–1.12)
MCH RBC QN AUTO: 33.1 PG (ref 26–34)
MCHC RBC AUTO-ENTMCNC: 33 G/DL (ref 31–36)
MCV RBC AUTO: 100.4 FL (ref 80–100)
PDW BLD-RTO: 15 % (ref 12.4–15.4)
PLATELET # BLD: 239 K/UL (ref 135–450)
PMV BLD AUTO: 7.9 FL (ref 5–10.5)
POTASSIUM REFLEX MAGNESIUM: 3.9 MMOL/L (ref 3.5–5.1)
PROTHROMBIN TIME: 21.6 SEC (ref 9.9–12.7)
PROTHROMBIN TIME: 34.8 SEC (ref 9.9–12.7)
RBC # BLD: 4.78 M/UL (ref 4–5.2)
SODIUM BLD-SCNC: 143 MMOL/L (ref 136–145)
WBC # BLD: 7.3 K/UL (ref 4–11)

## 2022-02-01 PROCEDURE — 80048 BASIC METABOLIC PNL TOTAL CA: CPT

## 2022-02-01 PROCEDURE — 6360000002 HC RX W HCPCS: Performed by: INTERNAL MEDICINE

## 2022-02-01 PROCEDURE — 1200000000 HC SEMI PRIVATE

## 2022-02-01 PROCEDURE — 85027 COMPLETE CBC AUTOMATED: CPT

## 2022-02-01 PROCEDURE — 6370000000 HC RX 637 (ALT 250 FOR IP): Performed by: NURSE PRACTITIONER

## 2022-02-01 PROCEDURE — 85610 PROTHROMBIN TIME: CPT

## 2022-02-01 PROCEDURE — 96376 TX/PRO/DX INJ SAME DRUG ADON: CPT

## 2022-02-01 PROCEDURE — 36415 COLL VENOUS BLD VENIPUNCTURE: CPT

## 2022-02-01 PROCEDURE — G0378 HOSPITAL OBSERVATION PER HR: HCPCS

## 2022-02-01 PROCEDURE — 2580000003 HC RX 258: Performed by: INTERNAL MEDICINE

## 2022-02-01 PROCEDURE — 6370000000 HC RX 637 (ALT 250 FOR IP): Performed by: INTERNAL MEDICINE

## 2022-02-01 PROCEDURE — 96374 THER/PROPH/DIAG INJ IV PUSH: CPT

## 2022-02-01 RX ORDER — ZINC GLUCONATE 50 MG
50 TABLET ORAL DAILY
COMMUNITY
Start: 2022-01-20 | End: 2022-02-17

## 2022-02-01 RX ORDER — ACETAMINOPHEN 500 MG
1000 TABLET ORAL 3 TIMES DAILY
COMMUNITY

## 2022-02-01 RX ORDER — OXYCODONE HYDROCHLORIDE 5 MG/1
5 TABLET ORAL 2 TIMES DAILY
COMMUNITY

## 2022-02-01 RX ORDER — TRAMADOL HYDROCHLORIDE 50 MG/1
50 TABLET ORAL EVERY 6 HOURS PRN
Status: DISCONTINUED | OUTPATIENT
Start: 2022-02-01 | End: 2022-02-05 | Stop reason: HOSPADM

## 2022-02-01 RX ORDER — LISINOPRIL 10 MG/1
10 TABLET ORAL DAILY
Status: DISCONTINUED | OUTPATIENT
Start: 2022-02-02 | End: 2022-02-05 | Stop reason: HOSPADM

## 2022-02-01 RX ORDER — WHITE PETROLATUM,ZINC OXIDE 57; 17 G/100G; G/100G
PASTE TOPICAL 2 TIMES DAILY
COMMUNITY

## 2022-02-01 RX ORDER — PHYTONADIONE 5 MG/1
5 TABLET ORAL ONCE
Status: COMPLETED | OUTPATIENT
Start: 2022-02-01 | End: 2022-02-01

## 2022-02-01 RX ORDER — HYDRALAZINE HYDROCHLORIDE 20 MG/ML
10 INJECTION INTRAMUSCULAR; INTRAVENOUS EVERY 4 HOURS PRN
Status: DISCONTINUED | OUTPATIENT
Start: 2022-02-01 | End: 2022-02-05 | Stop reason: HOSPADM

## 2022-02-01 RX ORDER — ACETAMINOPHEN 160 MG
2000 TABLET,DISINTEGRATING ORAL DAILY
COMMUNITY
Start: 2022-01-20 | End: 2022-02-17

## 2022-02-01 RX ADMIN — LISINOPRIL 5 MG: 5 TABLET ORAL at 10:37

## 2022-02-01 RX ADMIN — SODIUM CHLORIDE, PRESERVATIVE FREE 10 ML: 5 INJECTION INTRAVENOUS at 10:37

## 2022-02-01 RX ADMIN — ACETAMINOPHEN 650 MG: 325 TABLET ORAL at 01:20

## 2022-02-01 RX ADMIN — SODIUM CHLORIDE, PRESERVATIVE FREE 10 ML: 5 INJECTION INTRAVENOUS at 21:50

## 2022-02-01 RX ADMIN — HYDRALAZINE HYDROCHLORIDE 10 MG: 20 INJECTION INTRAMUSCULAR; INTRAVENOUS at 01:21

## 2022-02-01 RX ADMIN — PHYTONADIONE 5 MG: 5 TABLET ORAL at 10:36

## 2022-02-01 RX ADMIN — METOPROLOL SUCCINATE 25 MG: 25 TABLET, EXTENDED RELEASE ORAL at 10:37

## 2022-02-01 RX ADMIN — LEVOTHYROXINE SODIUM 50 MCG: 50 TABLET ORAL at 06:03

## 2022-02-01 RX ADMIN — TRAMADOL HYDROCHLORIDE 50 MG: 50 TABLET ORAL at 10:36

## 2022-02-01 RX ADMIN — LATANOPROST 1 DROP: 50 SOLUTION OPHTHALMIC at 21:50

## 2022-02-01 RX ADMIN — HYDRALAZINE HYDROCHLORIDE 10 MG: 20 INJECTION INTRAMUSCULAR; INTRAVENOUS at 17:28

## 2022-02-01 ASSESSMENT — PAIN SCALES - GENERAL
PAINLEVEL_OUTOF10: 0
PAINLEVEL_OUTOF10: 1
PAINLEVEL_OUTOF10: 0
PAINLEVEL_OUTOF10: 8
PAINLEVEL_OUTOF10: 3

## 2022-02-01 ASSESSMENT — PAIN DESCRIPTION - LOCATION: LOCATION: OTHER (COMMENT)

## 2022-02-01 ASSESSMENT — PAIN DESCRIPTION - DESCRIPTORS
DESCRIPTORS: PATIENT UNABLE TO DESCRIBE
DESCRIPTORS: PATIENT UNABLE TO DESCRIBE

## 2022-02-01 ASSESSMENT — PAIN SCALES - PAIN ASSESSMENT IN ADVANCED DEMENTIA (PAINAD)
FACIALEXPRESSION: 0
CONSOLABILITY: 0
TOTALSCORE: 1
BREATHING: 0
NEGVOCALIZATION: 1
BODYLANGUAGE: 0

## 2022-02-01 ASSESSMENT — PAIN DESCRIPTION - ORIENTATION: ORIENTATION: OTHER (COMMENT)

## 2022-02-01 ASSESSMENT — PAIN DESCRIPTION - PAIN TYPE
TYPE: ACUTE PAIN
TYPE: ACUTE PAIN

## 2022-02-01 ASSESSMENT — PAIN DESCRIPTION - FREQUENCY
FREQUENCY: CONTINUOUS
FREQUENCY: CONTINUOUS

## 2022-02-01 ASSESSMENT — PAIN DESCRIPTION - PROGRESSION
CLINICAL_PROGRESSION: NOT CHANGED
CLINICAL_PROGRESSION: NOT CHANGED

## 2022-02-01 ASSESSMENT — PAIN - FUNCTIONAL ASSESSMENT
PAIN_FUNCTIONAL_ASSESSMENT: PREVENTS OR INTERFERES SOME ACTIVE ACTIVITIES AND ADLS
PAIN_FUNCTIONAL_ASSESSMENT: PREVENTS OR INTERFERES SOME ACTIVE ACTIVITIES AND ADLS

## 2022-02-01 ASSESSMENT — PAIN DESCRIPTION - ONSET
ONSET: ON-GOING
ONSET: ON-GOING

## 2022-02-01 NOTE — PROGRESS NOTES
One time order of PO Vitamin K 5mg per Ryne Reasons, NP. INR ordered for 1800. Per NP, call ortho MD if INR >1.5. Discussed pain management with Kristina. New orders for Tramadol 50mg Q6 PRN.

## 2022-02-01 NOTE — PROGRESS NOTES
[facility]   Progress Note      S/P :  SUBJECTIVE  In bed. Oriented to self only. Pain is   described in left wrist and with the intensity of \"it hurts, don't move it\". Pain is described as aching. OBJECTIVE              Physical                      VITALS:  /72   Pulse 71   Temp 97.7 °F (36.5 °C) (Oral)   Resp 18   Ht 5' (1.524 m)   Wt 115 lb 4.8 oz (52.3 kg)   SpO2 98%   BMI 22.52 kg/m²                     MUSCULOSKELETAL:  Wiggle left fingers and thumb to command. Left fingers warm and pink with brisk cap refill noted. Left wrist in splint with ACE wrist to above elbow. Left shoulder without swelling or bruising, Tender to touch.                     NEUROLOGIC:                                  Sensory:  Touch:  Left Upper Extremity:  normal                                      Data       CBC:   Lab Results   Component Value Date    WBC 7.3 02/01/2022    RBC 4.78 02/01/2022    HGB 15.8 02/01/2022    HCT 47.9 02/01/2022    .4 02/01/2022    MCH 33.1 02/01/2022    MCHC 33.0 02/01/2022    RDW 15.0 02/01/2022     02/01/2022    MPV 7.9 02/01/2022        WBC:    Lab Results   Component Value Date    WBC 7.3 02/01/2022        Hemoglobin/Hematocrit:    Lab Results   Component Value Date    HGB 15.8 02/01/2022    HCT 47.9 02/01/2022        PT/INR:    Lab Results   Component Value Date    PROTIME 34.8 02/01/2022    PROTIME 14.2 01/08/2010    INR 2.94 02/01/2022              Current Inpatient Medications             Current Facility-Administered Medications: phytonadione (VITAMIN K) tablet 5 mg, 5 mg, Oral, Once  traMADol (ULTRAM) tablet 50 mg, 50 mg, Oral, Q6H PRN  latanoprost (XALATAN) 0.005 % ophthalmic solution 1 drop, 1 drop, Both Eyes, Nightly  levothyroxine (SYNTHROID) tablet 50 mcg, 50 mcg, Oral, Daily  lisinopril (PRINIVIL;ZESTRIL) tablet 5 mg, 5 mg, Oral, Daily  metoprolol succinate (TOPROL XL) extended release tablet 25 mg, 25 mg, Oral, Daily  sodium chloride flush 0.9 % injection 5-40 mL, 5-40 mL, IntraVENous, 2 times per day  sodium chloride flush 0.9 % injection 5-40 mL, 5-40 mL, IntraVENous, PRN  ondansetron (ZOFRAN-ODT) disintegrating tablet 4 mg, 4 mg, Oral, Q8H PRN **OR** ondansetron (ZOFRAN) injection 4 mg, 4 mg, IntraVENous, Q6H PRN  polyethylene glycol (GLYCOLAX) packet 17 g, 17 g, Oral, Daily PRN  acetaminophen (TYLENOL) tablet 650 mg, 650 mg, Oral, Q6H PRN **OR** acetaminophen (TYLENOL) suppository 650 mg, 650 mg, Rectal, Q6H PRN  hydrALAZINE (APRESOLINE) injection 10 mg, 10 mg, IntraVENous, Q6H PRN    ASSESSMENT AND PLAN      Fall  Left distal radius fx, Plan ORIF tomorrow per Dr Glenroy Smiley  Left humeral head fx, sling and swathe all times unless bathing, NWB  Elevated INR on admission for outpt surgery yesterday, 4.2, Improved but still elevated this AM, 2.94, Repeat Vitamin K. Check INR at 6pm   Confusion. Reported as Acute. Metabolic? ? Hospitalist following  NPO after MN    Scoutcurly Grier, APRN - CNP  2/1/2022  10:01 AM

## 2022-02-01 NOTE — PROGRESS NOTES
Spoke with Dr. Arnold Joy regarding INR of 1.86. MD pleased with result. No new orders at this time.

## 2022-02-01 NOTE — PLAN OF CARE
Problem: Falls - Risk of:  Goal: Will remain free from falls  Description: Will remain free from falls  2/1/2022 0056 by Artemio Figueroa RN  Outcome: Ongoing  1/31/2022 1828 by Anibal Kelly RN  Outcome: Ongoing  Note: Fall risk assessment completed. Fall precautions in place. Call light within reach. Pt educated on calling for assistance before getting up. Walkway free of clutter. Will continue to monitor.   Electronically signed by Anibal Kelly RN on 1/31/2022 at 6:28 PM   Goal: Absence of physical injury  Description: Absence of physical injury  Outcome: Ongoing

## 2022-02-01 NOTE — PROGRESS NOTES
Medication Reconciliation    List of medications patient is currently taking is complete. Source of information: 1. [facility] [facility] [employer]         Notes regarding home medications:   1. Patient takes warfarin 2.5 mg daily for Afib. Dose modified  2. Added scheduled and PRN oxycodone, scheduled APAP, vitamin C, Zinc, and Phytoplex to med list  3.  Lisinopril dose increased to 10 mg daily

## 2022-02-01 NOTE — PROGRESS NOTES
Spoke with daughter Jazmin Mckeon via phone. Made aware of surgery scheduled for 0730 tomorrow. Updated on patient. Answered questions.

## 2022-02-01 NOTE — PROGRESS NOTES
INR is 1.86. Orders were to notify on call MD if > 1.5. Dr. Alison Delgado paged to notify. Waiting for response.

## 2022-02-01 NOTE — PROGRESS NOTES
without deformity. Skin: Skin color, texture, turgor normal.  No rashes or lesions. Neurologic:  Neurovascularly intact without any focal sensory/motor deficits. Cranial nerves: II-XII intact, grossly non-focal.  Psychiatric: Alert and oriented, thought content appropriate, normal insight  Capillary Refill: Brisk,3 seconds, normal   Peripheral Pulses: +2 palpable, equal bilaterally       Labs:   Recent Labs     02/01/22  0515   WBC 7.3   HGB 15.8   HCT 47.9        Recent Labs     02/01/22  0515      K 3.9      CO2 22   BUN 23*   CREATININE <0.5*   CALCIUM 10.2     No results for input(s): AST, ALT, BILIDIR, BILITOT, ALKPHOS in the last 72 hours. Recent Labs     01/31/22  0932 02/01/22  0515   INR 4.22* 2.94*     No results for input(s): Ethelene Soulier in the last 72 hours. Urinalysis:      Lab Results   Component Value Date    NITRU Negative 10/07/2020    WBCUA 8 10/07/2020    BACTERIA 4+ 05/29/2017    RBCUA 1 10/07/2020    BLOODU Negative 10/07/2020    SPECGRAV 1.012 10/07/2020    GLUCOSEU Negative 10/07/2020       Radiology:  CT HEAD WO CONTRAST   Final Result   Cerebral atrophy without acute intracranial abnormality. Assessment/Plan:    Active Hospital Problems    Diagnosis     Warfarin-induced coagulopathy ([facility] [address] [D68.32, K10.367V]     Fracture, Colles, left, closed [S52.532A]     HTN (hypertension) [I10]      INR down to 2.9, I agree with another dose of vitamin K, repeat INR n.p.o. after midnight for surgery in a.m.  BP adequately controlled, hydralazine as needed, home medications resumed      DVT Prophylaxis: no need, elevated INR  Diet: Diet NPO  ADULT DIET; Dysphagia - Pureed;  Low Fat/Low Chol/High Fiber/2 gm Na  Code Status: Full Code    PT/OT Eval Status: no need, [place] resident      Dispo - return to [place] after surgery     Anabel Collazo MD

## 2022-02-01 NOTE — PLAN OF CARE
Problem: Falls - Risk of:  Goal: Will remain free from falls  Description: Will remain free from falls  Outcome: Ongoing  Note: Fall risk assessment completed. Fall precautions in place. Call light within reach. Pt educated on calling for assistance before getting up. Walkway free of clutter. Will continue to monitor. Electronically signed by Dorian Mandel RN on 2/1/2022 at 3:58 PM      Problem: Pain:  Goal: Pain level will decrease  Description: Pain level will decrease  Outcome: Ongoing  Note: Educated patient on pain management. Will assess patients pain level per unit protocol, and provide pain management measures as needed. Electronically signed by Dorian Mandel RN on 2/1/2022 at 3:58 PM      Problem: Skin Integrity:  Goal: Will show no infection signs and symptoms  Description: Will show no infection signs and symptoms  Outcome: Ongoing  Note: Will monitor skin and mucous membeanes. Will turn patient every 2 hours, monitor for friction and sheering, and change dressings as needed. Will preform skin assessment every shift.    Electronically signed by Dorian Mandel RN on 2/1/2022 at 3:58 PM

## 2022-02-02 ENCOUNTER — APPOINTMENT (OUTPATIENT)
Dept: GENERAL RADIOLOGY | Age: 87
DRG: 511 | End: 2022-02-02
Attending: ORTHOPAEDIC SURGERY
Payer: MEDICARE

## 2022-02-02 ENCOUNTER — ANESTHESIA (OUTPATIENT)
Dept: OPERATING ROOM | Age: 87
DRG: 511 | End: 2022-02-02
Payer: MEDICARE

## 2022-02-02 VITALS
RESPIRATION RATE: 1 BRPM | DIASTOLIC BLOOD PRESSURE: 83 MMHG | TEMPERATURE: 95.9 F | SYSTOLIC BLOOD PRESSURE: 133 MMHG | OXYGEN SATURATION: 100 %

## 2022-02-02 PROBLEM — S52.502A CLOSED FRACTURE OF LEFT DISTAL RADIUS AND ULNA, INITIAL ENCOUNTER: Status: ACTIVE | Noted: 2022-02-02

## 2022-02-02 PROBLEM — S52.602A CLOSED FRACTURE OF LEFT DISTAL RADIUS AND ULNA, INITIAL ENCOUNTER: Status: ACTIVE | Noted: 2022-02-02

## 2022-02-02 PROCEDURE — 6360000002 HC RX W HCPCS: Performed by: ORTHOPAEDIC SURGERY

## 2022-02-02 PROCEDURE — 6370000000 HC RX 637 (ALT 250 FOR IP): Performed by: INTERNAL MEDICINE

## 2022-02-02 PROCEDURE — 2700000000 HC OXYGEN THERAPY PER DAY

## 2022-02-02 PROCEDURE — 2580000003 HC RX 258

## 2022-02-02 PROCEDURE — C1713 ANCHOR/SCREW BN/BN,TIS/BN: HCPCS | Performed by: ORTHOPAEDIC SURGERY

## 2022-02-02 PROCEDURE — 3700000000 HC ANESTHESIA ATTENDED CARE: Performed by: ORTHOPAEDIC SURGERY

## 2022-02-02 PROCEDURE — 6360000002 HC RX W HCPCS: Performed by: ANESTHESIOLOGY

## 2022-02-02 PROCEDURE — 7100000000 HC PACU RECOVERY - FIRST 15 MIN: Performed by: ORTHOPAEDIC SURGERY

## 2022-02-02 PROCEDURE — A4217 STERILE WATER/SALINE, 500 ML: HCPCS | Performed by: ORTHOPAEDIC SURGERY

## 2022-02-02 PROCEDURE — 25609 OPTX DST RD XART FX/EP SEP3+: CPT | Performed by: ORTHOPAEDIC SURGERY

## 2022-02-02 PROCEDURE — 0PSJ04Z REPOSITION LEFT RADIUS WITH INTERNAL FIXATION DEVICE, OPEN APPROACH: ICD-10-PCS | Performed by: ORTHOPAEDIC SURGERY

## 2022-02-02 PROCEDURE — 25609 OPTX DST RD XART FX/EP SEP3+: CPT | Performed by: NURSE PRACTITIONER

## 2022-02-02 PROCEDURE — 2500000003 HC RX 250 WO HCPCS: Performed by: ORTHOPAEDIC SURGERY

## 2022-02-02 PROCEDURE — 2580000003 HC RX 258: Performed by: ORTHOPAEDIC SURGERY

## 2022-02-02 PROCEDURE — 2709999900 HC NON-CHARGEABLE SUPPLY: Performed by: ORTHOPAEDIC SURGERY

## 2022-02-02 PROCEDURE — 3700000001 HC ADD 15 MINUTES (ANESTHESIA): Performed by: ORTHOPAEDIC SURGERY

## 2022-02-02 PROCEDURE — 6360000002 HC RX W HCPCS

## 2022-02-02 PROCEDURE — 2500000003 HC RX 250 WO HCPCS

## 2022-02-02 PROCEDURE — 3600000014 HC SURGERY LEVEL 4 ADDTL 15MIN: Performed by: ORTHOPAEDIC SURGERY

## 2022-02-02 PROCEDURE — 73100 X-RAY EXAM OF WRIST: CPT

## 2022-02-02 PROCEDURE — 94761 N-INVAS EAR/PLS OXIMETRY MLT: CPT

## 2022-02-02 PROCEDURE — 3600000004 HC SURGERY LEVEL 4 BASE: Performed by: ORTHOPAEDIC SURGERY

## 2022-02-02 PROCEDURE — 7100000001 HC PACU RECOVERY - ADDTL 15 MIN: Performed by: ORTHOPAEDIC SURGERY

## 2022-02-02 PROCEDURE — 3209999900 FLUORO FOR SURGICAL PROCEDURES

## 2022-02-02 PROCEDURE — G0378 HOSPITAL OBSERVATION PER HR: HCPCS

## 2022-02-02 PROCEDURE — 2720000010 HC SURG SUPPLY STERILE: Performed by: ORTHOPAEDIC SURGERY

## 2022-02-02 PROCEDURE — 1200000000 HC SEMI PRIVATE

## 2022-02-02 DEVICE — BONE SCREW, FULLY THREADED, T8
Type: IMPLANTABLE DEVICE | Site: RADIUS | Status: FUNCTIONAL
Brand: VARIAX

## 2022-02-02 DEVICE — LOCKING SCREW, FULLY THREADED,T8
Type: IMPLANTABLE DEVICE | Site: RADIUS | Status: FUNCTIONAL
Brand: VARIAX

## 2022-02-02 DEVICE — VOLAR DR PLATE INTERM. LEFT SHORT
Type: IMPLANTABLE DEVICE | Site: RADIUS | Status: FUNCTIONAL
Brand: VARIAX

## 2022-02-02 RX ORDER — SODIUM CHLORIDE 9 MG/ML
INJECTION, SOLUTION INTRAVENOUS CONTINUOUS PRN
Status: DISCONTINUED | OUTPATIENT
Start: 2022-02-02 | End: 2022-02-02 | Stop reason: SDUPTHER

## 2022-02-02 RX ORDER — DEXAMETHASONE SODIUM PHOSPHATE 4 MG/ML
INJECTION, SOLUTION INTRA-ARTICULAR; INTRALESIONAL; INTRAMUSCULAR; INTRAVENOUS; SOFT TISSUE PRN
Status: DISCONTINUED | OUTPATIENT
Start: 2022-02-02 | End: 2022-02-02 | Stop reason: SDUPTHER

## 2022-02-02 RX ORDER — FENTANYL CITRATE 50 UG/ML
50 INJECTION, SOLUTION INTRAMUSCULAR; INTRAVENOUS EVERY 5 MIN PRN
Status: DISCONTINUED | OUTPATIENT
Start: 2022-02-02 | End: 2022-02-02 | Stop reason: HOSPADM

## 2022-02-02 RX ORDER — GLYCOPYRROLATE 0.2 MG/ML
INJECTION INTRAMUSCULAR; INTRAVENOUS PRN
Status: DISCONTINUED | OUTPATIENT
Start: 2022-02-02 | End: 2022-02-02 | Stop reason: SDUPTHER

## 2022-02-02 RX ORDER — MEPERIDINE HYDROCHLORIDE 25 MG/ML
12.5 INJECTION INTRAMUSCULAR; INTRAVENOUS; SUBCUTANEOUS
Status: DISCONTINUED | OUTPATIENT
Start: 2022-02-02 | End: 2022-02-02 | Stop reason: HOSPADM

## 2022-02-02 RX ORDER — LIDOCAINE HYDROCHLORIDE 20 MG/ML
INJECTION, SOLUTION EPIDURAL; INFILTRATION; INTRACAUDAL; PERINEURAL PRN
Status: DISCONTINUED | OUTPATIENT
Start: 2022-02-02 | End: 2022-02-02 | Stop reason: SDUPTHER

## 2022-02-02 RX ORDER — SODIUM CHLORIDE 0.9 % (FLUSH) 0.9 %
5-40 SYRINGE (ML) INJECTION EVERY 12 HOURS SCHEDULED
Status: DISCONTINUED | OUTPATIENT
Start: 2022-02-02 | End: 2022-02-05 | Stop reason: HOSPADM

## 2022-02-02 RX ORDER — HYDROCODONE BITARTRATE AND ACETAMINOPHEN 5; 325 MG/1; MG/1
2 TABLET ORAL PRN
Status: DISCONTINUED | OUTPATIENT
Start: 2022-02-02 | End: 2022-02-02 | Stop reason: HOSPADM

## 2022-02-02 RX ORDER — FENTANYL CITRATE 50 UG/ML
INJECTION, SOLUTION INTRAMUSCULAR; INTRAVENOUS PRN
Status: DISCONTINUED | OUTPATIENT
Start: 2022-02-02 | End: 2022-02-02 | Stop reason: SDUPTHER

## 2022-02-02 RX ORDER — LABETALOL HYDROCHLORIDE 5 MG/ML
INJECTION, SOLUTION INTRAVENOUS PRN
Status: DISCONTINUED | OUTPATIENT
Start: 2022-02-02 | End: 2022-02-02 | Stop reason: SDUPTHER

## 2022-02-02 RX ORDER — FENTANYL CITRATE 50 UG/ML
25 INJECTION, SOLUTION INTRAMUSCULAR; INTRAVENOUS EVERY 5 MIN PRN
Status: DISCONTINUED | OUTPATIENT
Start: 2022-02-02 | End: 2022-02-02 | Stop reason: HOSPADM

## 2022-02-02 RX ORDER — PROMETHAZINE HYDROCHLORIDE 25 MG/ML
6.25 INJECTION, SOLUTION INTRAMUSCULAR; INTRAVENOUS
Status: DISCONTINUED | OUTPATIENT
Start: 2022-02-02 | End: 2022-02-02 | Stop reason: HOSPADM

## 2022-02-02 RX ORDER — HYDRALAZINE HYDROCHLORIDE 20 MG/ML
5 INJECTION INTRAMUSCULAR; INTRAVENOUS EVERY 10 MIN PRN
Status: DISCONTINUED | OUTPATIENT
Start: 2022-02-02 | End: 2022-02-02 | Stop reason: HOSPADM

## 2022-02-02 RX ORDER — ONDANSETRON 2 MG/ML
4 INJECTION INTRAMUSCULAR; INTRAVENOUS
Status: DISCONTINUED | OUTPATIENT
Start: 2022-02-02 | End: 2022-02-02 | Stop reason: HOSPADM

## 2022-02-02 RX ORDER — BUPIVACAINE HYDROCHLORIDE 5 MG/ML
INJECTION, SOLUTION EPIDURAL; INTRACAUDAL
Status: COMPLETED | OUTPATIENT
Start: 2022-02-02 | End: 2022-02-02

## 2022-02-02 RX ORDER — SODIUM CHLORIDE 450 MG/100ML
INJECTION, SOLUTION INTRAVENOUS CONTINUOUS
Status: DISCONTINUED | OUTPATIENT
Start: 2022-02-02 | End: 2022-02-03

## 2022-02-02 RX ORDER — SODIUM CHLORIDE 0.9 % (FLUSH) 0.9 %
5-40 SYRINGE (ML) INJECTION PRN
Status: DISCONTINUED | OUTPATIENT
Start: 2022-02-02 | End: 2022-02-05 | Stop reason: HOSPADM

## 2022-02-02 RX ORDER — HYDROCODONE BITARTRATE AND ACETAMINOPHEN 5; 325 MG/1; MG/1
1 TABLET ORAL PRN
Status: DISCONTINUED | OUTPATIENT
Start: 2022-02-02 | End: 2022-02-02 | Stop reason: HOSPADM

## 2022-02-02 RX ORDER — PROPOFOL 10 MG/ML
INJECTION, EMULSION INTRAVENOUS PRN
Status: DISCONTINUED | OUTPATIENT
Start: 2022-02-02 | End: 2022-02-02 | Stop reason: SDUPTHER

## 2022-02-02 RX ORDER — SODIUM CHLORIDE 9 MG/ML
25 INJECTION, SOLUTION INTRAVENOUS PRN
Status: DISCONTINUED | OUTPATIENT
Start: 2022-02-02 | End: 2022-02-05 | Stop reason: HOSPADM

## 2022-02-02 RX ORDER — ONDANSETRON 2 MG/ML
INJECTION INTRAMUSCULAR; INTRAVENOUS PRN
Status: DISCONTINUED | OUTPATIENT
Start: 2022-02-02 | End: 2022-02-02 | Stop reason: SDUPTHER

## 2022-02-02 RX ADMIN — PROPOFOL 80 MG: 10 INJECTION, EMULSION INTRAVENOUS at 07:44

## 2022-02-02 RX ADMIN — ONDANSETRON 4 MG: 2 INJECTION INTRAMUSCULAR; INTRAVENOUS at 07:50

## 2022-02-02 RX ADMIN — LABETALOL HYDROCHLORIDE 5 MG: 5 INJECTION, SOLUTION INTRAVENOUS at 09:01

## 2022-02-02 RX ADMIN — FENTANYL CITRATE 25 MCG: 50 INJECTION, SOLUTION INTRAMUSCULAR; INTRAVENOUS at 09:22

## 2022-02-02 RX ADMIN — SODIUM CHLORIDE, PRESERVATIVE FREE 10 ML: 5 INJECTION INTRAVENOUS at 22:12

## 2022-02-02 RX ADMIN — SODIUM CHLORIDE, PRESERVATIVE FREE 10 ML: 5 INJECTION INTRAVENOUS at 22:04

## 2022-02-02 RX ADMIN — SODIUM CHLORIDE: 9 INJECTION, SOLUTION INTRAVENOUS at 07:41

## 2022-02-02 RX ADMIN — FENTANYL CITRATE 25 MCG: 50 INJECTION INTRAMUSCULAR; INTRAVENOUS at 08:15

## 2022-02-02 RX ADMIN — FENTANYL CITRATE 50 MCG: 50 INJECTION INTRAMUSCULAR; INTRAVENOUS at 07:50

## 2022-02-02 RX ADMIN — SODIUM CHLORIDE: 4.5 INJECTION, SOLUTION INTRAVENOUS at 10:49

## 2022-02-02 RX ADMIN — FENTANYL CITRATE 25 MCG: 50 INJECTION INTRAMUSCULAR; INTRAVENOUS at 08:03

## 2022-02-02 RX ADMIN — DEXAMETHASONE SODIUM PHOSPHATE 4 MG: 4 INJECTION, SOLUTION INTRAMUSCULAR; INTRAVENOUS at 07:50

## 2022-02-02 RX ADMIN — LEVOTHYROXINE SODIUM 50 MCG: 50 TABLET ORAL at 06:13

## 2022-02-02 RX ADMIN — LABETALOL HYDROCHLORIDE 5 MG: 5 INJECTION, SOLUTION INTRAVENOUS at 08:51

## 2022-02-02 RX ADMIN — GLYCOPYRROLATE 0.2 MG: 0.2 INJECTION, SOLUTION INTRAMUSCULAR; INTRAVENOUS at 07:44

## 2022-02-02 RX ADMIN — FENTANYL CITRATE 25 MCG: 50 INJECTION, SOLUTION INTRAMUSCULAR; INTRAVENOUS at 09:31

## 2022-02-02 RX ADMIN — HYDRALAZINE HYDROCHLORIDE 10 MG: 20 INJECTION INTRAMUSCULAR; INTRAVENOUS at 10:55

## 2022-02-02 RX ADMIN — CEFAZOLIN 2000 MG: 10 INJECTION, POWDER, FOR SOLUTION INTRAVENOUS at 10:51

## 2022-02-02 RX ADMIN — LIDOCAINE HYDROCHLORIDE 80 MG: 20 INJECTION, SOLUTION EPIDURAL; INFILTRATION; INTRACAUDAL; PERINEURAL at 07:44

## 2022-02-02 RX ADMIN — LATANOPROST 1 DROP: 50 SOLUTION OPHTHALMIC at 22:03

## 2022-02-02 RX ADMIN — CEFAZOLIN 2000 MG: 10 INJECTION, POWDER, FOR SOLUTION INTRAVENOUS at 18:53

## 2022-02-02 ASSESSMENT — PAIN SCALES - PAIN ASSESSMENT IN ADVANCED DEMENTIA (PAINAD)
CONSOLABILITY: 1
NEGVOCALIZATION: 1
BREATHING: 0
BREATHING: 0
FACIALEXPRESSION: 0
TOTALSCORE: 6
FACIALEXPRESSION: 0
TOTALSCORE: 1
TOTALSCORE: 0
BODYLANGUAGE: 0
BODYLANGUAGE: 0
CONSOLABILITY: 0
NEGVOCALIZATION: 1
TOTALSCORE: 0
NEGVOCALIZATION: 0
CONSOLABILITY: 0
BODYLANGUAGE: 0
NEGVOCALIZATION: 2
BREATHING: 0
FACIALEXPRESSION: 0
BREATHING: 0
BREATHING: 0
CONSOLABILITY: 0
CONSOLABILITY: 0
TOTALSCORE: 1
FACIALEXPRESSION: 0
TOTALSCORE: 1
NEGVOCALIZATION: 2
FACIALEXPRESSION: 0
BREATHING: 0
BODYLANGUAGE: 0
FACIALEXPRESSION: 0
BREATHING: 0
BODYLANGUAGE: 0
CONSOLABILITY: 0
TOTALSCORE: 5
TOTALSCORE: 1
CONSOLABILITY: 0
NEGVOCALIZATION: 1
BODYLANGUAGE: 0
BODYLANGUAGE: 0
CONSOLABILITY: 0
BODYLANGUAGE: 0
TOTALSCORE: 1
BODYLANGUAGE: 0
BODYLANGUAGE: 1
BREATHING: 0
CONSOLABILITY: 2
NEGVOCALIZATION: 1
TOTALSCORE: 1
NEGVOCALIZATION: 1
CONSOLABILITY: 0
FACIALEXPRESSION: 0
TOTALSCORE: 1
FACIALEXPRESSION: 0
BREATHING: 0
CONSOLABILITY: 0
BREATHING: 0
FACIALEXPRESSION: 0
NEGVOCALIZATION: 1
BREATHING: 0
FACIALEXPRESSION: 0
CONSOLABILITY: 0
FACIALEXPRESSION: 0
BODYLANGUAGE: 1
BODYLANGUAGE: 0
TOTALSCORE: 1
BREATHING: 0
FACIALEXPRESSION: 2
NEGVOCALIZATION: 0

## 2022-02-02 ASSESSMENT — PULMONARY FUNCTION TESTS
PIF_VALUE: 8
PIF_VALUE: 10
PIF_VALUE: 2
PIF_VALUE: 3
PIF_VALUE: 8
PIF_VALUE: 1
PIF_VALUE: 2
PIF_VALUE: 2
PIF_VALUE: 3
PIF_VALUE: 11
PIF_VALUE: 3
PIF_VALUE: 8
PIF_VALUE: 3
PIF_VALUE: 17
PIF_VALUE: 3
PIF_VALUE: 3
PIF_VALUE: 19
PIF_VALUE: 3
PIF_VALUE: 1
PIF_VALUE: 3
PIF_VALUE: 3
PIF_VALUE: 10
PIF_VALUE: 3
PIF_VALUE: 3
PIF_VALUE: 1
PIF_VALUE: 10
PIF_VALUE: 3
PIF_VALUE: 10
PIF_VALUE: 3
PIF_VALUE: 3
PIF_VALUE: 10
PIF_VALUE: 2
PIF_VALUE: 11
PIF_VALUE: 3
PIF_VALUE: 10
PIF_VALUE: 3
PIF_VALUE: 11
PIF_VALUE: 2
PIF_VALUE: 11
PIF_VALUE: 3
PIF_VALUE: 0
PIF_VALUE: 3
PIF_VALUE: 11
PIF_VALUE: 3
PIF_VALUE: 10
PIF_VALUE: 3
PIF_VALUE: 11
PIF_VALUE: 3
PIF_VALUE: 11
PIF_VALUE: 3
PIF_VALUE: 3
PIF_VALUE: 10
PIF_VALUE: 2
PIF_VALUE: 3
PIF_VALUE: 16
PIF_VALUE: 3
PIF_VALUE: 3
PIF_VALUE: 11
PIF_VALUE: 3
PIF_VALUE: 2
PIF_VALUE: 3
PIF_VALUE: 2
PIF_VALUE: 3
PIF_VALUE: 2
PIF_VALUE: 0
PIF_VALUE: 3
PIF_VALUE: 4
PIF_VALUE: 0
PIF_VALUE: 3
PIF_VALUE: 11
PIF_VALUE: 2
PIF_VALUE: 3
PIF_VALUE: 3
PIF_VALUE: 11

## 2022-02-02 ASSESSMENT — ENCOUNTER SYMPTOMS: SHORTNESS OF BREATH: 0

## 2022-02-02 ASSESSMENT — PAIN DESCRIPTION - PROGRESSION
CLINICAL_PROGRESSION: NOT CHANGED
CLINICAL_PROGRESSION: OTHER (COMMENT)
CLINICAL_PROGRESSION: NOT CHANGED
CLINICAL_PROGRESSION: OTHER (COMMENT)
CLINICAL_PROGRESSION: NOT CHANGED
CLINICAL_PROGRESSION: NOT CHANGED

## 2022-02-02 ASSESSMENT — PAIN - FUNCTIONAL ASSESSMENT: PAIN_FUNCTIONAL_ASSESSMENT: ADVANCED DEMENTIA

## 2022-02-02 ASSESSMENT — PAIN SCALES - GENERAL
PAINLEVEL_OUTOF10: 5
PAINLEVEL_OUTOF10: 6
PAINLEVEL_OUTOF10: 4

## 2022-02-02 NOTE — PLAN OF CARE
Problem: Falls - Risk of:  Goal: Will remain free from falls  Description: Will remain free from falls  2/2/2022 1035 by Hernando Hatfield RN  Outcome: Ongoing     Problem: Pain:  Goal: Pain level will decrease  Description: Pain level will decrease  2/2/2022 1035 by Hernando Hatfield RN  Outcome: Ongoing   Pain /discomfort being managed with PRN analgesics per MD orders. Patient unable to express presence and absence of pain and rate pain appropriately using numerical scale.  FLACC scale used  Problem: Skin Integrity:  Goal: Will show no infection signs and symptoms  Description: Will show no infection signs and symptoms  Outcome: Ongoing

## 2022-02-02 NOTE — PROGRESS NOTES
[facility] Orthopedic Surgery   Progress Note      S/P :  SUBJECTIVE  Post ORIF left distal radius fx yesterday per DR Noel Cornelius. Per notes pt has several skin tears under the ACE and splint which were dressed. Left shoulder sling is off and at bedside. . Pain is   described in left wrist per pt and lesser so in left shoulder at this time and with the intensity of moderate. Pain is described as aching, shooting. She is alert and oriented to self only. OBJECTIVE              Physical                      VITALS:  /74   Pulse 70   Temp 97.8 °F (36.6 °C) (Axillary)   Resp 18   Ht 5' (1.524 m)   Wt 88 lb 10 oz (40.2 kg)   SpO2 100%   BMI 17.31 kg/m²                     MUSCULOSKELETAL:  Left fingers and thumb warm and pink with brisk cap refill noted. Moves left thumb and fingers to command. Left volar side of wrist splint with light red breakthru drainage noted. Wrapped over with clean ACE. Left arm sling applied.  Strap for swathe is missing and probably at her home,Ice pack to left wrist                 NEUROLOGIC:                                  Sensory:  Touch:  Left Upper Extremity:  normal                                      Data       CBC:   Lab Results   Component Value Date    WBC 7.3 02/01/2022    RBC 4.78 02/01/2022    HGB 15.8 02/01/2022    HCT 47.9 02/01/2022    .4 02/01/2022    MCH 33.1 02/01/2022    MCHC 33.0 02/01/2022    RDW 15.0 02/01/2022     02/01/2022    MPV 7.9 02/01/2022        WBC:    Lab Results   Component Value Date    WBC 7.3 02/01/2022        Hemoglobin/Hematocrit:    Lab Results   Component Value Date    HGB 15.8 02/01/2022    HCT 47.9 02/01/2022        PT/INR:    Lab Results   Component Value Date    PROTIME 21.6 02/01/2022    PROTIME 14.2 01/08/2010    INR 1.86 02/01/2022              Current Inpatient Medications             Current Facility-Administered Medications: 0.45 % sodium chloride infusion, , IntraVENous, Continuous  sodium chloride flush 0.9 % injection 5-40 mL, 5-40 mL, IntraVENous, 2 times per day  sodium chloride flush 0.9 % injection 5-40 mL, 5-40 mL, IntraVENous, PRN  0.9 % sodium chloride infusion, 25 mL, IntraVENous, PRN  ceFAZolin (ANCEF) 2000 mg in dextrose 5 % 100 mL IVPB, 2,000 mg, IntraVENous, Q8H  traMADol (ULTRAM) tablet 50 mg, 50 mg, Oral, Q6H PRN  lisinopril (PRINIVIL;ZESTRIL) tablet 10 mg, 10 mg, Oral, Daily  hydrALAZINE (APRESOLINE) injection 10 mg, 10 mg, IntraVENous, Q4H PRN  latanoprost (XALATAN) 0.005 % ophthalmic solution 1 drop, 1 drop, Both Eyes, Nightly  levothyroxine (SYNTHROID) tablet 50 mcg, 50 mcg, Oral, Daily  metoprolol succinate (TOPROL XL) extended release tablet 25 mg, 25 mg, Oral, Daily  sodium chloride flush 0.9 % injection 5-40 mL, 5-40 mL, IntraVENous, 2 times per day  sodium chloride flush 0.9 % injection 5-40 mL, 5-40 mL, IntraVENous, PRN  ondansetron (ZOFRAN-ODT) disintegrating tablet 4 mg, 4 mg, Oral, Q8H PRN **OR** ondansetron (ZOFRAN) injection 4 mg, 4 mg, IntraVENous, Q6H PRN  polyethylene glycol (GLYCOLAX) packet 17 g, 17 g, Oral, Daily PRN  acetaminophen (TYLENOL) tablet 650 mg, 650 mg, Oral, Q6H PRN **OR** acetaminophen (TYLENOL) suppository 650 mg, 650 mg, Rectal, Q6H PRN    ASSESSMENT AND PLAN    Fall  Left distal radius fx, Plan ORIF tomorrow per Dr Arenas Ollaure  Left humeral head fx, sling e all times unless bathing, NWB  NWB left arm  PT OT to see   for Corrina Wang in office in 1-2 weeks.      John Murillo, APRN - CNP  2/2/2022  12:24 PM

## 2022-02-02 NOTE — OP NOTE
[address] [address] [address]                                OPERATIVE REPORT    PATIENT NAME: Prasanna Foreman                  :        1928  MED REC NO:   [id number]                          ROOM:       3117  ACCOUNT NO:   [de-identified]                           ADMIT DATE: 2022  PROVIDER:     Yogesh Curry MD    DATE OF PROCEDURE:  2022    PRIMARY CARE:  Ana Vergara MD    PREOPERATIVE DIAGNOSIS:  Left distal radius three-part intra-articular  comminuted displaced fracture, over six weeks' old. POSTOPERATIVE DIAGNOSIS:  Left distal radius three-part intra-articular  comminuted displaced fracture, over six weeks' old. OPERATION PERFORMED:  Open treatment of left distal radius three-part  intra-articular fracture with open reduction and internal fixation. SURGEON:  Yogesh Curry MD    ASSISTANT:  Sofia Whitlock CNP    ANESTHESIA:  General anesthesia. ESTIMATED BLOOD LOSS:  Minimal.    COMPLICATIONS:  None. TOURNIQUET:  Left upper arm, 250 mmHg. IMPLANT USED:  Cando titanium intermediate volar locking plate with  six distal locking screws and four proximal screws. INDICATIONS:  This is a 51-year-old white female, right-hand dominant,  who sustained a fall in mid December with a left wrist and shoulder  injury. She was in the hospital at that time and she was discharged  home. Upon evaluation and on followup in the office for her wrist and  shoulder, she was found to have significantly displaced distal radius  fracture. We recommended surgical treatment. The patient at that time  was with over four weeks' old fracture. She developed COVID that needed  to wait for another 11 days and now, she is scheduled for surgery. She  is stable to proceed with surgery at this point.   All risks, benefits,  and alternatives were discussed with the patient and her daughter and  they agreed to proceed with the surgical fixation. Given the patient's over six weeks' old fracture added significant challenge to the procedure. It required significant physical and mental effort. It required 100% more time for such procedure. OPERATIVE PROCEDURE:  The patient's left wrist was marked. She received  900 mg of clindamycin IV preoperatively. The patient was then brought  to the operating room, underwent general anesthesia. A well-padded  tourniquet was placed, left upper arm. The left upper extremity was  then prepped and draped in regular sterile routine fashion. A time-out  was called to confirm the patient's name, site, and procedure. Esmarch was used for exsanguination and tourniquet was inflated to 250  mmHg. A volar approach was performed. An incision was made over the  FCR tendon and tendon sheath was opened. We then incised the pronator  quadratus muscle with the cautery. There was significant callus  formation and the fracture was almost healed. We had to use a Everett  elevator to try to break and identify the old fracture line. It was  significantly challenging, physically and mentally very difficult to try  to mobilize a healed fracture. We were carefully able to mobilize the  fracture. The patient's skin is very thin and she developed skin tear  on the dorsal aspect of her left wrist.    After we mobilized the fracture, we were able to push it back in  appropriate position. We used a plate also to do that. We put Natalee  intermediate volar plates in appropriate position. We put one K-wire  distally. After we confirmed that the plate was in good position, we  put one screw provisionally in the distal part hole to try to  stabilize the distal piece while trying to buttress the fracture with  the plate. We then placed two nonlocking screws in the shaft and by  tightening those screws, we were able to buttress the fracture and able  to reduce it in a good near anatomic position.   After we confirmed that  the fracture was in good position, we went ahead and locked it with two  more locking screws in the shaft and we added six more locking screws  distally. Overall, we were very satisfied with the near anatomic  reduction and restoration of the volar displaced fracture anatomically  in place. At this point, we let the tourniquet down and hemostasis was secured. We irrigated the incision copiously with normal saline mixed with  gentamicin. We closed the subcu with a 3-0 Vicryl and the skin with a  4-0 Monocryl. The skin tear on the back was repaired with a 4-0 nylon  as well as Steri-Strips to keep it appropriate position. A volar splint  was applied. The patient tolerated the procedure well and was taken to the Recovery  in stable condition. Juan Garduno CNP was 1st Assist given the nature of the procedure that needed advanced assistance. POSTOPERATIVE PLAN:  The patient will be readmitted as an inpatient. She is nonweightbearing on the left wrist for four to six weeks, but we  can start range of motion of the wrist in two weeks.         Yazmin Ackerman MD    D: 02/02/2022 10:11:14       T: 02/02/2022 12:51:47     [email or web address]  Job#: [id number]     Doc#: [id number]    CC:  MD Bernadette Zabala MD

## 2022-02-02 NOTE — PROGRESS NOTES
Patient returned to room [id number] from PACU via bed. Patient is alert and oriented to self only, very drowsy, withdraws from pain, call light within reach, bed/chair alarm on. TUANE KINA C/D/I and elevated with pillows. VSS and WDL.  Electronically signed by Prosper Wilkinson RN on 2/2/2022 at 10:37 AM

## 2022-02-02 NOTE — PROGRESS NOTES
Hospitalist Progress Note      PCP: Cornel Sanchez MD    Date of Admission: 1/31/2022    Chief Complaint: elevated INR     Hospital Course: 26-year-old female past medical history significant for anemia, CAD and hypertension presented for elective cath of left radial fracture. Patient noted to have elevated INR before surgery 4.2.     According to medical records she is from a nursing home. She takes warfarin for unclear reasons. Patient did not provide a much history, she appears to be pleasantly demented     Subjective: seen post op  Blood pressures very high sbp>190  Required IV labetolol in PACU      Medications:  Reviewed    Infusion Medications    sodium chloride      sodium chloride       Scheduled Medications    sodium chloride flush  5-40 mL IntraVENous 2 times per day    ceFAZolin (ANCEF) IVPB  2,000 mg IntraVENous Q8H    lisinopril  10 mg Oral Daily    latanoprost  1 drop Both Eyes Nightly    levothyroxine  50 mcg Oral Daily    metoprolol succinate  25 mg Oral Daily    sodium chloride flush  5-40 mL IntraVENous 2 times per day     PRN Meds: sodium chloride flush, sodium chloride, traMADol, hydrALAZINE, sodium chloride flush, ondansetron **OR** ondansetron, polyethylene glycol, acetaminophen **OR** acetaminophen      Intake/Output Summary (Last 24 hours) at 2/2/2022 1044  Last data filed at 2/2/2022 0958  Gross per 24 hour   Intake 700 ml   Output --   Net 700 ml       Physical Exam Performed:    BP (!) 180/92 Comment: manual  Pulse 70   Temp 97.8 °F (36.6 °C) (Axillary)   Resp 18   Ht 5' (1.524 m)   Wt 88 lb 10 oz (40.2 kg)   SpO2 100%   BMI 17.31 kg/m²     General appearance: lethargic but arouses. Mildly uncomfortable  HEENT: Pupils equal, round, and reactive to light. Conjunctivae/corneas clear. Neck: Supple, with full range of motion. No jugular venous distention. Trachea midline. Respiratory:  Normal respiratory effort.  Clear to auscultation, bilaterally without Rales/Wheezes/Rhonchi. Cardiovascular: Regular rate and rhythm with normal S1/S2 without murmurs, rubs or gallops. Abdomen: Soft, non-tender, non-distended with normal bowel sounds. Musculoskeletal: left arm in ace bandage  Skin: Skin color, texture, turgor normal.  No rashes or lesions. Neurologic:  Neurovascularly intact without any focal sensory/motor deficits. Cranial nerves: II-XII intact, grossly non-focal.  Psychiatric: Alert and oriented, thought content appropriate, normal insight  Capillary Refill: Brisk,3 seconds, normal   Peripheral Pulses: +2 palpable, equal bilaterally       Labs:   Recent Labs     02/01/22  0515   WBC 7.3   HGB 15.8   HCT 47.9        Recent Labs     02/01/22  0515      K 3.9      CO2 22   BUN 23*   CREATININE <0.5*   CALCIUM 10.2     No results for input(s): AST, ALT, BILIDIR, BILITOT, ALKPHOS in the last 72 hours. Recent Labs     01/31/22  0932 02/01/22  0515 02/01/22  1746   INR 4.22* 2.94* 1.86*     No results for input(s): Skippy Gault in the last 72 hours. Urinalysis:      Lab Results   Component Value Date    NITRU Negative 10/07/2020    WBCUA 8 10/07/2020    BACTERIA 4+ 05/29/2017    RBCUA 1 10/07/2020    BLOODU Negative 10/07/2020    SPECGRAV 1.012 10/07/2020    GLUCOSEU Negative 10/07/2020       Radiology:  XR WRIST LEFT (2 VIEWS)   Final Result      FLUORO FOR SURGICAL PROCEDURES   Final Result      CT HEAD WO CONTRAST   Final Result   Cerebral atrophy without acute intracranial abnormality. Assessment/Plan:    Active Hospital Problems    Diagnosis     Closed fracture of left distal radius [S52.502A]     Warfarin-induced coagulopathy (HCC) [D68.32, S43.912X]     Fracture, Colles, left, closed [S52.532A]     HTN (hypertension) [I10]        L distal radial fx-s/p ORIF POD#0  Doing ok  Judicious pain control.  She seems a little lethargic    HTN urgency  Add prn hydralazine  Resume home meds  Check bladder to make sure shes not retaining urine    Warfarin induced coagulopathy-  Stable. Got vitamin k to allow for surgery  Restart soon when ok with ortho  Depending on where INR is may need short bridge with lovenox    DVT Prophylaxis: no need, elevated INR  Diet: ADULT DIET;  Dysphagia - Pureed  Code Status: Full Code    PT/OT Eval Status: no need, [facility] resident      Dispo - return to [facility] after surgery     Case d/w RN  Oc Boothe MD

## 2022-02-02 NOTE — PROGRESS NOTES
To pacu from OR. Pt moaning. Dressing to left lower arm dry and intact. Fingers to left hand warm with brisk cap refill. IV infusing. Monitor in sinus rhythm.

## 2022-02-02 NOTE — PLAN OF CARE
Problem: Falls - Risk of:  Goal: Will remain free from falls  Description: Will remain free from falls  Outcome: Ongoing  Note: Will remain free from falls.      Problem: Pain:  Goal: Control of acute pain  Description: Control of acute pain  Outcome: Ongoing

## 2022-02-02 NOTE — CONSULTS
Clinical Pharmacy Note  Warfarin Consult    Kaya Jimenez is a 80 y.o. female receiving warfarin managed by pharmacy. Warfarin Indication: Afib  Target INR range: 2-3   Dose prior to admission: 2.5 mg daily    Current warfarin drug-drug interactions:    Recent Labs     01/31/22  0932 02/01/22  0515 02/01/22  1746   HGB  --  15.8  --    HCT  --  47.9  --    INR 4.22* 2.94* 1.86*       Assessment/Plan:    Patient received vitamin K 5 mg PO times two doses prior to surgery. Warfarin 2.5 mg tonight. Daily PT/INR until stable within therapeutic range. Thank you for the consult. Will continue to follow.     Boni Silvestre PharmD, BCPS  2/2/2022  3:20 PM

## 2022-02-02 NOTE — BRIEF OP NOTE
Brief Postoperative Note      Patient: Cherilyn Galeazzi  YOB: 1928  MRN: [id number]    Date of Procedure: 2/2/2022    Pre-Op Diagnosis: LEFT DISTAL RADIUS FRACTURE. Post-Op Diagnosis: Same       Procedure(s):  OPEN REDUCTION INTERNAL FIXATION LEFT DISTAL RADIUS    Surgeon(s):  Randy Cevallos MD    Assistant: Pancho Ervin [facility] [place]  Surgical Assistant: Satinder Franco    Anesthesia: Regional    Estimated Blood Loss (mL): Minimal    Complications: None    Specimens:   * No specimens in log *    Implants:  Implant Name Type Inv. Item Serial No.  Lot No. LRB No. Used Action   PLATE BNE [id number] SHT L DST RAD 11 H VOLAR INTMED VARIAX  PLATE BNE [id number] SHT L DST RAD 11 H VOLAR INTMED VARIAX  [employer] [facility]  Left 1 Implanted   SCREW BONE L14MM DIA2.7MM WR TI ALLOY LCK FULL THRD T8 DRV  SCREW BONE L14MM DIA2.7MM WR TI ALLOY LCK FULL THRD T8 DRV  [employer] [facility]  Left 1 Implanted   SCREW BNE L16MM DIA2.7MM WRST TI ALLY LORENA FULL THRD T8 DRV  SCREW BNE L16MM DIA2.7MM WRST TI ALLY LORENA FULL THRD T8 DRV  [employer] [facility]  Left 1 Implanted   SCREW BONE L18MM DIA2.7MM WR TI ALLOY LCK FULL THRD T8 DRV  SCREW BONE L18MM DIA2.7MM WR TI ALLOY LCK FULL THRD T8 DRV  [employer] [facility]  Left 5 Implanted   SCREW BNE L20MM DIA2. 7MM LORENA FULL THRD T8 DRV VARIAX  SCREW BNE L20MM DIA2. 7MM LORENA FULL THRD T8 DRV VARIAX  [employer] [facility]  Left 2 Implanted   SCREW BONE L14MM DIA2.7MM WR TI ALLOY NONLOCKING FULL THRD  SCREW BONE L14MM DIA2.7MM WR TI ALLOY NONLOCKING FULL THRD  [employer] [facility]  Left 1 Implanted   SCREW BNE L16MM DIA2.7MM WRST TI ALLY NONLOCKING FULL THRD  SCREW BNE L16MM DIA2.7MM WRST TI ALLY NONLOCKING FULL THRD  [employer] [facility]  Left 1 Implanted         Drains: * No LDAs found *    Findings: same    Electronically signed by Randy Cevallos MD on 2/2/2022 at 9:19 AM

## 2022-02-02 NOTE — ANESTHESIA POSTPROCEDURE EVALUATION
Department of Anesthesiology  Postprocedure Note    Patient: Nereyda Warren  MRN: [id number]  YOB: 1928  Date of evaluation: 2/2/2022  Time:  11:27 AM     Procedure Summary     Date: 02/02/22 Room / Location: [facility] OR [address] [place] [address]    Anesthesia Start: 0111 Anesthesia Stop: 7587    Procedure: OPEN REDUCTION INTERNAL FIXATION LEFT DISTAL RADIUS (Left ) Diagnosis: (LEFT DISTAL RADIUS FRACTURE)    Surgeons: Nnamdi Colorado MD Responsible Provider: Elisa Lamb MD    Anesthesia Type: general ASA Status: 4          Anesthesia Type: general    Mackenzie Phase I: Mackenzie Score: 7    Mackenzie Phase II:      Last vitals: Reviewed and per EMR flowsheets.        Anesthesia Post Evaluation    Patient location during evaluation: PACU  Patient participation: complete - patient participated  Level of consciousness: awake  Pain score: 4  Airway patency: patent  Nausea & Vomiting: no nausea and no vomiting  Complications: no  Cardiovascular status: blood pressure returned to baseline  Respiratory status: acceptable  Hydration status: euvolemic

## 2022-02-02 NOTE — PROGRESS NOTES
Pt moaning less frequently. Pt refuses to move fingers to left hand. Pt /124. Dr. Booker Baumgarten notified - no new orders.

## 2022-02-02 NOTE — ANESTHESIA PRE PROCEDURE
Department of Anesthesiology  Preprocedure Note       Name:  Jelly Ramon   Age:  80 y.o.  :  3/2/1928                                          MRN:  [id number]         Date:  2022      Surgeon: Sammi Velez):  Joaquin Choudhury MD    Procedure: Procedure(s):  OPEN REDUCTION INTERNAL FIXATION LEFT DISTAL RADIUS    Medications prior to admission:   Prior to Admission medications    Medication Sig Start Date End Date Taking? Authorizing Provider   Cholecalciferol (VITAMIN D3) 50 MCG ( UT) CAPS Take 2,000 Units by mouth daily 22  Historical Provider, MD   oxyCODONE (ROXICODONE) 5 MG immediate release tablet Take 5 mg by mouth 2 times daily. Historical Provider, MD   Petrolatum-Zinc Oxide (PHYTOPLEX Z-GUARD) 57-17 % PSTE Apply topically 2 times daily Apply to bilateral buttocks    Historical Provider, MD   acetaminophen (TYLENOL) 500 MG tablet Take 1,000 mg by mouth 3 times daily    Historical Provider, MD   ascorbic acid (VITAMIN C) 1000 MG tablet Take 1,000 mg by mouth daily 22  Historical Provider, MD   zinc gluconate 50 MG tablet Take 50 mg by mouth daily 22  Historical Provider, MD   clindamycin (CLEOCIN) 300 MG capsule Take 1 capsule by mouth 3 times daily for 5 days 22  Joaquin Choudhury MD   HYDROcodone-acetaminophen (NORCO) 5-325 MG per tablet Take 1 tablet by mouth every 6 hours as needed for Pain for up to 3 days. Intended supply: 3 days. Take lowest dose possible to manage pain 1/31/22 2/3/22  Joaquin Choudhury MD   menthol-zinc oxide (CALMOSEPTINE) 0.44-20.625 % OINT ointment Apply topically 4 times daily as needed Max 30 ml per day. Historical Provider, MD   oxyCODONE (ROXICODONE) 5 MG immediate release tablet Take 5 mg by mouth every 4 hours as needed.   22   Historical Provider, MD   levothyroxine (SYNTHROID) 50 MCG tablet Take 50 mcg by mouth Daily    Historical Provider, MD   lisinopril (PRINIVIL;ZESTRIL) 10 MG tablet Take 10 mg by mouth daily  6/26/17   Historical Provider, MD   warfarin (COUMADIN) 2.5 MG tablet Take 2.5 mg by mouth daily Indications: Atrial Fibrillation  4/25/17   Historical Provider, MD   latanoprost (XALATAN) 0.005 % ophthalmic solution Place 1 drop into both eyes nightly     Historical Provider, MD   nitroGLYCERIN (NITROSTAT) 0.4 MG SL tablet Place 0.4 mg under the tongue every 5 minutes as needed for Chest pain Dissolve 1 tab under tongue at first sign of chest pain. May repeat every 5 minutes until relief is obtained. If pain persists after taking 3 tabs in a 15-minute period, or the pain is different than is typically experienced, call 9-1-1 immediately. Historical Provider, MD   metoprolol succinate (TOPROL XL) 25 MG extended release tablet Take 25 mg by mouth daily    Historical Provider, MD       Current medications:    No current facility-administered medications for this visit. Current Outpatient Medications   Medication Sig Dispense Refill    clindamycin (CLEOCIN) 300 MG capsule Take 1 capsule by mouth 3 times daily for 5 days 15 capsule 0    HYDROcodone-acetaminophen (NORCO) 5-325 MG per tablet Take 1 tablet by mouth every 6 hours as needed for Pain for up to 3 days. Intended supply: 3 days.  Take lowest dose possible to manage pain 12 tablet 0     Facility-Administered Medications Ordered in Other Visits   Medication Dose Route Frequency Provider Last Rate Last Admin    traMADol (ULTRAM) tablet 50 mg  50 mg Oral Q6H PRN GRAHAM Godinez - CNP   50 mg at 02/01/22 1036    lisinopril (PRINIVIL;ZESTRIL) tablet 10 mg  10 mg Oral Daily Cassidy Starks MD        hydrALAZINE (APRESOLINE) injection 10 mg  10 mg IntraVENous Q4H PRN Cassidy Starks MD   10 mg at 02/01/22 1728    latanoprost (XALATAN) 0.005 % ophthalmic solution 1 drop  1 drop Both Eyes Nightly Cassidy Starks MD   1 drop at 02/01/22 2150    levothyroxine (SYNTHROID) tablet 50 mcg  50 mcg Oral Daily Terrie Marrero Michael William MD   50 mcg at 02/02/22 5712    metoprolol succinate (TOPROL XL) extended release tablet 25 mg  25 mg Oral Daily Loren Friend MD   25 mg at 02/01/22 1037    sodium chloride flush 0.9 % injection 5-40 mL  5-40 mL IntraVENous 2 times per day Loren Friend MD   10 mL at 02/01/22 2150    sodium chloride flush 0.9 % injection 5-40 mL  5-40 mL IntraVENous PRN Loren Friend MD        ondansetron (ZOFRAN-ODT) disintegrating tablet 4 mg  4 mg Oral Q8H PRN Loren Friend MD        Or    ondansetron [facility]) injection 4 mg  4 mg IntraVENous Q6H PRN Loren Friend MD        polyethylene glycol [facility]) packet 17 g  17 g Oral Daily PRN Loren Friend MD        acetaminophen (TYLENOL) tablet 650 mg  650 mg Oral Q6H PRN Loren Friend MD   650 mg at 02/01/22 0120    Or    acetaminophen (TYLENOL) suppository 650 mg  650 mg Rectal Q6H PRN Loren Friend MD           Allergies:     Allergies   Allergen Reactions    Doxycycline Nausea Only    Erythromycin Hives    Penicillins Hives    Propoxyphene Hives    Sulfa Antibiotics Hives       Problem List:    Patient Active Problem List   Diagnosis Code    HTN (hypertension) I10    Hyperkalemia E87.5    Knee pain M25.569    Atrial fibrillation with RVR ([facility]) I48.91    5/25/17 RIGHT hip hemiarthroplasty S72.001A    Dementia ([facility]) F03.90    GERD (gastroesophageal reflux disease) K21.9    Chronic atrial fibrillation ([facility]) I48.20    Cardiomyopathy, ischemic I25.5    Chronic anticoagulation Z79.01    Generalized weakness R53.1    Hypertensive urgency I16.0    Closed fracture of proximal end of left humerus S42.202A    Fracture, Colles, left, closed S52.532A    Warfarin-induced coagulopathy ([facility]) D68.32, T45.515A    Elevated INR R79.1       Past Medical History:        Diagnosis Date    Anemia     CAD (coronary artery disease)     Glaucoma     High blood pressure     Myocardial infarction [facility])        Past Surgical History:        Procedure Laterality Date    APPENDECTOMY      HIP ARTHROPLASTY Right 05/25/2017    RIGHT BIPOLAR HIP HEMIARTHROPLASTY        PACEMAKER INSERTION         Social History:    Social History     Tobacco Use    Smoking status: Never Smoker    Smokeless tobacco: Never Used   Substance Use Topics    Alcohol use: Not Currently     Alcohol/week: 0.0 standard drinks     Comment: occasionally                                Counseling given: Not Answered      Vital Signs (Current): There were no vitals filed for this visit.                                            BP Readings from Last 3 Encounters:   02/02/22 (!) 181/108   12/14/21 120/75   10/09/20 115/83       NPO Status:    >8h                                                                               BMI:   Wt Readings from Last 3 Encounters:   02/02/22 88 lb 10 oz (40.2 kg)   01/19/22 115 lb (52.2 kg)   01/10/22 115 lb (52.2 kg)     There is no height or weight on file to calculate BMI.    CBC:   Lab Results   Component Value Date    WBC 7.3 02/01/2022    RBC 4.78 02/01/2022    HGB 15.8 02/01/2022    HCT 47.9 02/01/2022    .4 02/01/2022    RDW 15.0 02/01/2022     02/01/2022       CMP:   Lab Results   Component Value Date     02/01/2022    K 3.9 02/01/2022     02/01/2022    CO2 22 02/01/2022    BUN 23 02/01/2022    CREATININE <0.5 02/01/2022    GFRAA >60 02/01/2022    GFRAA >60 12/04/2012    AGRATIO 1.5 10/07/2020    LABGLOM >60 02/01/2022    GLUCOSE 104 02/01/2022    PROT 6.8 10/07/2020    CALCIUM 10.2 02/01/2022    BILITOT 0.6 10/07/2020    ALKPHOS 83 10/07/2020    AST 28 10/07/2020    ALT 16 10/07/2020       POC Tests:   Recent Labs     01/31/22  1845   POCGLU 110*       Coags:   Lab Results   Component Value Date    PROTIME 21.6 02/01/2022    PROTIME 14.2 01/08/2010    INR 1.86 02/01/2022    APTT 36.1 12/04/2012       HCG (If Applicable): No results found for: PREGTESTUR, PREGSERUM, HCG, HCGQUANT     ABGs:   Lab Results   Component Value Date    PHART 7.462 01/31/2022    PO2ART 77.9 01/31/2022    GRS4POU 37.5 01/31/2022    JOO8NMT 26.8 01/31/2022    BEART 3.0 01/31/2022    M2LJCGRY 96.4 01/31/2022        Type & Screen (If Applicable):  No results found for: LABABO, LABRH    Drug/Infectious Status (If Applicable):  No results found for: HIV, HEPCAB    COVID-19 Screening (If Applicable):   Lab Results   Component Value Date    COVID19 Not Detected 10/09/2020           Anesthesia Evaluation  Patient summary reviewed no history of anesthetic complications:   Airway: Mallampati: Unable to assess / NA        Dental:      Comment: Missing teeth    Pulmonary: breath sounds clear to auscultation      (-) COPD, asthma, shortness of breath, recent URI and sleep apnea                          ROS comment: covid 19 1/20/22   Cardiovascular:    (+) hypertension:, valvular problems/murmurs (TR):, pacemaker: pacemaker, past MI:, CAD:, dysrhythmias: atrial fibrillation, CHF:,         Rhythm: regular  Rate: normal  Echocardiogram reviewed    Cleared by cardiology           ROS comment: Cardiology states patient is high risk      Neuro/Psych:   (+) psychiatric history:   (-) seizures, neuromuscular disease and TIA            ROS comment: Dementia   GI/Hepatic/Renal:   (+) GERD:,      (-) PUD, hepatitis and liver disease       Endo/Other:    (+) hypothyroidism, blood dyscrasia (patient remains on coumadin. Last dose yesterday)::., .    (-) diabetes mellitus               Abdominal:             Vascular: Other Findings:               Anesthesia Plan      general     ASA 4     (Extensive discussion with Daughter Magda Diaz via phone. Daughter wants DNR suspended for surgery. O)  Induction: intravenous. MIPS: Postoperative opioids intended and Prophylactic antiemetics administered.   Anesthetic plan and risks discussed with healthcare power of  and child/children.                       Elma Wells MD   2/2/2022

## 2022-02-03 LAB
HCT VFR BLD CALC: 41.2 % (ref 36–48)
HEMOGLOBIN: 13.8 G/DL (ref 12–16)
INR BLD: 1.45 (ref 0.88–1.12)
PROTHROMBIN TIME: 16.7 SEC (ref 9.9–12.7)

## 2022-02-03 PROCEDURE — 94761 N-INVAS EAR/PLS OXIMETRY MLT: CPT

## 2022-02-03 PROCEDURE — 9990000010 HC NO CHARGE VISIT

## 2022-02-03 PROCEDURE — 36415 COLL VENOUS BLD VENIPUNCTURE: CPT

## 2022-02-03 PROCEDURE — 6370000000 HC RX 637 (ALT 250 FOR IP): Performed by: ORTHOPAEDIC SURGERY

## 2022-02-03 PROCEDURE — 2580000003 HC RX 258: Performed by: ORTHOPAEDIC SURGERY

## 2022-02-03 PROCEDURE — 85014 HEMATOCRIT: CPT

## 2022-02-03 PROCEDURE — 85610 PROTHROMBIN TIME: CPT

## 2022-02-03 PROCEDURE — 85018 HEMOGLOBIN: CPT

## 2022-02-03 PROCEDURE — G0378 HOSPITAL OBSERVATION PER HR: HCPCS

## 2022-02-03 PROCEDURE — 94760 N-INVAS EAR/PLS OXIMETRY 1: CPT

## 2022-02-03 PROCEDURE — 1200000000 HC SEMI PRIVATE

## 2022-02-03 PROCEDURE — 6370000000 HC RX 637 (ALT 250 FOR IP): Performed by: NURSE PRACTITIONER

## 2022-02-03 RX ORDER — WARFARIN SODIUM 2.5 MG/1
2.5 TABLET ORAL
Status: COMPLETED | OUTPATIENT
Start: 2022-02-03 | End: 2022-02-03

## 2022-02-03 RX ADMIN — SODIUM CHLORIDE: 4.5 INJECTION, SOLUTION INTRAVENOUS at 00:36

## 2022-02-03 RX ADMIN — TRAMADOL HYDROCHLORIDE 50 MG: 50 TABLET ORAL at 00:36

## 2022-02-03 RX ADMIN — LEVOTHYROXINE SODIUM 50 MCG: 50 TABLET ORAL at 05:59

## 2022-02-03 RX ADMIN — LISINOPRIL 10 MG: 10 TABLET ORAL at 09:45

## 2022-02-03 RX ADMIN — TRAMADOL HYDROCHLORIDE 50 MG: 50 TABLET ORAL at 09:45

## 2022-02-03 RX ADMIN — METOPROLOL SUCCINATE 25 MG: 25 TABLET, EXTENDED RELEASE ORAL at 09:45

## 2022-02-03 RX ADMIN — WARFARIN SODIUM 2.5 MG: 2.5 TABLET ORAL at 18:41

## 2022-02-03 RX ADMIN — SODIUM CHLORIDE, PRESERVATIVE FREE 10 ML: 5 INJECTION INTRAVENOUS at 09:45

## 2022-02-03 ASSESSMENT — PAIN DESCRIPTION - FREQUENCY
FREQUENCY: CONTINUOUS

## 2022-02-03 ASSESSMENT — PAIN SCALES - PAIN ASSESSMENT IN ADVANCED DEMENTIA (PAINAD)
TOTALSCORE: 0
BODYLANGUAGE: 0
TOTALSCORE: 0
CONSOLABILITY: 0
NEGVOCALIZATION: 0
CONSOLABILITY: 0
BREATHING: 0
NEGVOCALIZATION: 0
TOTALSCORE: 0
BREATHING: 0
TOTALSCORE: 0
FACIALEXPRESSION: 0
CONSOLABILITY: 0
BREATHING: 0
NEGVOCALIZATION: 0
NEGVOCALIZATION: 1
TOTALSCORE: 0
BODYLANGUAGE: 0
BODYLANGUAGE: 0
FACIALEXPRESSION: 2
CONSOLABILITY: 0
BODYLANGUAGE: 2
CONSOLABILITY: 0
CONSOLABILITY: 0
NEGVOCALIZATION: 0
TOTALSCORE: 5
BREATHING: 0
BODYLANGUAGE: 0
NEGVOCALIZATION: 0
BREATHING: 0
BREATHING: 0
BODYLANGUAGE: 0

## 2022-02-03 ASSESSMENT — PAIN DESCRIPTION - ONSET
ONSET: GRADUAL
ONSET: ON-GOING
ONSET: ON-GOING

## 2022-02-03 ASSESSMENT — PAIN SCALES - GENERAL
PAINLEVEL_OUTOF10: 0
PAINLEVEL_OUTOF10: 4
PAINLEVEL_OUTOF10: 0
PAINLEVEL_OUTOF10: 5

## 2022-02-03 ASSESSMENT — PAIN DESCRIPTION - LOCATION
LOCATION: ARM

## 2022-02-03 ASSESSMENT — PAIN DESCRIPTION - PROGRESSION
CLINICAL_PROGRESSION: OTHER (COMMENT)
CLINICAL_PROGRESSION: GRADUALLY WORSENING
CLINICAL_PROGRESSION: GRADUALLY IMPROVING

## 2022-02-03 ASSESSMENT — PAIN DESCRIPTION - DESCRIPTORS
DESCRIPTORS: PATIENT UNABLE TO DESCRIBE

## 2022-02-03 ASSESSMENT — PAIN - FUNCTIONAL ASSESSMENT
PAIN_FUNCTIONAL_ASSESSMENT: PREVENTS OR INTERFERES SOME ACTIVE ACTIVITIES AND ADLS

## 2022-02-03 ASSESSMENT — PAIN DESCRIPTION - ORIENTATION
ORIENTATION: RIGHT

## 2022-02-03 ASSESSMENT — PAIN DESCRIPTION - PAIN TYPE
TYPE: ACUTE PAIN;SURGICAL PAIN

## 2022-02-03 NOTE — PROGRESS NOTES
Physical Therapy    Riddhi Martel  2/3/2022  To room along with OT to patient sleeping in bed. Able to awaken by voice but she does not become alert sufficient to engage for PT OT assessments. Additional attempts to engage further met with confused resistance. Will attempt later as schedule permits. Will need continued PTOT at discharge - prior to her fall yielding the left proximal humeral and left wrist fractures she was a dedicated walker user.   Electronically signed by Philip Goodman PT on 2/3/2022 at 10:24 AM

## 2022-02-03 NOTE — PLAN OF CARE
Problem: Falls - Risk of:  Goal: Will remain free from falls  Description: Will remain free from falls  Outcome: Ongoing  Note: Fall risk assessment completed . Fall precautions in place, bed/ chair alarm on, side rails 2/4 up, call light in reach, educated pt on calling for assistance when needed, room clear of clutter. Pt verbalized understanding. Problem: Falls - Risk of:  Goal: Absence of physical injury  Description: Absence of physical injury  Outcome: Ongoing  Note: Patient remains free from physical injury. Patient educated on safety precautions. Will continue to monitor to ensure patient remains free from physical injury throughout remainder of shift. Problem: Pain:  Goal: Pain level will decrease  Description: Pain level will decrease  Outcome: Ongoing  Note: Pt educated to attempt non-phagological method of pain control, but it it becomes too strong use PRN analgesics. Pain and discomfort being managed PRN analgesics per MD orders. Pt able to express presence of pain. Problem: Pain:  Goal: Control of acute pain  Description: Control of acute pain  Outcome: Ongoing  Note: Patient educated on acute pain. Taught patient to use call light to ask for pain medication. PRN pain medication given for acute pain. Will continue to monitor pain per unit protocol. Problem: Pain:  Goal: Control of chronic pain  Description: Control of chronic pain  Outcome: Ongoing  Note: Patient educated on chronic pain. Taught patient to use call light to ask for pain medication. PRN pain medication given for chronic pain. Will continue to monitor pain per unit protocol. Problem: Skin Integrity:  Goal: Will show no infection signs and symptoms  Description: Will show no infection signs and symptoms  Outcome: Ongoing  Note: Assessment of surgical site complete. No signs of redness, warmth or swelling noted. Afebrile. Education provided on signs and symptoms of surgical site infections.       Problem: Skin Integrity:  Goal: Absence of new skin breakdown  Description: Absence of new skin breakdown  Outcome: Ongoing  Note: Skin assessment complete. No new signs of skin breakdown noted. Repositioning patient with pillows at two hour intervals. Heels elevated off bed.

## 2022-02-03 NOTE — PROGRESS NOTES
Patient moaning after being turned. Pt medicated for lt arm pain. Ice pack applied to lt arm and placed on pillow.

## 2022-02-03 NOTE — CONSULTS
Clinical Pharmacy Note  Warfarin Consult    Rosana Sal is a 80 y.o. female receiving warfarin managed by pharmacy. Warfarin Indication: Afib  Target INR range: 2-3   Dose prior to admission: 2.5 mg daily    Current warfarin drug-drug interactions:    Recent Labs     02/01/22  0515 02/01/22  1746 02/03/22  0609   HGB 15.8  --  13.8   HCT 47.9  --  41.2   INR 2.94* 1.86* 1.45*       Assessment/Plan:    Patient received vitamin K 5 mg PO times two doses prior to surgery. Warfarin 2.5 mg tonight. Daily PT/INR until stable within therapeutic range. Thank you for the consult. Will continue to follow.     Fatemeh Wood, [address] [place] [address], [address] [place] [address] 2/3/2022 9:36 AM

## 2022-02-03 NOTE — PROGRESS NOTES
Hospitalist Progress Note      PCP: Zaria Myers MD    Date of Admission: 1/31/2022    Chief Complaint: elevated INR     Hospital Course: 80-year-old female past medical history significant for anemia, CAD and hypertension presented for elective cath of left radial fracture. Patient noted to have elevated INR before surgery 4.2.     According to medical records she is from a nursing home. She takes warfarin for unclear reasons. Patient did not provide a much history, she appears to be pleasantly demented     Subjective:  Looks alert. No cp . No sob. RN reports appetite is ok      Medications:  Reviewed    Infusion Medications    sodium chloride 75 mL/hr at 02/03/22 0606    sodium chloride       Scheduled Medications    warfarin  2.5 mg Oral Once    sodium chloride flush  5-40 mL IntraVENous 2 times per day    warfarin placeholder: dosing by pharmacy   Other RX Placeholder    lisinopril  10 mg Oral Daily    latanoprost  1 drop Both Eyes Nightly    levothyroxine  50 mcg Oral Daily    metoprolol succinate  25 mg Oral Daily    sodium chloride flush  5-40 mL IntraVENous 2 times per day     PRN Meds: sodium chloride flush, sodium chloride, traMADol, hydrALAZINE, sodium chloride flush, ondansetron **OR** ondansetron, polyethylene glycol, acetaminophen **OR** acetaminophen      Intake/Output Summary (Last 24 hours) at 2/3/2022 1018  Last data filed at 2/3/2022 0606  Gross per 24 hour   Intake 2034.19 ml   Output --   Net 2034.19 ml       Physical Exam Performed:    /67   Pulse 70   Temp 96.3 °F (35.7 °C) (Axillary)   Resp 17   Ht 5' (1.524 m)   Wt 91 lb 11.4 oz (41.6 kg)   SpO2 95%   BMI 17.91 kg/m²     General appearance: lethargic but arouses. Mildly uncomfortable  HEENT: Pupils equal, round, and reactive to light. Conjunctivae/corneas clear. Neck: Supple, with full range of motion. No jugular venous distention. Trachea midline. Respiratory:  Normal respiratory effort.  Clear to auscultation, bilaterally without Rales/Wheezes/Rhonchi. Cardiovascular: Regular rate and rhythm with normal S1/S2 without murmurs, rubs or gallops. Abdomen: Soft, non-tender, non-distended with normal bowel sounds. Musculoskeletal: left arm in ace bandage  Skin: Skin color, texture, turgor normal.  No rashes or lesions. Neurologic:  Neurovascularly intact without any focal sensory/motor deficits. Cranial nerves: II-XII intact, grossly non-focal.  Psychiatric: Alert and oriented, thought content appropriate, normal insight  Capillary Refill: Brisk,3 seconds, normal   Peripheral Pulses: +2 palpable, equal bilaterally       Labs:   Recent Labs     02/01/22  0515 02/03/22  0609   WBC 7.3  --    HGB 15.8 13.8   HCT 47.9 41.2     --      Recent Labs     02/01/22  0515      K 3.9      CO2 22   BUN 23*   CREATININE <0.5*   CALCIUM 10.2     No results for input(s): AST, ALT, BILIDIR, BILITOT, ALKPHOS in the last 72 hours. Recent Labs     02/01/22  0515 02/01/22  1746 02/03/22  0609   INR 2.94* 1.86* 1.45*     No results for input(s): Bennetta Downy in the last 72 hours. Urinalysis:      Lab Results   Component Value Date    NITRU Negative 10/07/2020    WBCUA 8 10/07/2020    BACTERIA 4+ 05/29/2017    RBCUA 1 10/07/2020    BLOODU Negative 10/07/2020    SPECGRAV 1.012 10/07/2020    GLUCOSEU Negative 10/07/2020       Radiology:  XR WRIST LEFT (2 VIEWS)   Final Result      FLUORO FOR SURGICAL PROCEDURES   Final Result      CT HEAD WO CONTRAST   Final Result   Cerebral atrophy without acute intracranial abnormality.                  Assessment/Plan:    Active Hospital Problems    Diagnosis     Closed fracture of left distal radius and ulna, initial encounter [S52.502A, S52.602A]     Closed fracture of left distal radius [S52.502A]     Warfarin-induced coagulopathy (HCC) [D68.32, C65.522F]     Fracture, Colles, left, closed [S52.532A]     HTN (hypertension) [I10]        L distal radial fx-s/p ORIF POD#1  Doing ok  More alert today  No pain  Continue present management  Dc planning    HTN urgency  Resolved. Blood pressure better controlled  Add prn hydralazine  Resume home meds  Check bladder to make sure shes not retaining urine    Warfarin induced coagulopathy-  Stable. Got vitamin k to allow for surgery  Restarted back on coumadin    FEN-  Appetite good. No issues  Stop maintenance ivf    DVT Prophylaxis: no need, elevated INR  Diet: ADULT DIET; Dysphagia - Pureed  Code Status: Full Code    PT/OT Eval Status: no need, nh resident      [address] soon.  Will d/w ortho    Case d/w RN  Erika Novak MD

## 2022-02-03 NOTE — PROGRESS NOTES
Occupational Therapy    02/03/22    Kaykay Martel  3/2/1928  [id number]    OT orders received. Patient chart reviewed. OT attempted to see for OT salbador at this time. Pt sleeping upon arrival and wakes to loud voice, however, does not open eyes and keeps saying \"No\" to offering for OOB activity, sitting EOB and repositioning in bed. Pt with oxygen off nose upon arrival. This therapist places back on pt, pt able to leave on for ~1 min then takes off. This therapist attempts to place back on nose, pt upset and pushes therapist's hand away. Pt left sleeping in bed with all needs met and call light within reach. RN aware. Will attempt again as therapy schedule permits. Anticipate pt will need skilled care at d/c.      Electronically signed by MEREDITH Ritter OTR/L on 2/3/2022 at 10:20 AM

## 2022-02-03 NOTE — PROGRESS NOTES
Patient in bed, responds to name when called, alert to self. Disoriented to time, place and situation. Had difficulties getting patient to take medications this morning. Tried putting in applesauce and giving liquids. Patient tried spitting it out. Patient morning assessment completed. Patient groaning and making a grimacing face, PRN pain medication given as ordered. Patient IV infusing as prescribed. Patient constantly yelling get out and get away while trying to give care. Will continue to round on patient per unit protocol. Call light and bedside table within reach. Will continue to monitor and reassess.   Electronically signed by Emily Mobley RN on 2/3/2022 at 11:51 AM

## 2022-02-04 LAB
CULTURE, BLOOD 2: NORMAL
INR BLD: 1.45 (ref 0.88–1.12)
PROTHROMBIN TIME: 16.6 SEC (ref 9.9–12.7)

## 2022-02-04 PROCEDURE — 9990000010 HC NO CHARGE VISIT

## 2022-02-04 PROCEDURE — 1200000000 HC SEMI PRIVATE

## 2022-02-04 PROCEDURE — 85610 PROTHROMBIN TIME: CPT

## 2022-02-04 PROCEDURE — 2580000003 HC RX 258: Performed by: ORTHOPAEDIC SURGERY

## 2022-02-04 PROCEDURE — 97530 THERAPEUTIC ACTIVITIES: CPT

## 2022-02-04 PROCEDURE — 36415 COLL VENOUS BLD VENIPUNCTURE: CPT

## 2022-02-04 PROCEDURE — 97162 PT EVAL MOD COMPLEX 30 MIN: CPT

## 2022-02-04 PROCEDURE — 94761 N-INVAS EAR/PLS OXIMETRY MLT: CPT

## 2022-02-04 PROCEDURE — 6370000000 HC RX 637 (ALT 250 FOR IP): Performed by: ORTHOPAEDIC SURGERY

## 2022-02-04 PROCEDURE — G0378 HOSPITAL OBSERVATION PER HR: HCPCS

## 2022-02-04 PROCEDURE — 6370000000 HC RX 637 (ALT 250 FOR IP)

## 2022-02-04 PROCEDURE — 97166 OT EVAL MOD COMPLEX 45 MIN: CPT

## 2022-02-04 RX ORDER — WARFARIN SODIUM 3 MG/1
3 TABLET ORAL
Status: COMPLETED | OUTPATIENT
Start: 2022-02-04 | End: 2022-02-04

## 2022-02-04 RX ADMIN — TRAMADOL HYDROCHLORIDE 50 MG: 50 TABLET ORAL at 09:00

## 2022-02-04 RX ADMIN — METOPROLOL SUCCINATE 25 MG: 25 TABLET, EXTENDED RELEASE ORAL at 10:47

## 2022-02-04 RX ADMIN — LATANOPROST 1 DROP: 50 SOLUTION OPHTHALMIC at 00:01

## 2022-02-04 RX ADMIN — WARFARIN SODIUM 3 MG: 3 TABLET ORAL at 17:47

## 2022-02-04 RX ADMIN — SODIUM CHLORIDE, PRESERVATIVE FREE 10 ML: 5 INJECTION INTRAVENOUS at 22:11

## 2022-02-04 RX ADMIN — SODIUM CHLORIDE, PRESERVATIVE FREE 10 ML: 5 INJECTION INTRAVENOUS at 09:00

## 2022-02-04 RX ADMIN — LATANOPROST 1 DROP: 50 SOLUTION OPHTHALMIC at 22:11

## 2022-02-04 RX ADMIN — LISINOPRIL 10 MG: 10 TABLET ORAL at 10:48

## 2022-02-04 RX ADMIN — LEVOTHYROXINE SODIUM 50 MCG: 50 TABLET ORAL at 05:22

## 2022-02-04 ASSESSMENT — PAIN SCALES - PAIN ASSESSMENT IN ADVANCED DEMENTIA (PAINAD)
FACIALEXPRESSION: 1
FACIALEXPRESSION: 0
CONSOLABILITY: 1
BODYLANGUAGE: 0
NEGVOCALIZATION: 1
CONSOLABILITY: 1
BREATHING: 0
BREATHING: 0
CONSOLABILITY: 0
BODYLANGUAGE: 0
NEGVOCALIZATION: 1
TOTALSCORE: 3
TOTALSCORE: 3
FACIALEXPRESSION: 2
BREATHING: 0
BODYLANGUAGE: 0
TOTALSCORE: 1
NEGVOCALIZATION: 0

## 2022-02-04 ASSESSMENT — PAIN DESCRIPTION - DESCRIPTORS
DESCRIPTORS: PATIENT UNABLE TO DESCRIBE

## 2022-02-04 ASSESSMENT — PAIN SCALES - GENERAL
PAINLEVEL_OUTOF10: 3
PAINLEVEL_OUTOF10: 1
PAINLEVEL_OUTOF10: 3

## 2022-02-04 ASSESSMENT — PAIN DESCRIPTION - LOCATION
LOCATION: ARM

## 2022-02-04 ASSESSMENT — PAIN DESCRIPTION - ORIENTATION
ORIENTATION: RIGHT

## 2022-02-04 ASSESSMENT — PAIN - FUNCTIONAL ASSESSMENT
PAIN_FUNCTIONAL_ASSESSMENT: PREVENTS OR INTERFERES SOME ACTIVE ACTIVITIES AND ADLS

## 2022-02-04 ASSESSMENT — PAIN DESCRIPTION - PAIN TYPE
TYPE: SURGICAL PAIN

## 2022-02-04 NOTE — PROGRESS NOTES
Physical Therapy    Veronica Martel  2/4/2022  To room along with OT to patient sleeping. Does awaken and answer to voice but does not open eyes- she refuses any assistance to reposition or engage to sit to EOB, etc.  Discussed with nursing to call PTOT if she is more awake, alert later so we may re-attempt to mobilize.   Electronically signed by Tim Edwards PT on 2/4/2022 at 7:50 AM

## 2022-02-04 NOTE — PROGRESS NOTES
Occupational Therapy    02/04/22    [facility]  3/2/1928  [id number]    OT orders received. Patient chart reviewed. OT attempted to see for OT eval at this time with PT. Pt resting in bed upon arrival sleeping, awakens to voice without opening eyes. Pt refusing breakfast, sitting up, and repositioning. Discussed with nursing to call PTOT if she is more awake, alert later so we may re-attempt to mobilize.     Electronically signed by MEREDITH Singh OTR/L on 2/4/2022 at 7:50 AM

## 2022-02-04 NOTE — PROGRESS NOTES
Patient in bed, awake, alert to self. Patient took morning medications crushed in apple sauce. Patient tolerated well. Also, patient tolerating PO intake well. No n/v/d noted. Patient IV flushed and locked. Patient arm still has some moderate amount, arm repositioned in sling and elevated on pillow. Patient voiding adequately. Will continue to round on patient per unit protocol. Call light and bedside within reach. Will continue to monitor and reassess.

## 2022-02-04 NOTE — PROGRESS NOTES
Hospitalist Progress Note      PCP: Rena Pulido MD    Date of Admission: 1/31/2022    Chief Complaint: elevated INR     Hospital Course: 31-year-old female past medical history significant for anemia, CAD and hypertension presented for elective cath of left radial fracture. Patient noted to have elevated INR before surgery 4.2.     According to medical records she is from a nursing home. She takes warfarin for unclear reasons. 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 Awake, alert. Left hand pain under control. Refused PT/OT. Instructed to work with PT/OT. She agreed. Medications:  Reviewed    Infusion Medications    sodium chloride       Scheduled Medications    sodium chloride flush  5-40 mL IntraVENous 2 times per day    warfarin placeholder: dosing by pharmacy   Other RX Placeholder    lisinopril  10 mg Oral Daily    latanoprost  1 drop Both Eyes Nightly    levothyroxine  50 mcg Oral Daily    metoprolol succinate  25 mg Oral Daily    sodium chloride flush  5-40 mL IntraVENous 2 times per day     PRN Meds: sodium chloride flush, sodium chloride, traMADol, hydrALAZINE, sodium chloride flush, ondansetron **OR** ondansetron, polyethylene glycol, acetaminophen **OR** acetaminophen    No intake or output data in the 24 hours ending 02/04/22 0757    Physical Exam Performed:    BP (!) 160/94   Pulse 70   Temp 97.4 °F (36.3 °C) (Oral)   Resp 18   Ht 5' (1.524 m)   Wt 92 lb 2.4 oz (41.8 kg)   SpO2 98%   BMI 18.00 kg/m²     General appearance: lethargic but arouses. Mildly uncomfortable  HEENT: Pupils equal, round, and reactive to light. Conjunctivae/corneas clear. Neck: Supple, with full range of motion. No jugular venous distention. Trachea midline. Respiratory:  Normal respiratory effort. Clear to auscultation, bilaterally without Rales/Wheezes/Rhonchi.   Cardiovascular: Regular rate and rhythm with normal S1/S2 without murmurs, rubs or gallops. Abdomen: Soft, non-tender, non-distended with normal bowel sounds. Musculoskeletal: left arm in ace bandage, left hand swelling  Skin: bruises on bilateral legs  Neurologic:  Neurovascularly intact without any focal sensory/motor deficits. Cranial nerves: II-XII intact, grossly non-focal.  Psychiatric: Alert and oriented, thought content appropriate, normal insight  Capillary Refill: Brisk,3 seconds, normal   Peripheral Pulses: +2 palpable, equal bilaterally       Labs:   Recent Labs     02/03/22  0609   HGB 13.8   HCT 41.2     No results for input(s): NA, K, CL, CO2, BUN, CREATININE, CALCIUM, PHOS in the last 72 hours. Invalid input(s): MAGNES  No results for input(s): AST, ALT, BILIDIR, BILITOT, ALKPHOS in the last 72 hours. Recent Labs     02/01/22  1746 02/03/22  0609 02/04/22  0606   INR 1.86* 1.45* 1.45*     No results for input(s): CKTOTAL, TROPONINI in the last 72 hours. Urinalysis:      Lab Results   Component Value Date    NITRU Negative 10/07/2020    WBCUA 8 10/07/2020    BACTERIA 4+ 05/29/2017    RBCUA 1 10/07/2020    BLOODU Negative 10/07/2020    SPECGRAV 1.012 10/07/2020    GLUCOSEU Negative 10/07/2020       Radiology:  XR WRIST LEFT (2 VIEWS)   Final Result      FLUORO FOR SURGICAL PROCEDURES   Final Result      CT HEAD WO CONTRAST   Final Result   Cerebral atrophy without acute intracranial abnormality. Assessment/Plan:    Active Hospital Problems    Diagnosis     Closed fracture of left distal radius and ulna, initial encounter [S52.502A, S52.602A]     Closed fracture of left distal radius [S52.502A]     Warfarin-induced coagulopathy (HCC) [Y79.44, T45.515A]     Fracture, Colles, left, closed [S52.532A]     HTN (hypertension) [I10]        L distal radial fx-s/p ORIF POD#2  Pain tolerable  PT/OT to evaluate  Continue present management  Dc planning    HTN urgency  Resolved.  Blood pressure better controlled  Add prn hydralazine  Resume home meds  Check bladder to make sure shes not retaining urine    Warfarin induced coagulopathy-  Stable. Got vitamin k to allow for surgery  Restarted back on coumadin    FEN-  Appetite good. No issues  Stop maintenance ivf    DVT Prophylaxis: no need, elevated INR  Diet: ADULT DIET; Dysphagia - Pureed  Code Status: Full Code    PT/OT Eval Status:ordered and need to evaluate and plan for DC     Dispo - dc soon.  Waiting for PT/OT eval Will d/w ortho    Case d/w JUNO Cruz MD

## 2022-02-04 NOTE — PROGRESS NOTES
Patient in bed, awake. PT/OT worked with patient this afternoon and stated she tolerated sitting on side of bed, however, she is a maxi move for transfers. Patient evening uneventful. Will continue to round on patient per unit protocol. Call light and bedside table within reach. Will continue to monitor and reassess.   Electronically signed by Juan Luis Izaguirre RN on 2/4/2022 at 5:27 PM

## 2022-02-04 NOTE — PROGRESS NOTES
sling and wrist soft cast (digit significant swelling/pain) and RUE digit 5-3 contractured in flexion. However, able to complete face washing with SBA. Anticipate Total A LB and Max A UB ADL based on balance, endurance, cognition, pain and PLOF. Cont acute OT to address above deficits and rec OT 3-5x/week to improve IND with ADL/fxl mobility  Treatment Diagnosis: impaired ADL/fxl mobility  Prognosis: Fair;Poor  Decision Making: Medium Complexity  OT Education: OT Role;Transfer Training;Plan of Care;Precautions  REQUIRES OT FOLLOW UP: Yes  Activity Tolerance  Activity Tolerance: Patient limited by pain;Treatment limited secondary to decreased cognition;Patient limited by fatigue  Safety Devices  Safety Devices in place: Yes  Type of devices: Nurse notified;Gait belt;Call light within reach; Left in bed;Bed alarm in place; Patient at risk for falls         Patient Diagnosis(es): The encounter diagnosis was Other closed intra-articular fracture of distal end of left radius, initial encounter. has a past medical history of Anemia, CAD (coronary artery disease), Glaucoma, High blood pressure, and Myocardial infarction ([facility] [address] has a past surgical history that includes Pacemaker insertion; Appendectomy; Hip Arthroplasty (Right, 05/25/2017); and Forearm surgery (Left, 2/2/2022). Treatment Diagnosis: impaired ADL/fxl mobility      Restrictions  Restrictions/Precautions  Restrictions/Precautions: Weight Bearing,Fall Risk  Required Braces or Orthoses?: Yes  Upper Extremity Weight Bearing Restrictions  Left Upper Extremity Weight Bearing: Non Weight Bearing  Required Braces or Orthoses  Left Upper Extremity Brace/Splint: Sling    Subjective   General  Chart Reviewed: Yes  Patient assessed for rehabilitation services?: Yes  Additional Pertinent Hx: 81 yo female admitted for L distal radius ORIF 2/2. fall was ~6 weeks ago with L proximal humerus fx as well.  PMH: CAD, Glaucoma, MI, 2017 RIGHT BIPOLAR HIP HEMIARTHROPLASTY  Family / Caregiver Present: No  Referring Practitioner: Valorie Resendiz MD  Diagnosis: L radius ORIF  Subjective  Subjective: Pt resting in bed upon arrival alert. Pt agreeable to OT/PT eval and OOB activity. Pt with no pain at rest, moans at times with activity. General Comment  Comments: RN ok to see       Social/Functional History  Social/Functional History  Lives With: Daughter  Type of Home: House  Home Layout: One level  Home Access: Stairs to enter with rails  Entrance Stairs - Number of Steps: 3  Entrance Stairs - Rails: Both  Bathroom Shower/Tub: Tub/Shower unit  Bathroom Toilet: Standard  Bathroom Equipment: Shower chair  Bathroom Accessibility: Walker accessible  Home Equipment: 4 wheeled walker  ADL Assistance: Needs assistance (assist bathing)  Homemaking Assistance: Needs assistance (dtr does all)  Ambulation Assistance: Independent (7WZ)  Transfer Assistance: Needs assistance (shower tx assist)  Leisure & Hobbies: watch birds  Additional Comments: Has been staying a SNF ([address] [place] [address] for rehab) since fall on 12/9 and admission to [facility] for a L proximal humerus fracture       Objective   Vision: Impaired  Vision Exceptions:  (Glaucoma)  Hearing: Exceptions to [facility]  Hearing Exceptions: Hard of hearing/hearing concerns    Orientation  Overall Orientation Status: Impaired  Orientation Level: Oriented to person;Disoriented to situation;Disoriented to time;Disoriented to place  Observation/Palpation  Observation: LUE sling and forearm in soft cast. LUE digits swollen. Middle digit contracture in flexion.  RUE digits 5-3 in flexion contracture  Balance  Sitting Balance: Maximum assistance (Max to Min to Mod sitting EOB x8-10 min)  Functional Mobility  Functional Mobility Comments: TRICIA d/t poor sitting balance and inability to complete tx  ADL  Grooming: Stand by assistance;Setup (supine, HOB elevated)  LE Dressing: Dependent/Total (donning socks)  Additional Comments: Anticipate Total A LB and Max A UB ADL based on balance, endurance, cognition, pain and PLOF  Tone RUE  RUE Tone: Normotonic  Tone LUE  LUE Tone: Not tested  Coordination  Movements Are Fluid And Coordinated: No  Coordination and Movement description: Decreased speed;Decreased accuracy     Bed mobility  Supine to Sit: Moderate assistance;2 Person assistance  Sit to Supine: 2 Person assistance;Maximum assistance  Scooting: Maximal assistance  Transfers  Transfer Comments: attempted within stedy x2 from elevated EOB. difficulty, pt with posterior lean and LUE pain     Cognition  Overall Cognitive Status: Exceptions  Following Commands: Follows one step commands with repetition; Follows one step commands with increased time  Attention Span: Difficulty attending to directions  Memory: Decreased recall of biographical Information;Decreased short term memory;Decreased recall of recent events;Decreased recall of precautions;Decreased long term memory  Safety Judgement: Decreased awareness of need for safety;Decreased awareness of need for assistance  Problem Solving: Decreased awareness of errors  Insights: Not aware of deficits  Initiation: Requires cues for all  Sequencing: Requires cues for all        Sensation  Overall Sensation Status:  (TRICIA)        LUE AROM (degrees)  LUE General AROM: TRICIA within cast and sling. middle digit contracture in flexion, other digits significant swelling  RUE AROM (degrees)  RUE AROM : WFL  LUE Strength  LUE Strength Comment: TRICIA  RUE Strength  RUE Strength Comment: moderate generalized weakness                Plan   Plan  Times per week: 3-5  Current Treatment Recommendations: Strengthening,Endurance Training,Patient/Caregiver Education & Training,ROM,Balance Training,Pain Management,Functional Mobility Training,Safety Education & Training,Positioning    AM-PAC Score        AM-[facility] Inpatient Daily Activity Raw Score: 11 (02/04/22 1551)  AM-PAC Inpatient ADL T-Scale Score : 29.04 (02/04/22 1551)  ADL Inpatient CMS 0-100% Score: 70.42 (02/04/22 1551)  ADL Inpatient CMS G-Code Modifier : CL (02/04/22 1551)    Goals  Short term goals  Time Frame for Short term goals: prior to d/c  Short term goal 1: bed mobility Mod A x1  Short term goal 2: tolerate 5 min static sitting with Min A  Short term goal 3: sit><stand with mod A x2 in prep for ADl tx  Short term goal 4: LUE elbow and digit AROM to prevent stiffness and decreased swelling for ADLs  Short term goal 5: grooming seated set up  Patient Goals   Patient goals : improve pain       Therapy Time   Individual Concurrent Group Co-treatment   Time In 1505         Time Out 1545         Minutes 40         Timed Code Treatment Minutes: 25 Minutes (15 eval. 25 TA)       MEREDITH Phelps, OTR/L

## 2022-02-04 NOTE — PROGRESS NOTES
Physical Therapy    Facility/Department: [address] ORTHOPEDICS  Initial Assessment    NAME: Linnea Parra  : 3/2/1928  MRN: [id number]    Date of Service: 2022    Assessment / Discharge Recommendations:    -will need continued PT OT and nursing care in a skilled setting as prior to admission  -she had ORIF left wrist but functionally limited as was prior to admission  -anticipate return to [facility] with PT OT as facility determines     Linnea Parra scored a  on the AM-PAC short mobility form. Current research shows that an AM-PAC score of 17 or less is typically not associated with a discharge to the patient's home setting. Body structures, Functions, Activity limitations: Decreased functional mobility ; Decreased balance;Decreased ADL status; Decreased cognition;Decreased endurance; Increased pain  Prognosis: Fair  Decision Making: Medium Complexity  REQUIRES PT FOLLOW UP: Yes  Activity Tolerance  Activity Tolerance: Patient limited by endurance; Patient limited by cognitive status; Patient limited by pain       Patient Diagnosis(es): The encounter diagnosis was Other closed intra-articular fracture of distal end of left radius, initial encounter. has a past medical history of Anemia, CAD (coronary artery disease), Glaucoma, High blood pressure, and Myocardial infarction ([facility] [address] has a past surgical history that includes Pacemaker insertion; Appendectomy; Hip Arthroplasty (Right, 2017); and Forearm surgery (Left, 2022).     Restrictions  Restrictions/Precautions  Restrictions/Precautions: Weight Bearing,Fall Risk  Required Braces or Orthoses?: Yes  Upper Extremity Weight Bearing Restrictions  Left Upper Extremity Weight Bearing: Non Weight Bearing  Required Braces or Orthoses  Left Upper Extremity Brace/Splint: Sling  Vision/Hearing  Vision: Impaired  Hearing: Exceptions to [facility]  Hearing Exceptions: Hard of hearing/hearing concerns     Subjective  General  Chart Reviewed: Yes  Patient assessed for rehabilitation services?: Yes  Additional Pertinent Hx: here due to need to perform ORIF left wrist from fracture about 6 weeks ago  - also with left proximal humeral fracture - non-surgical  Response To Previous Treatment: Not applicable  Family / Caregiver Present: No  Follows Commands:  (partially)  Subjective  Subjective: arrived to room (in afternoon) to patient awake - pleasant and agreeable to PTOT and attempt to sit up to EOB - (hopefully out of bed to the recliner)  Orientation  Orientation  Overall Orientation Status: Impaired  Social/Functional History  Social/Functional History  Lives With: Daughter  Type of Home: House  Home Layout: One level  Home Access: Stairs to enter with rails  Entrance Stairs - Number of Steps: 3  Entrance Stairs - Rails: Both  Bathroom Shower/Tub: Tub/Shower unit  Bathroom Toilet: Standard  Bathroom Equipment: Shower chair  Bathroom Accessibility: Walker accessible  Home Equipment: 4 wheeled walker  ADL Assistance: Needs assistance (assist bathing)  [address]: Needs assistance (dtr does all)  Ambulation Assistance: Independent (7GA)  Transfer Assistance: Needs assistance (shower tx assist)  Leisure & Hobbies: watch birds  Additional Comments: Has been staying a SNF ([address] [place] [address] for rehab) since fall on 12/9 and admission to [facility] for a L proximal humerus fracture  Objective  Bed mobility  Supine to Sit: Moderate assistance;2 Person assistance  Sit to Supine: 2 Person assistance;Maximum assistance  Scooting: Maximal assistance  Transfers  Sit to Stand:  (not successful to get OOB via pierre stedy)  Ambulation  Ambulation?: No  Stairs/Curb  Stairs?: No  Balance  Comments: able to reach midline in sitting at the EOB with assist going from Max <>mod assist - tending to push back  Plan   Plan  Times per week: 3-5  Current Treatment Recommendations: Transfer Training,Patient/Caregiver Education & Training,ADL/Self-care Training,Positioning,Modalities  Safety Devices  Type of devices:  All fall risk precautions in place,Bed alarm in place,Left in bed,Nurse notified,Call light within reach Tiara Pabon)    Goals  Short term goals  Time Frame for Short term goals: 1-2 days  Short term goal 1: bed mobility at mod assist of 2  Short term goal 2: transfers at min/mod assist of 2 in pierre stedy or for stand pivot bed <> recliner with mod assist  (PLOF is dedicated walker user)  Patient Goals   Patient goals : not formulated at this session       Therapy Time   Individual Concurrent Group Co-treatment   Time In 1505         Time Out 1545         Minutes 111 Eulalio Macias, PT

## 2022-02-04 NOTE — PROGRESS NOTES
injection 10 mg, 10 mg, IntraVENous, Q4H PRN  latanoprost (XALATAN) 0.005 % ophthalmic solution 1 drop, 1 drop, Both Eyes, Nightly  levothyroxine (SYNTHROID) tablet 50 mcg, 50 mcg, Oral, Daily  metoprolol succinate (TOPROL XL) extended release tablet 25 mg, 25 mg, Oral, Daily  sodium chloride flush 0.9 % injection 5-40 mL, 5-40 mL, IntraVENous, 2 times per day  sodium chloride flush 0.9 % injection 5-40 mL, 5-40 mL, IntraVENous, PRN  ondansetron (ZOFRAN-ODT) disintegrating tablet 4 mg, 4 mg, Oral, Q8H PRN **OR** ondansetron (ZOFRAN) injection 4 mg, 4 mg, IntraVENous, Q6H PRN  polyethylene glycol (GLYCOLAX) packet 17 g, 17 g, Oral, Daily PRN  acetaminophen (TYLENOL) tablet 650 mg, 650 mg, Oral, Q6H PRN **OR** acetaminophen (TYLENOL) suppository 650 mg, 650 mg, Rectal, Q6H PRN    ASSESSMENT AND PLAN    Fall  Left distal radius fx, Plan ORIF tomorrow per Dr Jerry Henry  Skin tears left wrist reported from surgical staff, will need to eval with splint change in office. Left humeral head fx, sling on all times unless bathing, NWB  NWB left arm  PT OT to see  SS for Carline Clarity Dr Keeley Doty in office in 1-2 weeks.        Brent Ferreira, APRN - CNP  2/4/2022  10:54 AM

## 2022-02-04 NOTE — CONSULTS
Clinical Pharmacy Note  Warfarin Consult    Jackie Nation is a 80 y.o. female receiving warfarin managed by pharmacy. Warfarin Indication: Afib  Target INR range: 2-3   Dose prior to admission: 2.5 mg daily    Current warfarin drug-drug interactions:    Recent Labs     02/01/22  1746 02/03/22  0609 02/04/22  0606   HGB  --  13.8  --    HCT  --  41.2  --    INR 1.86* 1.45* 1.45*       Assessment/Plan:    Patient received vitamin K 5 mg PO times two doses prior to surgery. Warfarin 3 mg tonight for INR of 1.45. Daily PT/INR until stable within therapeutic range. Thank you for the consult. Will continue to follow.     Sheldon Nava, [facility], [address] 2/4/2022 8:37 AM

## 2022-02-05 VITALS
RESPIRATION RATE: 15 BRPM | DIASTOLIC BLOOD PRESSURE: 74 MMHG | TEMPERATURE: 97.9 F | OXYGEN SATURATION: 95 % | HEART RATE: 57 BPM | SYSTOLIC BLOOD PRESSURE: 127 MMHG | HEIGHT: 60 IN | WEIGHT: 94.8 LBS | BODY MASS INDEX: 18.61 KG/M2

## 2022-02-05 LAB
INR BLD: 1.54 (ref 0.88–1.12)
PROTHROMBIN TIME: 17.7 SEC (ref 9.9–12.7)
SARS-COV-2, NAAT: NOT DETECTED

## 2022-02-05 PROCEDURE — 2580000003 HC RX 258: Performed by: ORTHOPAEDIC SURGERY

## 2022-02-05 PROCEDURE — 36415 COLL VENOUS BLD VENIPUNCTURE: CPT

## 2022-02-05 PROCEDURE — 6370000000 HC RX 637 (ALT 250 FOR IP): Performed by: ORTHOPAEDIC SURGERY

## 2022-02-05 PROCEDURE — 85610 PROTHROMBIN TIME: CPT

## 2022-02-05 PROCEDURE — 94761 N-INVAS EAR/PLS OXIMETRY MLT: CPT

## 2022-02-05 PROCEDURE — 87635 SARS-COV-2 COVID-19 AMP PRB: CPT

## 2022-02-05 PROCEDURE — G0378 HOSPITAL OBSERVATION PER HR: HCPCS

## 2022-02-05 PROCEDURE — 6360000002 HC RX W HCPCS: Performed by: ORTHOPAEDIC SURGERY

## 2022-02-05 PROCEDURE — 96376 TX/PRO/DX INJ SAME DRUG ADON: CPT

## 2022-02-05 RX ORDER — WARFARIN SODIUM 5 MG/1
5 TABLET ORAL
Status: DISCONTINUED | OUTPATIENT
Start: 2022-02-05 | End: 2022-02-05 | Stop reason: HOSPADM

## 2022-02-05 RX ADMIN — LISINOPRIL 10 MG: 10 TABLET ORAL at 09:52

## 2022-02-05 RX ADMIN — TRAMADOL HYDROCHLORIDE 50 MG: 50 TABLET ORAL at 06:15

## 2022-02-05 RX ADMIN — SODIUM CHLORIDE, PRESERVATIVE FREE 10 ML: 5 INJECTION INTRAVENOUS at 09:52

## 2022-02-05 RX ADMIN — TRAMADOL HYDROCHLORIDE 50 MG: 50 TABLET ORAL at 13:34

## 2022-02-05 RX ADMIN — LEVOTHYROXINE SODIUM 50 MCG: 50 TABLET ORAL at 06:15

## 2022-02-05 RX ADMIN — METOPROLOL SUCCINATE 25 MG: 25 TABLET, EXTENDED RELEASE ORAL at 09:52

## 2022-02-05 RX ADMIN — HYDRALAZINE HYDROCHLORIDE 10 MG: 20 INJECTION INTRAMUSCULAR; INTRAVENOUS at 04:36

## 2022-02-05 ASSESSMENT — PAIN SCALES - PAIN ASSESSMENT IN ADVANCED DEMENTIA (PAINAD)
NEGVOCALIZATION: 0
BODYLANGUAGE: 0
NEGVOCALIZATION: 0
BREATHING: 1
TOTALSCORE: 0
CONSOLABILITY: 0
BODYLANGUAGE: 1
TOTALSCORE: 0
TOTALSCORE: 0
NEGVOCALIZATION: 0
CONSOLABILITY: 0
BODYLANGUAGE: 0
BODYLANGUAGE: 0
BREATHING: 0
BODYLANGUAGE: 0
NEGVOCALIZATION: 0
FACIALEXPRESSION: 0
CONSOLABILITY: 0
CONSOLABILITY: 1
NEGVOCALIZATION: 1
BREATHING: 0
BREATHING: 0
FACIALEXPRESSION: 0
TOTALSCORE: 5
FACIALEXPRESSION: 0
CONSOLABILITY: 0
FACIALEXPRESSION: 0
BREATHING: 0
TOTALSCORE: 0
FACIALEXPRESSION: 1

## 2022-02-05 ASSESSMENT — PAIN SCALES - GENERAL
PAINLEVEL_OUTOF10: 0
PAINLEVEL_OUTOF10: 6
PAINLEVEL_OUTOF10: 0
PAINLEVEL_OUTOF10: 10

## 2022-02-05 NOTE — PROGRESS NOTES
Progress Note    Admit Date: 1/31/2022         Subjective and Overnight Events: Pt being followed up for L distal radial Fx   No new issues  No pain  No fever  No sob       Objective:   Vitals: /74   Pulse 57   Temp 97.9 °F (36.6 °C) (Oral)   Resp 15   Ht 5' (1.524 m)   Wt 94 lb 12.8 oz (43 kg)   SpO2 95%   BMI 18.51 kg/m²   /74   Pulse 57   Temp 97.9 °F (36.6 °C) (Oral)   Resp 15   Ht 5' (1.524 m)   Wt 94 lb 12.8 oz (43 kg)   SpO2 95%   BMI 18.51 kg/m²     General Appearance:    Alert, cooperative, no distress, appears stated age   Head:    Normocephalic, without obvious abnormality, atraumatic   Eyes:    PERRL, conjunctiva/corneas clear       Ears:    Normal TM's and external ear canals, both ears   Nose:   Nares normal, septum midline, mucosa normal   Throat:   Lips, mucosa, and tongue normal; teeth and gums normal           Lungs:     Clear to auscultation bilaterally, respirations unlabored       Heart:    Regular rate and rhythm, S1 and S2 normal, no murmur, rub    or gallop   Abdomen:     Soft, non-tender, bowel sounds active all four quadrants,     no masses, no organomegaly           Extremities: L arm wrapped -sling    Pulses:    Skin:        Neurologic:   CNII-XII intact.  Normal strength, sensation and reflexes       throughout     Data:     Scheduled Medications:    warfarin  5 mg Oral Once    sodium chloride flush  5-40 mL IntraVENous 2 times per day    warfarin placeholder: dosing by pharmacy   Other RX Placeholder    lisinopril  10 mg Oral Daily    latanoprost  1 drop Both Eyes Nightly    levothyroxine  50 mcg Oral Daily    metoprolol succinate  25 mg Oral Daily    sodium chloride flush  5-40 mL IntraVENous 2 times per day      PRN Medications: sodium chloride flush, sodium chloride, traMADol, hydrALAZINE, sodium chloride flush, ondansetron **OR** ondansetron, polyethylene glycol, acetaminophen **OR** acetaminophen  Diet: ADULT DIET; Dysphagia - Pureed    Continuous Infusions:   sodium chloride           Intake/Output Summary (Last 24 hours) at 2/5/2022 1146  Last data filed at 2/5/2022 1005  Gross per 24 hour   Intake 310 ml   Output --   Net 310 ml       CBC:   Recent Labs     02/03/22  0609   HGB 13.8     BMP:No results for input(s): NA, K, CL, CO2, BUN, CREATININE, GLUCOSE in the last 72 hours. ABGs:   Lab Results   Component Value Date    PHART 7.462 01/31/2022    PO2ART 77.9 01/31/2022    OIW9YKM 37.5 01/31/2022       Assessment/plan     Patient Active Problem List:     HTN (hypertension)     Hyperkalemia     Knee pain     Atrial fibrillation with RVR ([address]     5/25/17 RIGHT hip hemiarthroplasty     Dementia (HCC)     GERD (gastroesophageal reflux disease)     Chronic atrial fibrillation (HCC)     Cardiomyopathy, ischemic     Chronic anticoagulation     Generalized weakness     Hypertensive urgency     Closed fracture of proximal end of left humerus     Fracture, Colles, left, closed     Warfarin-induced coagulopathy (HCC)     Elevated INR     Closed fracture of left distal radius     Closed fracture of left distal radius and ulna, initial encounter      L distal radial fx-s/p ORIF POD#3  Pain tolerable  PT/OT to evaluate  Continue present management  Dc planning     HTN urgency  Resolved. Blood pressure better controlled  Add prn hydralazine  Resume home meds  Check bladder to make sure shes not retaining urine     Warfarin induced coagulopathy-  Stable. Got vitamin k to allow for surgery  Restarted back on coumadin     FEN-  Appetite good.  No issues  Stop maintenance ivf        Full Code    Lonnie Kaplan MD

## 2022-02-05 NOTE — PROGRESS NOTES
Attempted to call report to [facility]. No one available to take report at this time. Will try again later.

## 2022-02-05 NOTE — PROGRESS NOTES
Patient in bed resting. She is alert and oriented to self, on RA, VSS but BP elevated. Evening assessment and medications given without complications. Bed in lowest locked position, bed alarm turned on, call light within reach. Will continue to monitor throughout the shift.

## 2022-02-05 NOTE — PLAN OF CARE
Problem: Falls - Risk of:  Goal: Will remain free from falls  Description: Will remain free from falls  2/5/2022 1031 by Soco Chauhan RN  Outcome: Ongoing  Note: Pt free from injury or falls at this time, fall precautions in place, bed in low position, side rail up x2, Contreras Fall Risk: High (45 and higher), bed alarm on, reoriented to room and call light, reminded not to get up without assistance, call light in reach, will continue to monitor. Pt unable to verbalize understanding of fall risk procedures. 2/4/2022 2256 by Kristin Hudson RN  Outcome: Not Met This Shift  Goal: Absence of physical injury  Description: Absence of physical injury  2/5/2022 1031 by Soco Chauhan RN  Outcome: Ongoing  Note: Pt has not incurred any type of physical injury this shift. 2/4/2022 2256 by Kristin Hudson RN  Outcome: Not Met This Shift     Problem: Pain:  Goal: Pain level will decrease  Description: Pain level will decrease  2/5/2022 1031 by Soco Chauhan RN  Outcome: Ongoing  Note: Pt has denied having any current pain and has not exhibited any pain symptoms. Will monitor and medicate as needed. 2/4/2022 2256 by Kristin Hudson RN  Outcome: Not Met This Shift  Goal: Control of acute pain  Description: Control of acute pain  2/5/2022 1031 by Soco Chauhan RN  Outcome: Ongoing  Note: Pt has denied having any current pain and has not exhibited any pain symptoms. Will monitor and medicate as needed. 2/4/2022 2256 by Kristin Hudson RN  Outcome: Not Met This Shift  Goal: Control of chronic pain  Description: Control of chronic pain  2/5/2022 1031 by Soco Chauhan RN  Outcome: Ongoing  Note: Pt has not c/o any chronic pain issues.    2/4/2022 2256 by Kristin Hudson RN  Outcome: Not Met This Shift     Problem: Skin Integrity:  Goal: Will show no infection signs and symptoms  Description: Will show no infection signs and symptoms  2/5/2022 1031 by Soco Chauhan RN  Outcome: Ongoing  Note: Pt has been afebrile this shift with no s/s of infection noted. Will monitor for changes. 2/4/2022 2256 by Wil Tovar RN  Outcome: Not Met This Shift  Goal: Absence of new skin breakdown  Description: Absence of new skin breakdown  2/5/2022 1031 by Mayte Ahn RN  Outcome: Ongoing  Note: No new areas of skin breakdown noted. Will monitor for changes.   2/4/2022 2256 by Wil Tovar RN  Outcome: Not Met This Shift

## 2022-02-05 NOTE — PROGRESS NOTES
Attempted to call report to [facility], but again, no one available to take report. Notified  to have RN return my call if she has any questions.

## 2022-02-05 NOTE — DISCHARGE SUMMARY
Hospital Discharge Summary    Patient's PCP: Eliseo Hunter MD  Admit Date: 1/31/2022   Discharge Date: 2/5/2022    Admitting Physician: Dr. Rafat Cook admitting provider for patient encounter. Discharge Physician: Dr. Jullie Nageotte, MD MD    Consults:     IP CONSULT TO SOCIAL WORK  IP CONSULT TO PHARMACY    Discharge Diagnoses:   Patient Active Problem List   Diagnosis    HTN (hypertension)    Hyperkalemia    Knee pain    Atrial fibrillation with RVR ([address]    5/25/17 RIGHT hip hemiarthroplasty    Dementia ([address]    GERD (gastroesophageal reflux disease)    Chronic atrial fibrillation (HCC)    Cardiomyopathy, ischemic    Chronic anticoagulation    Generalized weakness    Hypertensive urgency    Closed fracture of proximal end of left humerus    Fracture, Colles, left, closed    Warfarin-induced coagulopathy ([address]    Elevated INR    Closed fracture of left distal radius    Closed fracture of left distal radius and ulna, initial encounter     L distal radial fx-s/p ORIF POD#3  Pain tolerable  PT/OT to evaluate  Continue present management  Dc planning     HTN urgency  Resolved. Blood pressure better controlled  Add prn hydralazine  Resume home meds  Check bladder to make sure shes not retaining urine     Warfarin induced coagulopathy-  Stable. Got vitamin k to allow for surgery  Restarted back on coumadin     FEN-  Appetite good. No issues  Stop maintenance ivf    Hospital Course:  80year-old female past medical history significant for anemia, CAD and hypertension presented for elective cath of left radial fracture. Patient noted to have elevated INR before surgery 4.2.     According to medical records she is from a nursing home. She takes warfarin for unclear reasons.   Patient did not provide a much history, she appears to be pleasantly demented     Patient was seen by ortho - underwent surgery  No complications      PREOPERATIVE DIAGNOSIS:  Left distal radius three-part intra-articular  comminuted displaced fracture, over six weeks' old.     POSTOPERATIVE DIAGNOSIS:  Left distal radius three-part intra-articular  comminuted displaced fracture, over six weeks' old.     OPERATION PERFORMED:  Open treatment of left distal radius three-part  intra-articular fracture with open reduction and internal fixation. Seen by PTOT - continue therapy      Discharge planning and medical issues as well as  Alarm and precautions were discussed at length with the pt and/or relatives/POA    Significant Diagnostic Studies:  As above      Treatments: As above      Disposition: SNF    Discharged Condition: Stable    Follow Up: Primary Care Physician in two weeks    Discharge Medications:     Medication List      START taking these medications    clindamycin 300 MG capsule  Commonly known as: CLEOCIN  Take 1 capsule by mouth 3 times daily for 5 days        CONTINUE taking these medications    acetaminophen 500 MG tablet  Commonly known as: TYLENOL     ascorbic acid 1000 MG tablet  Commonly known as: VITAMIN C     latanoprost 0.005 % ophthalmic solution  Commonly known as: XALATAN     levothyroxine 50 MCG tablet  Commonly known as: SYNTHROID     lisinopril 10 MG tablet  Commonly known as: PRINIVIL;ZESTRIL     menthol-zinc oxide 0.44-20.625 % Oint ointment  Commonly known as: CALMOSEPTINE     metoprolol succinate 25 MG extended release tablet  Commonly known as: TOPROL XL     nitroGLYCERIN 0.4 MG SL tablet  Commonly known as: NITROSTAT     * oxyCODONE 5 MG immediate release tablet  Commonly known as: ROXICODONE     * oxyCODONE 5 MG immediate release tablet  Commonly known as: ROXICODONE     Phytoplex Z-Guard 57-17 % Pste  Generic drug: Petrolatum-Zinc Oxide     Vitamin D3 50 MCG (2000 UT) Caps     warfarin 2.5 MG tablet  Commonly known as: COUMADIN     zinc gluconate 50 MG tablet         * This list has 2 medication(s) that are the same as other medications prescribed for you.  Read the directions carefully, and ask your doctor or other care provider to review them with you. ASK your doctor about these medications    HYDROcodone-acetaminophen 5-325 MG per tablet  Commonly known as: Norco  Take 1 tablet by mouth every 6 hours as needed for Pain for up to 3 days. Intended supply: 3 days. Take lowest dose possible to manage pain  Ask about: Should I take this medication? Where to Get Your Medications      You can get these medications from any pharmacy    Bring a paper prescription for each of these medications  · clindamycin 300 MG capsule  · HYDROcodone-acetaminophen 5-325 MG per tablet          Activity: activity as tolerated     Diet: regular diet          Total time spent on discharge, finalizing medications, referrals and arranging outpatient follow up was more than 30 minutes    Thank you Dr. Suzanna Haq MD for the opportunity to be involved in this patients care. If you have any questions or concerns please feel free to contact me at [phone].     Santa Obrien MD MD

## 2022-02-05 NOTE — PROGRESS NOTES
Pt resting in bed at beginning of shift. She is oriented to self only. Pt denies having any current pain. LUE edematous. Ace wrap and sling in place, skin warm to touch. Pt assisted with breakfast and repositioned. She is v-paced on telemetry. Fall precautions in place, belongings within reach. Will continue to monitor and assess.

## 2022-02-05 NOTE — CARE COORDINATION
DISCHARGE SUMMARY      DATE OF DISCHARGE: 2/5/2022     DISCHARGE DESTINATION:      FACILITY: Discharging to Facility/ Agency   · Name: [facility]   · Address: [address], [place] [address]  · Phone: [phone]  · Fax: [phone]       Report Number: [phone]     Fax Number:  [phone]     Precert Obtained: N/A     Hens Completed: N/A     PASARR: N/A     Notified: RN, Facility, daughter Royce Tejada: [address] Name: Anahi Herr up Time: 2:30 PM     Phone Number: [phone]     NEW DME ORDERED: no      Discharging nurse to complete KAREN, reconcile AVS, and place final copy with patient's discharge packet. Discharging RN to ensure that written prescriptions for  Level II medications are sent with patient to the facility as per protocol.     Electronically signed by Esaw Kussmaul, RN MSN on 2/5/2022 at 12:12 PM

## 2022-02-05 NOTE — CONSULTS
Clinical Pharmacy Note  Warfarin Consult    Kev Akers is a 80 y.o. female receiving warfarin managed by pharmacy. Warfarin Indication: Afib  Target INR range: 2-3   Dose prior to admission: 2.5 mg daily    Current warfarin drug-drug interactions:    Recent Labs     02/03/22  0609 02/04/22  0606 02/05/22  0447   HGB 13.8  --   --    HCT 41.2  --   --    INR 1.45* 1.45* 1.54*       Assessment/Plan:    Patient received vitamin K 5 mg PO times two doses prior to surgery. Warfarin 5 mg tonight for INR of 1.54. Daily PT/INR until stable within therapeutic range. Thank you for the consult. Will continue to follow.     Navneet Hyatt, [address] [place] [address], [address] [place] [address] 2/5/2022 7:33 AM

## 2022-02-05 NOTE — PLAN OF CARE
Problem: Falls - Risk of:  Goal: Will remain free from falls  Description: Will remain free from falls  Outcome: Not Met This Shift  Goal: Absence of physical injury  Description: Absence of physical injury  Outcome: Not Met This Shift     Problem: Pain:  Goal: Pain level will decrease  Description: Pain level will decrease  Outcome: Not Met This Shift  Goal: Control of acute pain  Description: Control of acute pain  Outcome: Not Met This Shift  Goal: Control of chronic pain  Description: Control of chronic pain  Outcome: Not Met This Shift     Problem: Skin Integrity:  Goal: Will show no infection signs and symptoms  Description: Will show no infection signs and symptoms  Outcome: Not Met This Shift  Goal: Absence of new skin breakdown  Description: Absence of new skin breakdown  Outcome: Not Met This Shift

## 2022-02-05 NOTE — PROGRESS NOTES
Discharge:    IV and telemetry discontinued. Discharge information, medications, and follow-up instructions copied and sent to [facility]. Attempted to call report twice to [facility], but unable to speak with an RN. Pt left unit and is being transported to [facility] via ambulance.

## 2022-02-15 ENCOUNTER — OFFICE VISIT (OUTPATIENT)
Dept: ORTHOPEDIC SURGERY | Age: 87
End: 2022-02-15
Payer: MEDICARE

## 2022-02-15 VITALS — WEIGHT: 94 LBS | HEIGHT: 60 IN | BODY MASS INDEX: 18.46 KG/M2

## 2022-02-15 DIAGNOSIS — S52.572A OTHER CLOSED INTRA-ARTICULAR FRACTURE OF DISTAL END OF LEFT RADIUS, INITIAL ENCOUNTER: ICD-10-CM

## 2022-02-15 DIAGNOSIS — S42.292A OTHER CLOSED DISPLACED FRACTURE OF PROXIMAL END OF LEFT HUMERUS, INITIAL ENCOUNTER: Primary | ICD-10-CM

## 2022-02-15 PROCEDURE — APPNB15 APP NON BILLABLE TIME 0-15 MINS: Performed by: NURSE PRACTITIONER

## 2022-02-15 PROCEDURE — L3908 WHO COCK-UP NONMOLDE PRE OTS: HCPCS | Performed by: NURSE PRACTITIONER

## 2022-02-15 PROCEDURE — 99024 POSTOP FOLLOW-UP VISIT: CPT | Performed by: NURSE PRACTITIONER

## 2022-02-16 ENCOUNTER — TELEPHONE (OUTPATIENT)
Dept: ORTHOPEDIC SURGERY | Age: 87
End: 2022-02-16

## 2022-02-16 NOTE — TELEPHONE ENCOUNTER
[facility] and need notes for therapy ROM and W/B status.   Call [phone], pls fax [phone] attn therapy

## 2022-02-16 NOTE — PROGRESS NOTES
DIAGNOSIS:  Left distal radius 3 parts intra articular comminuted fracture, status post ORIF. DATE OF SURGERY: 2/2/2022. HISTORY OF PRESENT ILLNESS:  Ms. Michelle Hurtado 80 y.o.  female right handed returns today  for 2 weeks postoperative visit. The patient denies any significant pain in the left wrist.  She is unable to rate her pain. She is currently in a rehab center working with physical therapy. No numbness or tingling sensation. No fever or Chills. She  is in a splint. She has seen Dr. Yogi Reese for her left shoulder fracture who recommended no surgery. PHYSICAL EXAMINATION:  The incision is completely healed . No signs of any erythema or drainage. She  has no pain with the active or passive range of motion of the left wrist, but decrease ROM. She  has intact sensation, distally, and she  is neurovascularly intact. IMAGING:  Three views left wrist taken today in the office showed anatomic alignment of left distal radius, plate and screws in good position, no loosening. IMPRESSION:  2 weeks out from left distal radius ORIF and doing very well. PLAN:  I have told the patient to work on ROM, as well as strengthening exercises with PT in the rehab center. A removable forearm brace applied. No heavy impact activities. The patient will come back for a follow up in 6 weeks. At that time, we will take 3 views of the left wrist.     As this patient has demonstrated risk factors for osteoporosis, such as age greater than [de-identified] years and evidence of a fracture, I have referred the patient back to the primary care physician for evaluation for osteoporosis, including consideration for DEXA scanning, if this is felt to be clinically indicated. The patient is advised to contact the primary care physician to follow-up for further evaluation. Procedures    Sherley Purdy Titan Wrist Long Forearm Brace     Patient was prescribed a Sherley Purdy Titan Wrist and Forearm Brace.    The left wrist will require stabilization / immobilization from this semi-rigid / rigid orthosis to improve their function. The orthosis will assist in protecting the affected area, provide functional support and facilitate healing. The patient was educated and fit by a healthcare professional with expert knowledge and specialization in brace application while under the direct supervision of the treating physician. Verbal and written instructions for the use of and application of this item were provided. They were instructed to contact the office immediately should the brace result in increased pain, decreased sensation, increased swelling or worsening of the condition.          Jordyn Edmonds, APRN - CNP

## 2022-03-21 ENCOUNTER — TELEPHONE (OUTPATIENT)
Dept: ORTHOPEDIC SURGERY | Age: 87
End: 2022-03-21

## 2022-03-21 NOTE — TELEPHONE ENCOUNTER
General Question     Subject: LT WRIST  Patient and /or Facility Request: [facility] Victoria  Contact Number: [phone]    DAUGHTER CALLING REGARDING THE CARE OF HER MOTHER'S LT WRIST. MOTHER HAS BEEN MOVED TO A DIFFERENT NURSING HOME. PATIENT IS CURRENTLY AT [facility]      PLEASE FAX NOTES ON HOW TO CARE FOR THE PATIENT LT WRIST, SINCE THE STITCHES HAS BEEN REMOVED.     FAX NUMBER: [phone]  ATTN: AMY OR PAULO STONE    PLEASE CALL THE FACILITY @ THE ABOVE NUMBER

## 2022-03-31 ENCOUNTER — TELEPHONE (OUTPATIENT)
Dept: ORTHOPEDIC SURGERY | Age: 87
End: 2022-03-31

## 2022-03-31 ENCOUNTER — OFFICE VISIT (OUTPATIENT)
Dept: ORTHOPEDIC SURGERY | Age: 87
End: 2022-03-31

## 2022-03-31 VITALS — BODY MASS INDEX: 18.36 KG/M2 | HEIGHT: 60 IN

## 2022-03-31 DIAGNOSIS — S52.572A OTHER CLOSED INTRA-ARTICULAR FRACTURE OF DISTAL END OF LEFT RADIUS, INITIAL ENCOUNTER: Primary | ICD-10-CM

## 2022-03-31 PROCEDURE — APPNB15 APP NON BILLABLE TIME 0-15 MINS: Performed by: NURSE PRACTITIONER

## 2022-03-31 PROCEDURE — 99024 POSTOP FOLLOW-UP VISIT: CPT | Performed by: NURSE PRACTITIONER

## 2022-03-31 NOTE — TELEPHONE ENCOUNTER
Patient will need office visit note from 3/31/2022 faxed to [facility]. Fax number: [phone].

## 2022-03-31 NOTE — PROGRESS NOTES
DIAGNOSIS:  Left distal radius 3 parts intra articular comminuted fracture, status post ORIF. DATE OF SURGERY: 2/2/2022. HISTORY OF PRESENT ILLNESS:  Ms. Conni Bernheim 80 y.o.  female right handed returns today  for 8 weeks postoperative visit. The patient denies any significant pain in the left wrist.  She is unable to rate her pain. She is currently in a rehab center working with physical therapy. No numbness or tingling sensation. No fever or Chills. She  is in a brace  She has seen Dr. Jam Zhang for her left shoulder fracture who recommended no surgery. PHYSICAL EXAMINATION:  The incision is completely healed . No signs of any erythema or drainage. She  has no pain with the active or passive range of motion of the left wrist, but decrease ROM. She is unable to make a full fist and all her fingers bilaterally are contracted. She  has intact sensation, distally, and she  is neurovascularly intact. IMAGING:  Three views left wrist taken today in the office showed anatomic alignment of left distal radius, plate and screws in good position, no loosening. There is some displacement seen on the AP view, stable. IMPRESSION:  8 weeks out from left distal radius ORIF and doing very well. PLAN:  I have told the patient to work on ROM, as well as strengthening exercises with PT in the rehab center. She can discontinue the brace. No heavy impact. The patient will come back for a follow up in 6 weeks. At that time, we will take 3 views of the left wrist.     As this patient has demonstrated risk factors for osteoporosis, such as age greater than [de-identified] years and evidence of a fracture, I have referred the patient back to the primary care physician for evaluation for osteoporosis, including consideration for DEXA scanning, if this is felt to be clinically indicated. The patient is advised to contact the primary care physician to follow-up for further evaluation.            Mario Alberto Palacios, APRN - [place]

## 2022-04-07 NOTE — CARE COORDINATION
Discharge Plan:     Patient discharged to:    [address]    SW/DC Planner faxed, [address] and AVS to: N/A    Narcotic Prescriptions faxed were: N/A    RN: will call report to:  [phone]    Medical Transport with: [facility] [phone]     time: [id number]    Family advised of discharge?: Yes    HENS Submitted?:  Yes    All discharge needs met per case management.     KATLIN JulesN RN  Case Manger    Phone: [phone] Minocycline Pregnancy And Lactation Text: This medication is Pregnancy Category D and not consider safe during pregnancy. It is also excreted in breast milk. Spironolactone Counseling: Patient advised regarding risks of diarrhea, abdominal pain, hyperkalemia, birth defects (for female patients), liver toxicity and renal toxicity. The patient may need blood work to monitor liver and kidney function and potassium levels while on therapy. The patient verbalized understanding of the proper use and possible adverse effects of spironolactone.  All of the patient's questions and concerns were addressed. Tazorac Pregnancy And Lactation Text: This medication is not safe during pregnancy. It is unknown if this medication is excreted in breast milk. Azithromycin Pregnancy And Lactation Text: This medication is considered safe during pregnancy and is also secreted in breast milk. Include Pregnancy/Lactation Warning?: No Azithromycin Counseling:  I discussed with the patient the risks of azithromycin including but not limited to GI upset, allergic reaction, drug rash, diarrhea, and yeast infections. Bactrim Pregnancy And Lactation Text: This medication is Pregnancy Category D and is known to cause fetal risk.  It is also excreted in breast milk. Winlevi Counseling:  I discussed with the patient the risks of topical clascoterone including but not limited to erythema, scaling, itching, and stinging. Patient voiced their understanding. Topical Sulfur Applications Pregnancy And Lactation Text: This medication is Pregnancy Category C and has an unknown safety profile during pregnancy. It is unknown if this topical medication is excreted in breast milk. Azelaic Acid Pregnancy And Lactation Text: This medication is considered safe during pregnancy and breast feeding. Tazorac Counseling:  Patient advised that medication is irritating and drying.  Patient may need to apply sparingly and wash off after an hour before eventually leaving it on overnight.  The patient verbalized understanding of the proper use and possible adverse effects of tazorac.  All of the patient's questions and concerns were addressed. Doxycycline Counseling:  Patient counseled regarding possible photosensitivity and increased risk for sunburn.  Patient instructed to avoid sunlight, if possible.  When exposed to sunlight, patients should wear protective clothing, sunglasses, and sunscreen.  The patient was instructed to call the office immediately if the following severe adverse effects occur:  hearing changes, easy bruising/bleeding, severe headache, or vision changes.  The patient verbalized understanding of the proper use and possible adverse effects of doxycycline.  All of the patient's questions and concerns were addressed. Benzoyl Peroxide Counseling: Patient counseled that medicine may cause skin irritation and bleach clothing.  In the event of skin irritation, the patient was advised to reduce the amount of the drug applied or use it less frequently.   The patient verbalized understanding of the proper use and possible adverse effects of benzoyl peroxide.  All of the patient's questions and concerns were addressed. Dapsone Counseling: I discussed with the patient the risks of dapsone including but not limited to hemolytic anemia, agranulocytosis, rashes, methemoglobinemia, kidney failure, peripheral neuropathy, headaches, GI upset, and liver toxicity.  Patients who start dapsone require monitoring including baseline LFTs and weekly CBCs for the first month, then every month thereafter.  The patient verbalized understanding of the proper use and possible adverse effects of dapsone.  All of the patient's questions and concerns were addressed. Spironolactone Pregnancy And Lactation Text: This medication can cause feminization of the male fetus and should be avoided during pregnancy. The active metabolite is also found in breast milk. Detail Level: Detailed Isotretinoin Pregnancy And Lactation Text: This medication is Pregnancy Category X and is considered extremely dangerous during pregnancy. It is unknown if it is excreted in breast milk. Topical Clindamycin Counseling: Patient counseled that this medication may cause skin irritation or allergic reactions.  In the event of skin irritation, the patient was advised to reduce the amount of the drug applied or use it less frequently.   The patient verbalized understanding of the proper use and possible adverse effects of clindamycin.  All of the patient's questions and concerns were addressed. Erythromycin Counseling:  I discussed with the patient the risks of erythromycin including but not limited to GI upset, allergic reaction, drug rash, diarrhea, increase in liver enzymes, and yeast infections. Topical Sulfur Applications Counseling: Topical Sulfur Counseling: Patient counseled that this medication may cause skin irritation or allergic reactions.  In the event of skin irritation, the patient was advised to reduce the amount of the drug applied or use it less frequently.   The patient verbalized understanding of the proper use and possible adverse effects of topical sulfur application.  All of the patient's questions and concerns were addressed. Dapsone Pregnancy And Lactation Text: This medication is Pregnancy Category C and is not considered safe during pregnancy or breast feeding. Winlevi Pregnancy And Lactation Text: This medication is considered safe during pregnancy and breastfeeding. Doxycycline Pregnancy And Lactation Text: This medication is Pregnancy Category D and not consider safe during pregnancy. It is also excreted in breast milk but is considered safe for shorter treatment courses. Benzoyl Peroxide Pregnancy And Lactation Text: This medication is Pregnancy Category C. It is unknown if benzoyl peroxide is excreted in breast milk. High Dose Vitamin A Counseling: Side effects reviewed, pt to contact office should one occur. Minocycline Counseling: Patient advised regarding possible photosensitivity and discoloration of the teeth, skin, lips, tongue and gums.  Patient instructed to avoid sunlight, if possible.  When exposed to sunlight, patients should wear protective clothing, sunglasses, and sunscreen.  The patient was instructed to call the office immediately if the following severe adverse effects occur:  hearing changes, easy bruising/bleeding, severe headache, or vision changes.  The patient verbalized understanding of the proper use and possible adverse effects of minocycline.  All of the patient's questions and concerns were addressed. Tetracycline Counseling: Patient counseled regarding possible photosensitivity and increased risk for sunburn.  Patient instructed to avoid sunlight, if possible.  When exposed to sunlight, patients should wear protective clothing, sunglasses, and sunscreen.  The patient was instructed to call the office immediately if the following severe adverse effects occur:  hearing changes, easy bruising/bleeding, severe headache, or vision changes.  The patient verbalized understanding of the proper use and possible adverse effects of tetracycline.  All of the patient's questions and concerns were addressed. Patient understands to avoid pregnancy while on therapy due to potential birth defects. Topical Clindamycin Pregnancy And Lactation Text: This medication is Pregnancy Category B and is considered safe during pregnancy. It is unknown if it is excreted in breast milk. Erythromycin Pregnancy And Lactation Text: This medication is Pregnancy Category B and is considered safe during pregnancy. It is also excreted in breast milk. Sarecycline Counseling: Patient advised regarding possible photosensitivity and discoloration of the teeth, skin, lips, tongue and gums.  Patient instructed to avoid sunlight, if possible.  When exposed to sunlight, patients should wear protective clothing, sunglasses, and sunscreen.  The patient was instructed to call the office immediately if the following severe adverse effects occur:  hearing changes, easy bruising/bleeding, severe headache, or vision changes.  The patient verbalized understanding of the proper use and possible adverse effects of sarecycline.  All of the patient's questions and concerns were addressed. Birth Control Pills Counseling: Birth Control Pill Counseling: I discussed with the patient the potential side effects of OCPs including but not limited to increased risk of stroke, heart attack, thrombophlebitis, deep venous thrombosis, hepatic adenomas, breast changes, GI upset, headaches, and depression.  The patient verbalized understanding of the proper use and possible adverse effects of OCPs. All of the patient's questions and concerns were addressed. Birth Control Pills Pregnancy And Lactation Text: This medication should be avoided if pregnant and for the first 30 days post-partum. Isotretinoin Counseling: Patient should get monthly blood tests, not donate blood, not drive at night if vision affected, not share medication, and not undergo elective surgery for 6 months after tx completed. Side effects reviewed, pt to contact office should one occur. High Dose Vitamin A Pregnancy And Lactation Text: High dose vitamin A therapy is contraindicated during pregnancy and breast feeding. Topical Retinoid counseling:  Patient advised to apply a pea-sized amount only at bedtime and wait 30 minutes after washing their face before applying.  If too drying, patient may add a non-comedogenic moisturizer. The patient verbalized understanding of the proper use and possible adverse effects of retinoids.  All of the patient's questions and concerns were addressed. Topical Retinoid Pregnancy And Lactation Text: This medication is Pregnancy Category C. It is unknown if this medication is excreted in breast milk. Azelaic Acid Counseling: Patient counseled that medicine may cause skin irritation and to avoid applying near the eyes.  In the event of skin irritation, the patient was advised to reduce the amount of the drug applied or use it less frequently.   The patient verbalized understanding of the proper use and possible adverse effects of azelaic acid.  All of the patient's questions and concerns were addressed. Bactrim Counseling:  I discussed with the patient the risks of sulfa antibiotics including but not limited to GI upset, allergic reaction, drug rash, diarrhea, dizziness, photosensitivity, and yeast infections.  Rarely, more serious reactions can occur including but not limited to aplastic anemia, agranulocytosis, methemoglobinemia, blood dyscrasias, liver or kidney failure, lung infiltrates or desquamative/blistering drug rashes.

## 2022-05-12 ENCOUNTER — OFFICE VISIT (OUTPATIENT)
Dept: ORTHOPEDIC SURGERY | Age: 87
End: 2022-05-12
Payer: MEDICARE

## 2022-05-12 VITALS — BODY MASS INDEX: 18.46 KG/M2 | HEIGHT: 60 IN | WEIGHT: 94 LBS

## 2022-05-12 DIAGNOSIS — S52.572A OTHER CLOSED INTRA-ARTICULAR FRACTURE OF DISTAL END OF LEFT RADIUS, INITIAL ENCOUNTER: Primary | ICD-10-CM

## 2022-05-12 PROCEDURE — 4040F PNEUMOC VAC/ADMIN/RCVD: CPT | Performed by: ORTHOPAEDIC SURGERY

## 2022-05-12 PROCEDURE — 99213 OFFICE O/P EST LOW 20 MIN: CPT | Performed by: ORTHOPAEDIC SURGERY

## 2022-05-12 PROCEDURE — 1036F TOBACCO NON-USER: CPT | Performed by: ORTHOPAEDIC SURGERY

## 2022-05-12 PROCEDURE — 1123F ACP DISCUSS/DSCN MKR DOCD: CPT | Performed by: ORTHOPAEDIC SURGERY

## 2022-05-12 PROCEDURE — G8419 CALC BMI OUT NRM PARAM NOF/U: HCPCS | Performed by: ORTHOPAEDIC SURGERY

## 2022-05-12 PROCEDURE — 1090F PRES/ABSN URINE INCON ASSESS: CPT | Performed by: ORTHOPAEDIC SURGERY

## 2022-05-12 PROCEDURE — G8427 DOCREV CUR MEDS BY ELIG CLIN: HCPCS | Performed by: ORTHOPAEDIC SURGERY

## 2022-05-12 NOTE — PROGRESS NOTES
DIAGNOSIS:  Left distal radius 3 parts intra articular comminuted fracture, status post ORIF. DATE OF SURGERY: 2/2/2022. HISTORY OF PRESENT ILLNESS:  Abram Martel 80 y.o.  female right handed returns today  for 3 months postoperative visit. The patient denies any significant pain in the left wrist.  She is unable to rate her pain. She is currently in a rehab center working with physical therapy. No numbness or tingling sensation. No fever or Chills. She has seen Dr. Daphne Gonzáles for her left shoulder fracture who recommended no surgery. Past Medical History:   Diagnosis Date    Anemia     CAD (coronary artery disease)     Glaucoma     High blood pressure     Myocardial infarction [facility])        Past Surgical History:   Procedure Laterality Date    APPENDECTOMY      FOREARM SURGERY Left 2/2/2022    OPEN REDUCTION INTERNAL FIXATION LEFT DISTAL RADIUS performed by Shyam Gilliam MD at [address] Right 05/25/2017    RIGHT BIPOLAR HIP HEMIARTHROPLASTY        PACEMAKER INSERTION         Social History     Socioeconomic History    Marital status:       Spouse name: Not on file    Number of children: Not on file    Years of education: Not on file    Highest education level: Not on file   Occupational History    Occupation: Retired   Tobacco Use    Smoking status: Never Smoker    Smokeless tobacco: Never Used   Vaping Use    Vaping Use: Never used   Substance and Sexual Activity    Alcohol use: Not Currently     Alcohol/week: 0.0 standard drinks     Comment: occasionally    Drug use: No    Sexual activity: Not on file   Other Topics Concern    Not on file   Social History Narrative    Not on file     Social Determinants of Health     Financial Resource Strain:     Difficulty of Paying Living Expenses: Not on file   Food Insecurity:     Worried About [address] in the Last Year: Not on file    [address] in the Last Year: Not on file Transportation Needs:     Lack of Transportation (Medical): Not on file    Lack of Transportation (Non-Medical): Not on file   Physical Activity:     Days of Exercise per Week: Not on file    Minutes of Exercise per Session: Not on file   Stress:     Feeling of Stress : Not on file   Social Connections:     Frequency of Communication with Friends and Family: Not on file    Frequency of Social Gatherings with Friends and Family: Not on file    Attends Jainism Services: Not on file    Active Member of [address] [place] [address] [employer] or Organizations: Not on file    Attends Club or Organization Meetings: Not on file    Marital Status: Not on file   Intimate Partner Violence:     Fear of Current or Ex-Partner: Not on file    Emotionally Abused: Not on file    Physically Abused: Not on file    Sexually Abused: Not on file   Housing Stability:     Unable to Pay for Housing in the Last Year: Not on file    Number of [place] in the Last Year: Not on file    Unstable Housing in the Last Year: Not on file       History reviewed. No pertinent family history. Current Outpatient Medications on File Prior to Visit   Medication Sig Dispense Refill    Cholecalciferol (VITAMIN D3) 50 MCG (2000 UT) CAPS Take 2,000 Units by mouth daily      oxyCODONE (ROXICODONE) 5 MG immediate release tablet Take 5 mg by mouth 2 times daily.  Petrolatum-Zinc Oxide (PHYTOPLEX Z-GUARD) 57-17 % PSTE Apply topically 2 times daily Apply to bilateral buttocks      acetaminophen (TYLENOL) 500 MG tablet Take 1,000 mg by mouth 3 times daily      zinc gluconate 50 MG tablet Take 50 mg by mouth daily      menthol-zinc oxide (CALMOSEPTINE) 0.44-20.625 % OINT ointment Apply topically 4 times daily as needed Max 30 ml per day.  oxyCODONE (ROXICODONE) 5 MG immediate release tablet Take 5 mg by mouth every 4 hours as needed.        levothyroxine (SYNTHROID) 50 MCG tablet Take 50 mcg by mouth Daily      lisinopril (PRINIVIL;ZESTRIL) 10 MG tablet Take 10 mg by mouth daily       warfarin (COUMADIN) 2.5 MG tablet Take 2.5 mg by mouth daily Indications: Atrial Fibrillation       latanoprost (XALATAN) 0.005 % ophthalmic solution Place 1 drop into both eyes nightly       nitroGLYCERIN (NITROSTAT) 0.4 MG SL tablet Place 0.4 mg under the tongue every 5 minutes as needed for Chest pain Dissolve 1 tab under tongue at first sign of chest pain. May repeat every 5 minutes until relief is obtained. If pain persists after taking 3 tabs in a 15-minute period, or the pain is different than is typically experienced, call 9-1-1 immediately.  metoprolol succinate (TOPROL XL) 25 MG extended release tablet Take 25 mg by mouth daily       No current facility-administered medications on file prior to visit. Pertinent items are noted in HPI  Review of systems reviewed from Patient History Form and available in the patient's chart under the Media tab. No change noted. PHYSICAL EXAMINATION:  Ms. Lulu Bowling is a very pleasant 80 y.o.  female who presents today in no acute distress, awake, alert, but disoriented. She is well dressed, nourished and  groomed. Patient with normal affect. Height is  5' (1.524 m), weight is 94 lb (42.6 kg), Body mass index is 18.36 kg/m². Resting respiratory rate is 16. The incision is completely healed. No signs of any erythema or drainage. She has no pain with the active or passive range of motion of the left wrist, but decrease ROM. She is unable to make a full fist and all her fingers bilaterally are contracted. She  has intact sensation, distally, and she  is neurovascularly intact. IMAGING:  Three views left wrist taken today in the office showed anatomic alignment of left distal radius, plate and screws in good position, no loosening. There is some displacement seen on the AP view, stable. IMPRESSION:  3 months out from left distal radius ORIF and doing very well.     PLAN:  I have told the patient to work on ROM, as well as strengthening exercises with PT in the rehab center. She can go back to normal activity with no restrictions. She will be seen PRN. I told the patient that it is not unusual to have some achy pain and swelling for up to a year after a fracture. As this patient has demonstrated risk factors for osteoporosis, such as age greater than [de-identified] years and evidence of a fracture, I have referred the patient back to the primary care physician for evaluation for osteoporosis, including consideration for DEXA scanning, if this is felt to be clinically indicated. The patient is advised to contact the primary care physician to follow-up for further evaluation.        Robert Gaviria MD

## 2025-02-08 NOTE — PROGRESS NOTES
Patient asking about food. Alert to self only. Okay to place on Cardiac diet per Dr. Lauro Peralta. 76-year-old female PMH of HTN, DM, HLD, on aspirin, scleroderma, presents for lightheadedness and syncope with head trauma. Patient states she was visiting friends in a nursing home, was walking to the bus stop to leave, felt lightheaded and passed out. She was able to ambulate with assistance after the fall. Has trauma to the left side of head, on aspirin, admits to right knee pain, abrasions to left hand.   No chest pain back pain nausea vomiting or abdominal pain no pain with any extraocular movement    Vitals in the ED: /79, HR 79, T 98.9, RR 18, SpO2 97% on RA  EKG: NSR HR 77  Labs: WBC 3, Hgb 10.7, trop 17-12, electrolytes within normal  Imaging:   - Xray Chest (02.07.25 @ 15:26): No radiographic evidence of acute cardiopulmonary disease.  - Xray Knee, Right (02.07.25 @ 15:26): No acute displaced fracture.  - Xray Hand, Left (02.07.25 @ 15:26): No acute displaced fracture.  - CT Head No Cont (02.07.25 @ 14:52):  1. No evidence of acute intracranial pathology. 2.  Partially visualized comminuted, mildly displaced fractures of the left orbital floor and posterolateral wall of the left maxillary sinus. 3.  Left lateral periorbital soft tissue swelling/subcutaneous hematoma.  - CT Cervical Spine No Cont (02.07.25 @ 22:46): No evidence of acute fracture of the cervical spine.  - CT Maxillofacial No Cont (02.07.25 @ 21:00): Left zygomatic arch fracture. Acute fractures of lateral orbital wall and lateral maxillary sinus. Additional left posterior inferior orbital wall fracture.  - CT Chest, Abdomen and Pelvis w/ IV Cont (02.07.25 @ 22:47): No evidence of acute traumatic thoracic or abdominal pathology.    In the ED, patient received Unasyn, LR 1L bolus, Reglan and Morphine 4mg IV once.  (08 Feb 2025 00:33)   76-year-old female PMH of HTN, DM, HLD, on aspirin, scleroderma, presents for lightheadedness and syncope with head trauma. Patient states she was visiting friends in a nursing home, was walking to the bus stop to leave, felt lightheaded and passed out. She was able to ambulate with assistance after the fall. Has trauma to the left side of head, on aspirin, admits to right knee pain, abrasions to left hand.   No chest pain back pain nausea vomiting or abdominal pain no pain with any extraocular movement    Vitals in the ED: /79, HR 79, T 98.9, RR 18, SpO2 97% on RA  EKG: NSR HR 77  Labs: WBC 3, Hgb 10.7, trop 17-12, electrolytes within normal  Imaging:   - Xray Chest (02.07.25 @ 15:26): No radiographic evidence of acute cardiopulmonary disease.  - Xray Knee, Right (02.07.25 @ 15:26): No acute displaced fracture.  - Xray Hand, Left (02.07.25 @ 15:26): No acute displaced fracture.  - CT Head No Cont (02.07.25 @ 14:52):  1. No evidence of acute intracranial pathology. 2.  Partially visualized comminuted, mildly displaced fractures of the left orbital floor and posterolateral wall of the left maxillary sinus. 3.  Left lateral periorbital soft tissue swelling/subcutaneous hematoma.  - CT Cervical Spine No Cont (02.07.25 @ 22:46): No evidence of acute fracture of the cervical spine.  - CT Maxillofacial No Cont (02.07.25 @ 21:00): Left zygomatic arch fracture. Acute fractures of lateral orbital wall and lateral maxillary sinus. Additional left posterior inferior orbital wall fracture.  - CT Chest, Abdomen and Pelvis w/ IV Cont (02.07.25 @ 22:47): No evidence of acute traumatic thoracic or abdominal pathology.    pt is feeling well, ambulating independantly , has no pain, no n/v, no fever   wants to go home   daughter is at the bedside.   dw maxillofacial surgery team, no need for antibiotics, pt may be discharged from their standpoint and fu in 2 wks with them    76-year-old female PMH of HTN, DM, HLD, on aspirin, scleroderma, presents for lightheadedness and syncope with head trauma. Patient states she was visiting friends in a nursing home, was walking to the bus stop to leave, felt lightheaded and passed out. She was able to ambulate with assistance after the fall. Has trauma to the left side of head, on aspirin, admits to right knee pain, abrasions to left hand.   No chest pain back pain nausea vomiting or abdominal pain no pain with any extraocular movement    Vitals in the ED: /79, HR 79, T 98.9, RR 18, SpO2 97% on RA  EKG: NSR HR 77  Labs: WBC 3, Hgb 10.7, trop 17-12, electrolytes within normal  Imaging:   - Xray Chest (02.07.25 @ 15:26): No radiographic evidence of acute cardiopulmonary disease.  - Xray Knee, Right (02.07.25 @ 15:26): No acute displaced fracture.  - Xray Hand, Left (02.07.25 @ 15:26): No acute displaced fracture.  - CT Head No Cont (02.07.25 @ 14:52):  1. No evidence of acute intracranial pathology. 2.  Partially visualized comminuted, mildly displaced fractures of the left orbital floor and posterolateral wall of the left maxillary sinus. 3.  Left lateral periorbital soft tissue swelling/subcutaneous hematoma.  - CT Cervical Spine No Cont (02.07.25 @ 22:46): No evidence of acute fracture of the cervical spine.  - CT Maxillofacial No Cont (02.07.25 @ 21:00): Left zygomatic arch fracture. Acute fractures of lateral orbital wall and lateral maxillary sinus. Additional left posterior inferior orbital wall fracture.  - CT Chest, Abdomen and Pelvis w/ IV Cont (02.07.25 @ 22:47): No evidence of acute traumatic thoracic or abdominal pathology.    pt is feeling well, ambulating independantly , has no pain, no n/v, no fever   telemetry events reviewed, no events    wants to go home   daughter is at the bedside.   dw maxillofacial surgery team, no need for antibiotics, pt may be discharged from their standpoint and fu in 2 wks with them

## (undated) DEVICE — GLOVE SURG SZ 6 L12IN FNGR THK79MIL GRN LTX FREE

## (undated) DEVICE — GLOVE SURG SZ 65 THK91MIL LTX FREE SYN POLYISOPRENE

## (undated) DEVICE — PENCIL ES L3M BTTN SWCH S STL HEX LOK BLDE ELECTRD HOLSTER

## (undated) DEVICE — GOWN SIRUS NONREIN LG W/TWL: Brand: MEDLINE INDUSTRIES, INC.

## (undated) DEVICE — 3M™ STERI-STRIP™ COMPOUND BENZOIN TINCTURE 40 BAGS/CARTON 4 CARTONS/CASE C1544: Brand: 3M™ STERI-STRIP™

## (undated) DEVICE — DRESSING,GAUZE,XEROFORM,CURAD,1"X8",ST: Brand: CURAD

## (undated) DEVICE — GLOVE SURG SZ 65 L12IN FNGR THK79MIL GRN LTX FREE

## (undated) DEVICE — SUTURE VCRL + SZ 3-0 L18IN ABSRB UD SH 1/2 CIR TAPERCUT NDL VCP864D

## (undated) DEVICE — GLOVE SURG SZ 8 CRM LTX FREE POLYISOPRENE POLYMER BEAD ANTI

## (undated) DEVICE — HAND AND ELBOW: Brand: MEDLINE INDUSTRIES, INC.

## (undated) DEVICE — SOLUTION IV IRRIG POUR BRL 0.9% SODIUM CHL 2F7124

## (undated) DEVICE — T-DRAPE,EXTREMITY,STERILE: Brand: MEDLINE

## (undated) DEVICE — SPLINT ORTH W3XL12IN LAYERED FBRGLS FOAM PD BRTH BK MOLD

## (undated) DEVICE — BANDAGE COMPR W4INXL15FT BGE E SGL LAYERED CLP CLSR

## (undated) DEVICE — C-ARM PACK: Brand: C-ARM COVER

## (undated) DEVICE — ELECTRODE PT RET AD L9FT HI MOIST COND ADH HYDRGEL CORDED

## (undated) DEVICE — STRIP,CLOSURE,WOUND,MEDI-STRIP,1/2X4: Brand: MEDLINE

## (undated) DEVICE — SYRINGE IRRIG 60ML SFT PLIABLE BLB EZ TO GRP 1 HND USE W/

## (undated) DEVICE — KIRSCHNER WIRE
Type: IMPLANTABLE DEVICE | Site: RADIUS | Status: NON-FUNCTIONAL
Brand: VARIAX
Removed: 2022-02-02

## (undated) DEVICE — CANISTER, RIGID, 1200CC: Brand: MEDLINE INDUSTRIES, INC.

## (undated) DEVICE — DRILL, AO, SCALED: Brand: VARIAX